# Patient Record
Sex: MALE | Race: WHITE | NOT HISPANIC OR LATINO | Employment: FULL TIME | URBAN - NONMETROPOLITAN AREA
[De-identification: names, ages, dates, MRNs, and addresses within clinical notes are randomized per-mention and may not be internally consistent; named-entity substitution may affect disease eponyms.]

---

## 2017-10-30 ENCOUNTER — TRANSCRIBE ORDERS (OUTPATIENT)
Dept: LAB | Facility: CLINIC | Age: 47
End: 2017-10-30

## 2017-10-30 ENCOUNTER — APPOINTMENT (OUTPATIENT)
Dept: LAB | Facility: CLINIC | Age: 47
End: 2017-10-30
Payer: COMMERCIAL

## 2017-10-30 DIAGNOSIS — E55.9 VITAMIN D DEFICIENCY: ICD-10-CM

## 2017-10-30 DIAGNOSIS — E03.9 HYPOTHYROIDISM, UNSPECIFIED TYPE: Primary | ICD-10-CM

## 2017-10-30 DIAGNOSIS — E03.9 HYPOTHYROIDISM, UNSPECIFIED TYPE: ICD-10-CM

## 2017-10-30 LAB
ALBUMIN SERPL BCP-MCNC: 4 G/DL (ref 3.5–5)
ALP SERPL-CCNC: 54 U/L (ref 46–116)
ALT SERPL W P-5'-P-CCNC: 74 U/L (ref 12–78)
ANION GAP SERPL CALCULATED.3IONS-SCNC: 7 MMOL/L (ref 4–13)
AST SERPL W P-5'-P-CCNC: 63 U/L (ref 5–45)
BASOPHILS # BLD AUTO: 0.07 THOUSANDS/ΜL (ref 0–0.1)
BASOPHILS NFR BLD AUTO: 1 % (ref 0–1)
BILIRUB SERPL-MCNC: 0.44 MG/DL (ref 0.2–1)
BILIRUB UR QL STRIP: NEGATIVE
BUN SERPL-MCNC: 13 MG/DL (ref 5–25)
CALCIUM SERPL-MCNC: 8.6 MG/DL (ref 8.3–10.1)
CHLORIDE SERPL-SCNC: 101 MMOL/L (ref 100–108)
CHOLEST SERPL-MCNC: 220 MG/DL (ref 50–200)
CLARITY UR: CLEAR
CO2 SERPL-SCNC: 30 MMOL/L (ref 21–32)
COLOR UR: YELLOW
CREAT SERPL-MCNC: 0.86 MG/DL (ref 0.6–1.3)
EOSINOPHIL # BLD AUTO: 0.21 THOUSAND/ΜL (ref 0–0.61)
EOSINOPHIL NFR BLD AUTO: 4 % (ref 0–6)
ERYTHROCYTE [DISTWIDTH] IN BLOOD BY AUTOMATED COUNT: 12.9 % (ref 11.6–15.1)
GFR SERPL CREATININE-BSD FRML MDRD: 105 ML/MIN/1.73SQ M
GLUCOSE P FAST SERPL-MCNC: 158 MG/DL (ref 65–99)
GLUCOSE UR STRIP-MCNC: NEGATIVE MG/DL
HCT VFR BLD AUTO: 42.4 % (ref 36.5–49.3)
HDLC SERPL-MCNC: 89 MG/DL (ref 40–60)
HGB BLD-MCNC: 14.4 G/DL (ref 12–17)
HGB UR QL STRIP.AUTO: NEGATIVE
KETONES UR STRIP-MCNC: NEGATIVE MG/DL
LDLC SERPL CALC-MCNC: 116 MG/DL (ref 0–100)
LEUKOCYTE ESTERASE UR QL STRIP: NEGATIVE
LYMPHOCYTES # BLD AUTO: 2 THOUSANDS/ΜL (ref 0.6–4.47)
LYMPHOCYTES NFR BLD AUTO: 33 % (ref 14–44)
MCH RBC QN AUTO: 30.9 PG (ref 26.8–34.3)
MCHC RBC AUTO-ENTMCNC: 34 G/DL (ref 31.4–37.4)
MCV RBC AUTO: 91 FL (ref 82–98)
MONOCYTES # BLD AUTO: 0.58 THOUSAND/ΜL (ref 0.17–1.22)
MONOCYTES NFR BLD AUTO: 10 % (ref 4–12)
NEUTROPHILS # BLD AUTO: 3.15 THOUSANDS/ΜL (ref 1.85–7.62)
NEUTS SEG NFR BLD AUTO: 52 % (ref 43–75)
NITRITE UR QL STRIP: NEGATIVE
NRBC BLD AUTO-RTO: 0 /100 WBCS
PH UR STRIP.AUTO: 6 [PH] (ref 4.5–8)
PLATELET # BLD AUTO: 342 THOUSANDS/UL (ref 149–390)
PMV BLD AUTO: 10 FL (ref 8.9–12.7)
POTASSIUM SERPL-SCNC: 3.9 MMOL/L (ref 3.5–5.3)
PROT SERPL-MCNC: 7 G/DL (ref 6.4–8.2)
PROT UR STRIP-MCNC: NEGATIVE MG/DL
RBC # BLD AUTO: 4.66 MILLION/UL (ref 3.88–5.62)
SODIUM SERPL-SCNC: 138 MMOL/L (ref 136–145)
SP GR UR STRIP.AUTO: 1.02 (ref 1–1.03)
T4 FREE SERPL-MCNC: 0.96 NG/DL (ref 0.76–1.46)
TRIGL SERPL-MCNC: 76 MG/DL
TSH SERPL DL<=0.05 MIU/L-ACNC: 2.91 UIU/ML (ref 0.36–3.74)
UROBILINOGEN UR QL STRIP.AUTO: 0.2 E.U./DL
WBC # BLD AUTO: 6.02 THOUSAND/UL (ref 4.31–10.16)

## 2017-10-30 PROCEDURE — 84443 ASSAY THYROID STIM HORMONE: CPT

## 2017-10-30 PROCEDURE — 85025 COMPLETE CBC W/AUTO DIFF WBC: CPT

## 2017-10-30 PROCEDURE — 36415 COLL VENOUS BLD VENIPUNCTURE: CPT

## 2017-10-30 PROCEDURE — 84439 ASSAY OF FREE THYROXINE: CPT

## 2017-10-30 PROCEDURE — 81003 URINALYSIS AUTO W/O SCOPE: CPT | Performed by: INTERNAL MEDICINE

## 2017-10-30 PROCEDURE — 80053 COMPREHEN METABOLIC PANEL: CPT

## 2017-10-30 PROCEDURE — 80061 LIPID PANEL: CPT

## 2018-05-11 ENCOUNTER — HOSPITAL ENCOUNTER (EMERGENCY)
Facility: HOSPITAL | Age: 48
Discharge: HOME | End: 2018-05-11
Attending: EMERGENCY MEDICINE | Admitting: EMERGENCY MEDICINE
Payer: COMMERCIAL

## 2018-05-11 VITALS
DIASTOLIC BLOOD PRESSURE: 60 MMHG | SYSTOLIC BLOOD PRESSURE: 135 MMHG | WEIGHT: 240 LBS | BODY MASS INDEX: 34.36 KG/M2 | RESPIRATION RATE: 18 BRPM | HEIGHT: 70 IN | TEMPERATURE: 97.6 F | OXYGEN SATURATION: 94 % | HEART RATE: 78 BPM

## 2018-05-11 DIAGNOSIS — T23.102A: Primary | ICD-10-CM

## 2018-05-11 PROCEDURE — 99283 EMERGENCY DEPT VISIT LOW MDM: CPT

## 2018-05-11 PROCEDURE — 90715 TDAP VACCINE 7 YRS/> IM: CPT | Performed by: EMERGENCY MEDICINE

## 2018-05-11 PROCEDURE — 90471 IMMUNIZATION ADMIN: CPT

## 2018-05-11 RX ORDER — NAPROXEN 500 MG/1
500 TABLET ORAL 2 TIMES DAILY WITH MEALS
Qty: 30 TABLET | Refills: 0 | Status: SHIPPED | OUTPATIENT
Start: 2018-05-11 | End: 2019-08-08 | Stop reason: ALTCHOICE

## 2018-05-11 RX ORDER — MONTELUKAST SODIUM 10 MG/1
10 TABLET ORAL
COMMUNITY
Start: 2015-10-27 | End: 2018-08-25

## 2018-05-11 RX ORDER — VALSARTAN 80 MG/1
80 TABLET ORAL DAILY
COMMUNITY
End: 2018-08-25

## 2018-05-11 RX ORDER — NAPROXEN 250 MG/1
500 TABLET ORAL ONCE
Status: COMPLETED | OUTPATIENT
Start: 2018-05-11 | End: 2018-05-11

## 2018-05-11 RX ORDER — CITALOPRAM 20 MG/1
20 TABLET ORAL DAILY
COMMUNITY
Start: 2010-12-13

## 2018-05-11 RX ORDER — LEVOTHYROXINE SODIUM 88 UG/1
88 CAPSULE ORAL
COMMUNITY
Start: 2015-12-02

## 2018-05-11 RX ADMIN — TETANUS TOXOID, REDUCED DIPHTHERIA TOXOID AND ACELLULAR PERTUSSIS VACCINE, ADSORBED 0.5 ML: 5; 2.5; 8; 8; 2.5 SUSPENSION INTRAMUSCULAR at 21:34

## 2018-05-11 RX ADMIN — NAPROXEN 500 MG: 250 TABLET ORAL at 21:33

## 2018-05-12 NOTE — DISCHARGE INSTRUCTIONS
Superficial Burn   WHAT YOU NEED TO KNOW:   A superficial burn, or first-degree burn, is when the outer layer of skin has been burned  DISCHARGE INSTRUCTIONS:   Call 911 for any of the following:   · You have trouble breathing  Return to the emergency department if:   · You have a burn to the face, neck, hands, or genitals  · Your burn covers a large area such as your trunk or entire arm or leg  · You have increased redness, numbness, or swelling in the superficial burn area  · You have red streaks or blisters spreading outward from the superficial burn  · Your pain is not relieved, or is getting worse even after you take medicine  Contact your healthcare provider if:   · You have a fever  · You have questions or concerns about your condition or care  Medicines:   · Ibuprofen or acetaminophen  are given to decrease your pain and swelling  They are available without a doctor's order  These medicines can cause liver or kidney problems if they are not used correctly  Ask how much medicine is safe to take, and how often to take it  · Take your medicine as directed  Contact your healthcare provider if you think your medicine is not helping or if you have side effects  Tell him of her if you are allergic to any medicine  Keep a list of the medicines, vitamins, and herbs you take  Include the amounts, and when and why you take them  Bring the list or the pill bottles to follow-up visits  Carry your medicine list with you in case of an emergency  Follow up with your healthcare provider as directed:  Write down your questions so you remember to ask them during your visits  First aid for a superficial burn:  A superficial burn usually heals in 3 to 5 days without scarring or blisters  Use the following first-aid guide to treat your burn:  · Remove clothing and jewelry immediately  · Flush liquid chemicals from your skin completely with large amounts of cool running water   Do not splash the chemicals into your eyes  · Brush dry chemicals off your skin if large amounts of water are not available  Small amounts of water will activate some chemicals, such as lime, and cause more damage  Do not splash the chemicals into your eyes  · Run cool or cold temperature water over the burned area for 10 minutes  A cold or cool compress can also be put on the burn  Do not use ice or ice water on the affected area  This may cause more damage to the skin  · Use an antibacterial ointment or skin cream, such as aloe vera cream, to soothe the skin  Do not put butter, petroleum jelly, or other home remedies on skin burned by a chemical     · Do not put a bandage on the burn until you are told to do so by your healthcare provider  Prevent superficial burns:   · Do not leave cups, mugs, or bowls containing hot liquids at the edge of a table  Keep pot handles turned away from the stove front  · Do not leave a lit cigarette  Discard it properly  Keep cigarette lighters and matches in a safe place where children cannot reach them  · Keep your water heater setting to low or medium (90°F to 120°F, or 32°C to 48°C)  · Wear sunscreen that has a sun protectant factor (SPF) of 15 or higher  The sunscreen should also have ultraviolet A (UVA) and ultraviolet B (UVB) protection  Follow the directions on the label when you use sunscreen  Put on more sunscreen if you are in the sun for more than an hour  Reapply sunscreen often if you go swimming or are sweating  © 2017 [facility] [facility] Information is for End User's use only and may not be sold, redistributed or otherwise used for commercial purposes  All illustrations and images included in CareNotes® are the copyrighted property of A D A M , Inc  or [employer] López  The above information is an  only  It is not intended as medical advice for individual conditions or treatments   Talk to your doctor, nurse or pharmacist before following any medical regimen to see if it is safe and effective for you  Diphtheria/Acellular Pertussis/Tetanus Booster Vaccine (Tdap) (By injection)   Pertussis Vaccine, Acellular (per-TUS-iss VAX-een, p-GKTY-imf-lar), Reduced Diphtheria Toxoid (ree-DOOST dif-THEER-ee-a TOX-oyd), Tetanus Toxoid (TET-a-nus TOX-oyd)  Protects against infections caused by tetanus (lockjaw), diphtheria, or pertussis (whooping cough)  This is a booster vaccine  Brand Name(s): Adacel, Boostrix   There may be other brand names for this medicine  When This Medicine Should Not Be Used: You should not receive this vaccine if you have had an allergic reaction to the separate or combined tetanus, diphtheria, or pertussis vaccine  You should not receive this vaccine if you have had seizures, mental changes, or any other serious reaction within 7 days after you received a pertussis vaccine  How to Use This Medicine:   Injectable  · A nurse or other health provider will give you this medicine  · Your doctor will prescribe your exact dose and tell you how often it should be given  This medicine is given as a shot into one of your muscles  · You may receive other vaccines at the same time as this one, but in a different body area  You should receive patient instructions for all of the vaccines  Talk to your doctor or nurse if you have questions  · Read and follow the patient instructions that come with this medicine  Talk to your doctor or pharmacist if you have any questions  Drugs and Foods to Avoid:   Ask your doctor or pharmacist before using any other medicine, including over-the-counter medicines, vitamins, and herbal products  · Make sure the doctor knows if you are receiving a treatment or medicine that weakens your immune system  This includes radiation treatment, steroid medicines (such as dexamethasone, hydrocortisone, methylprednisolone, prednisolone, prednisone, Medrol®), or cancer medicines    Warnings While Using This Medicine:   · Make sure your doctor knows if you are pregnant or breastfeeding or have epilepsy, a weak immune system, or a history of a stroke  Tell your doctor if you are sick or have a fever  · Tell your doctor about any reaction you had after you received a vaccine  This includes fainting, seizures, a fever over 105 degrees F, or severe redness or swelling where the shot was given  Tell your doctor if you have a history of Guillain-Barré syndrome after you received a vaccine with tetanus  · Call your doctor right away if you faint or have vision changes, numbness or tingling in your arms, hands, or feet, or a seizure after you receive this vaccine  · Tell your doctor if you have an allergy to latex  The syringes may contain dry natural latex rubber  · This vaccine will not treat an active infection  If you have a diphtheria, tetanus, or pertussis infection, you will need medicine to treat the infection  Possible Side Effects While Using This Medicine:   Call your doctor right away if you notice any of these side effects:  · Allergic reaction: Itching or hives, swelling in your face or hands, swelling or tingling in your mouth or throat, chest tightness, trouble breathing  · Changes in vision  · Fever over 105 degrees F  · Lightheadedness or fainting  · Numbness, tingling, or burning pain in your hands, arms, legs, or feet  · Seizures  · Sudden numbness or weakness in your arms or legs  · Severe pain, redness, or swelling where the shot was given  If you notice these less serious side effects, talk with your doctor:   · Headache  · Mild pain, redness, or swelling where the shot was given  · Nausea, vomiting, diarrhea, or stomach pain  · Tiredness  If you notice other side effects that you think are caused by this medicine, tell your doctor  Call your doctor for medical advice about side effects   You may report side effects to FDA at 0-856-JSW-7682  © 2017 [employer] [address] is for End User's use only and may not be sold, redistributed or otherwise used for commercial purposes  The above information is an  only  It is not intended as medical advice for individual conditions or treatments  Talk to your doctor, nurse or pharmacist before following any medical regimen to see if it is safe and effective for you

## 2018-05-12 NOTE — ED PROVIDER NOTES
History  Chief Complaint   Patient presents with    Burn     c/o burn to L little finger, per pt "I was cooking and burned it on cooking oil  I put water on it but it still is burning"  Skin intact +capillary refill +pulses  Denies fall/trauma/oil burn to any other part of body  This is a very pleasant 57-year-old right-handed male who presents with a hot oil burn injury to left hand occurring approximately 1930 this evening when he was at home  States that he was holding a glass container into which he was pouring used hot cooking oil when the container ruptured, causing a burn primarily to the distal ulnar aspect of his left palm and left fifth digit  States that there was no splashing of oil onto his face or any other parts of his body  He ran hand under cold water for several minutes and came to the emergency department  He has had continued pain in the aforementioned area since that time; denies paresthesias/weakness/paralysis of left upper extremity  Has not had any areas of open wounds/drainage/blistering that he can see  He does not have any history of fracture/surgery to left upper extremity  Does not recall date of last tetanus vaccine  I discussed results of the diagnostic workup with the patient  Reviewed relevant findings and likely diagnosis:  Superficial thermal burn of the left hand without any associated neurovascular impairment or suggestion of deeper structure injury  Reviewed treatment plan:  Recommended patient to continue use of ice/cold water soaks as needed as well as acetaminophen/ibuprofen on p r n  basis for pain control  Will update tetanus vaccine status  There is no indication for topical antibiotics as no true skin breakdown is present  Reviewed follow-up plan: Will require PMD re-evaluation in several days to assess any concerning changes in skin    Reviewed ED return precautions: return to ED for intractable pain/skin breakdown/paralysis/weakness/significant paresthesias of the left upper extremity  All questions answered prior to discharge  Patient expressed understanding and agreed to plan  History provided by:  Patient  Burn       Prior to Admission Medications   Prescriptions Last Dose Informant Patient Reported? Taking?   citalopram (CeleXA) 20 mg tablet   Yes Yes   Si mg   levothyroxine 75 mcg tablet   Yes Yes   montelukast (SINGULAIR) 10 mg tablet   Yes Yes   valsartan (DIOVAN) 80 mg tablet  Self Yes Yes   Sig: Take 80 mg by mouth daily      Facility-Administered Medications: None       Past Medical History:   Diagnosis Date    Disease of thyroid gland     Hypertension        Past Surgical History:   Procedure Laterality Date    CHOLECYSTECTOMY      FOOT SURGERY Left     KNEE ARTHROPLASTY Right     NASAL SEPTUM SURGERY      ROTATOR CUFF REPAIR Bilateral        History reviewed  No pertinent family history  I have reviewed and agree with the history as documented  Social History   Substance Use Topics    Smoking status: Never Smoker    Smokeless tobacco: Never Used    Alcohol use No      Comment: socially        Review of Systems   Constitutional: Negative for chills, diaphoresis, fatigue and fever  Musculoskeletal: Positive for arthralgias  Negative for joint swelling and myalgias  Skin: Positive for color change  Negative for pallor, rash and wound  Neurological: Negative for weakness and numbness  Hematological: Negative for adenopathy  Does not bruise/bleed easily         Physical Exam  ED Triage Vitals [18]   Temperature Pulse Respirations Blood Pressure SpO2   97 6 °F (36 4 °C) 78 18 135/60 94 %      Temp Source Heart Rate Source Patient Position - Orthostatic VS BP Location FiO2 (%)   Temporal Monitor Sitting Left arm --      Pain Score       8           Orthostatic Vital Signs  Vitals:    18   BP: 135/60   Pulse: 78   Patient Position - Orthostatic VS: Sitting       Physical Exam   Constitutional: He is oriented to person, place, and time  Vital signs are normal  He appears well-developed and well-nourished  He is active and cooperative  No distress  HENT:   Head: Normocephalic and atraumatic  Neck: Trachea normal and phonation normal    Cardiovascular: Normal rate, regular rhythm, intact distal pulses and normal pulses  Pulses:       Radial pulses are 2+ on the right side, and 2+ on the left side  Ulnar pulses 2+ bilaterally   Pulmonary/Chest: Effort normal  No respiratory distress  Musculoskeletal:        Right wrist: Normal         Left wrist: Normal         Right forearm: Normal         Left forearm: Normal         Right hand: Normal         Left hand: He exhibits tenderness and swelling  He exhibits normal range of motion, no bony tenderness, normal two-point discrimination, normal capillary refill, no deformity and no laceration  Normal sensation noted  Normal strength noted  Hands:  Neurological: He is alert and oriented to person, place, and time  He has normal strength  No sensory deficit  GCS eye subscore is 4  GCS verbal subscore is 5  GCS motor subscore is 6  Sensation is intact to light touch in the distal aspect of all digits of the bilateral upper extremity  Strength 5/5 in bilateral upper extremity at wrist/MCP/PIP/DIP in all planes of motion  Skin: Skin is warm, dry and intact  Capillary refill takes less than 2 seconds  He is not diaphoretic  There is erythema (Left hand fifth digit and distal palm as noted in MSK section)  Nursing note and vitals reviewed        ED Medications  Medications   tetanus-diphtheria-acellular pertussis (BOOSTRIX) IM injection 0 5 mL (0 5 mL Intramuscular Given 5/11/18 2134)   naproxen (NAPROSYN) tablet 500 mg (500 mg Oral Given 5/11/18 2133)       Diagnostic Studies  Results Reviewed     None                 No orders to display              Procedures  Procedures       Phone Contacts  ED Phone Contact    ED Course         MDM  Number of Diagnoses or Management Options  Superficial burn of left hand: new and requires workup     Amount and/or Complexity of Data Reviewed  Decide to obtain previous medical records or to obtain history from someone other than the patient: yes  Review and summarize past medical records: yes    Risk of Complications, Morbidity, and/or Mortality  Presenting problems: moderate  Diagnostic procedures: minimal  Management options: low    Patient Progress  Patient progress: stable    CritCare Time    Disposition  Final diagnoses:   Superficial burn of left hand     Time reflects when diagnosis was documented in both MDM as applicable and the Disposition within this note     Time User Action Codes Description Comment    5/11/2018  9:24 PM Ivon Masterson [[id number]] Superficial burn of left hand       ED Disposition     ED Disposition Condition Comment    Discharge  Dali Carlton discharge to home/self care  Condition at discharge: Stable        Follow-up Information     Follow up With Specialties Details Why Contact Info    Chicho Love MD Nephrology Schedule an appointment as soon as possible for a visit in 5 days For reexamination of your hand [address] [address]  [phone]          Discharge Medication List as of 5/11/2018  9:27 PM      START taking these medications    Details   naproxen (NAPROSYN) 500 mg tablet Take 1 tablet (500 mg total) by mouth 2 (two) times a day with meals, Starting Fri 5/11/2018, Normal         CONTINUE these medications which have NOT CHANGED    Details   citalopram (CeleXA) 20 mg tablet 20 mg, Starting Mon 12/13/2010, Historical Med      levothyroxine 75 mcg tablet Starting Wed 12/2/2015, Historical Med      montelukast (SINGULAIR) 10 mg tablet Starting Tue 10/27/2015, Historical Med      valsartan (DIOVAN) 80 mg tablet Take 80 mg by mouth daily, Historical Med           No discharge procedures on file      ED Provider  Electronically Signed by Francine Pleitez DO  05/12/18 1001

## 2018-07-09 ENCOUNTER — OFFICE VISIT (OUTPATIENT)
Dept: PHYSICAL THERAPY | Facility: MEDICAL CENTER | Age: 48
End: 2018-07-09
Payer: COMMERCIAL

## 2018-07-09 DIAGNOSIS — M77.12 LATERAL EPICONDYLITIS OF LEFT ELBOW: Primary | ICD-10-CM

## 2018-07-09 PROCEDURE — 97035 APP MDLTY 1+ULTRASOUND EA 15: CPT

## 2018-07-09 PROCEDURE — 97010 HOT OR COLD PACKS THERAPY: CPT

## 2018-07-09 PROCEDURE — 97140 MANUAL THERAPY 1/> REGIONS: CPT

## 2018-07-12 ENCOUNTER — OFFICE VISIT (OUTPATIENT)
Dept: PHYSICAL THERAPY | Facility: MEDICAL CENTER | Age: 48
End: 2018-07-12
Payer: COMMERCIAL

## 2018-07-12 DIAGNOSIS — M77.12 LATERAL EPICONDYLITIS OF LEFT ELBOW: Primary | ICD-10-CM

## 2018-07-12 PROCEDURE — 97035 APP MDLTY 1+ULTRASOUND EA 15: CPT

## 2018-07-12 PROCEDURE — 97010 HOT OR COLD PACKS THERAPY: CPT

## 2018-07-12 PROCEDURE — 97140 MANUAL THERAPY 1/> REGIONS: CPT

## 2018-07-12 NOTE — PROGRESS NOTES
Daily Note     Today's date: 2018  Patient name: Shahzad Martell  : 1970  MRN: [id number]  Referring provider: Eduardo Iverson MD  Dx:   Encounter Diagnosis     ICD-10-CM    1  Lateral epicondylitis of left elbow M77 12                   Subjective: Patient reports L elbow is feeling a little better  Wearing tubigrip for edema control  Objective: See treatment diary below      Assessment: Tolerated treatment well  Patient would benefit from continued PT to decrease pain and edema L elbow  Plan: Continue per plan of care          Precautions:na    Daily Treatment Diary     L lateral epicondyle  Manual              Retrograde massage  x13' x13'                                                                        Modalities  7/10 7/9            Pulsed US @1 2  w/cm2 x10' x10'            CP  x10 x10

## 2018-07-16 ENCOUNTER — TRANSCRIBE ORDERS (OUTPATIENT)
Dept: PHYSICAL THERAPY | Facility: MEDICAL CENTER | Age: 48
End: 2018-07-16

## 2018-07-16 ENCOUNTER — EVALUATION (OUTPATIENT)
Dept: PHYSICAL THERAPY | Facility: MEDICAL CENTER | Age: 48
End: 2018-07-16
Payer: COMMERCIAL

## 2018-07-16 DIAGNOSIS — M77.12 LATERAL EPICONDYLITIS OF LEFT ELBOW: Primary | ICD-10-CM

## 2018-07-16 PROCEDURE — 97140 MANUAL THERAPY 1/> REGIONS: CPT

## 2018-07-16 PROCEDURE — G8985 CARRY GOAL STATUS: HCPCS

## 2018-07-16 PROCEDURE — G8984 CARRY CURRENT STATUS: HCPCS

## 2018-07-16 PROCEDURE — 97035 APP MDLTY 1+ULTRASOUND EA 15: CPT

## 2018-07-16 PROCEDURE — 97010 HOT OR COLD PACKS THERAPY: CPT

## 2018-07-16 NOTE — LETTER
2018    MD Spencer Bhatiamartinez [address] [facility]    Patient: Ardie Nageotte   YOB: 1970   Date of Visit: 2018     Encounter Diagnosis     ICD-10-CM    1  Lateral epicondylitis of left elbow M77 12        Dear Dr Karissa Elaine:    Please review the attached Plan of Care from [facility] recent visit  Please verify that you agree therapy should continue by signing the attached document and sending it back to our office  If you have any questions or concerns, please don't hesitate to call  Sincerely,    Lin Bonds, PT      Referring Provider:      I certify that I have read the below Plan of Care and certify the need for these services furnished under this plan of treatment while under my care  Yudi Hernandez MD  [facility]: [phone]          PT Re-Evaluation     Today's date: 2018  Patient name: Ardie Nageotte  : 1970  MRN: [id number]  Referring provider: Rabia Moreno MD  Dx:   Encounter Diagnosis     ICD-10-CM    1   Lateral epicondylitis of left elbow M77 12                   Assessment  Impairments: abnormal or restricted ROM, activity intolerance and impaired physical strength  Understanding of Dx/Px/POC: excellent   Prognosis: good    Goals  Short term goals:  2-4 weeks  Patient will report pain level of 2/10 with functional activities  Patient will gain ROM of wrist flexion to full passive flexion without pain  Patient will increase strength to allow him to lift, carry and grasp without pain  Patient will increase functional score as measured by FOTO by 10%      Plan  Planned modality interventions: ultrasound  Planned therapy interventions: strengthening, stretching, therapeutic activities, patient education and manual therapy  Frequency: 2x week  Duration in weeks: 6     Patient is a 41year old male referred to physical therapy with diagnosis lateral epicondylitis  He has had previous issues with his right arm in the past   He was seen for 6 out patient therapy visits since his initial evaluation on 5/25/2018  He is working on a home exercise program for stretching  His functional score as measured by DASH has improved form 43 to 38% disability  He experiences relief with use of compression    Subjective  Pain level down today to 2/10  States initially the pain had been as high as 7/10  Objective     Observations     Left Elbow   Postive for edema  Additional Observation Details  Here has been a significant decrease in the amount of edema present with the use of compression  Palpation   Left   Tenderness of the wrist extensors  Trigger point to wrist extensors  Tenderness     Left Elbow   Tenderness in the lateral epicondyle  Left Wrist/Hand   Tenderness in the lateral epicondyle  Neurological Testing     Sensation     Elbow   Left Elbow   Inact: light touch    Active Range of Motion     Additional Active Range of Motion Details  Patient presents with pain at end range of passive wrist flexion    Elbow ROM [facility]    Strength/Myotome Testing     Left Wrist/Hand   Wrist extension: 3+ (painful with resistence)          Precautions: none    Daily Treatment Diary     L lateral epicondyle  Manual  7/16/2018 7/12 7/9            Retrograde massage  10 x13' x13'            Gentle stretching into wrist flexion 3                                                               Modalities  7/16/2018 7/10 7/9            Pulsed US @1 2  w/cm2 x10 x10' x10'            CP  x10 x10 x10

## 2018-07-17 ENCOUNTER — TRANSCRIBE ORDERS (OUTPATIENT)
Dept: LAB | Facility: CLINIC | Age: 48
End: 2018-07-17

## 2018-07-17 ENCOUNTER — APPOINTMENT (OUTPATIENT)
Dept: LAB | Facility: CLINIC | Age: 48
End: 2018-07-17
Payer: COMMERCIAL

## 2018-07-17 DIAGNOSIS — I15.9 SECONDARY HYPERTENSION: ICD-10-CM

## 2018-07-17 DIAGNOSIS — E05.90 HYPERTHYROIDISM: ICD-10-CM

## 2018-07-17 DIAGNOSIS — I15.9 SECONDARY HYPERTENSION: Primary | ICD-10-CM

## 2018-07-17 LAB
ALBUMIN SERPL BCP-MCNC: 4.2 G/DL (ref 3.5–5)
ALP SERPL-CCNC: 51 U/L (ref 46–116)
ALT SERPL W P-5'-P-CCNC: 40 U/L (ref 12–78)
ANION GAP SERPL CALCULATED.3IONS-SCNC: 4 MMOL/L (ref 4–13)
AST SERPL W P-5'-P-CCNC: 30 U/L (ref 5–45)
BASOPHILS # BLD AUTO: 0.09 THOUSANDS/ΜL (ref 0–0.1)
BASOPHILS NFR BLD AUTO: 1 % (ref 0–1)
BILIRUB SERPL-MCNC: 0.51 MG/DL (ref 0.2–1)
BILIRUB UR QL STRIP: NEGATIVE
BUN SERPL-MCNC: 15 MG/DL (ref 5–25)
CALCIUM SERPL-MCNC: 8.7 MG/DL (ref 8.3–10.1)
CHLORIDE SERPL-SCNC: 101 MMOL/L (ref 100–108)
CLARITY UR: CLEAR
CO2 SERPL-SCNC: 30 MMOL/L (ref 21–32)
COLOR UR: YELLOW
CREAT SERPL-MCNC: 0.85 MG/DL (ref 0.6–1.3)
EOSINOPHIL # BLD AUTO: 0.13 THOUSAND/ΜL (ref 0–0.61)
EOSINOPHIL NFR BLD AUTO: 2 % (ref 0–6)
ERYTHROCYTE [DISTWIDTH] IN BLOOD BY AUTOMATED COUNT: 13.2 % (ref 11.6–15.1)
GFR SERPL CREATININE-BSD FRML MDRD: 105 ML/MIN/1.73SQ M
GLUCOSE P FAST SERPL-MCNC: 96 MG/DL (ref 65–99)
GLUCOSE UR STRIP-MCNC: NEGATIVE MG/DL
HCT VFR BLD AUTO: 42.6 % (ref 36.5–49.3)
HGB BLD-MCNC: 14 G/DL (ref 12–17)
HGB UR QL STRIP.AUTO: NEGATIVE
IMM GRANULOCYTES # BLD AUTO: 0.02 THOUSAND/UL (ref 0–0.2)
IMM GRANULOCYTES NFR BLD AUTO: 0 % (ref 0–2)
KETONES UR STRIP-MCNC: NEGATIVE MG/DL
LEUKOCYTE ESTERASE UR QL STRIP: NEGATIVE
LYMPHOCYTES # BLD AUTO: 2.28 THOUSANDS/ΜL (ref 0.6–4.47)
LYMPHOCYTES NFR BLD AUTO: 29 % (ref 14–44)
MCH RBC QN AUTO: 30.4 PG (ref 26.8–34.3)
MCHC RBC AUTO-ENTMCNC: 32.9 G/DL (ref 31.4–37.4)
MCV RBC AUTO: 92 FL (ref 82–98)
MONOCYTES # BLD AUTO: 0.85 THOUSAND/ΜL (ref 0.17–1.22)
MONOCYTES NFR BLD AUTO: 11 % (ref 4–12)
NEUTROPHILS # BLD AUTO: 4.61 THOUSANDS/ΜL (ref 1.85–7.62)
NEUTS SEG NFR BLD AUTO: 57 % (ref 43–75)
NITRITE UR QL STRIP: NEGATIVE
NRBC BLD AUTO-RTO: 0 /100 WBCS
PH UR STRIP.AUTO: 6 [PH] (ref 4.5–8)
PLATELET # BLD AUTO: 373 THOUSANDS/UL (ref 149–390)
PMV BLD AUTO: 10 FL (ref 8.9–12.7)
POTASSIUM SERPL-SCNC: 3.8 MMOL/L (ref 3.5–5.3)
PROT SERPL-MCNC: 7.4 G/DL (ref 6.4–8.2)
PROT UR STRIP-MCNC: NEGATIVE MG/DL
RBC # BLD AUTO: 4.61 MILLION/UL (ref 3.88–5.62)
SODIUM SERPL-SCNC: 135 MMOL/L (ref 136–145)
SP GR UR STRIP.AUTO: 1.01 (ref 1–1.03)
T4 FREE SERPL-MCNC: 0.96 NG/DL (ref 0.76–1.46)
TSH SERPL DL<=0.05 MIU/L-ACNC: 3.47 UIU/ML (ref 0.36–3.74)
UROBILINOGEN UR QL STRIP.AUTO: 0.2 E.U./DL
WBC # BLD AUTO: 7.98 THOUSAND/UL (ref 4.31–10.16)

## 2018-07-17 PROCEDURE — 81003 URINALYSIS AUTO W/O SCOPE: CPT

## 2018-07-17 PROCEDURE — 84439 ASSAY OF FREE THYROXINE: CPT

## 2018-07-17 PROCEDURE — 84443 ASSAY THYROID STIM HORMONE: CPT

## 2018-07-17 PROCEDURE — 36415 COLL VENOUS BLD VENIPUNCTURE: CPT

## 2018-07-17 PROCEDURE — 80053 COMPREHEN METABOLIC PANEL: CPT

## 2018-07-17 PROCEDURE — 85025 COMPLETE CBC W/AUTO DIFF WBC: CPT

## 2018-08-25 ENCOUNTER — HOSPITAL ENCOUNTER (EMERGENCY)
Facility: HOSPITAL | Age: 48
Discharge: HOME | End: 2018-08-25
Payer: COMMERCIAL

## 2018-08-25 VITALS
RESPIRATION RATE: 20 BRPM | OXYGEN SATURATION: 100 % | HEART RATE: 78 BPM | BODY MASS INDEX: 34.48 KG/M2 | DIASTOLIC BLOOD PRESSURE: 76 MMHG | SYSTOLIC BLOOD PRESSURE: 134 MMHG | TEMPERATURE: 97.5 F | WEIGHT: 240.3 LBS

## 2018-08-25 DIAGNOSIS — S39.012A LUMBAR STRAIN, INITIAL ENCOUNTER: Primary | ICD-10-CM

## 2018-08-25 PROCEDURE — 96372 THER/PROPH/DIAG INJ SC/IM: CPT

## 2018-08-25 PROCEDURE — 99283 EMERGENCY DEPT VISIT LOW MDM: CPT

## 2018-08-25 RX ORDER — VALSARTAN AND HYDROCHLOROTHIAZIDE 80; 12.5 MG/1; MG/1
1 TABLET, FILM COATED ORAL DAILY
COMMUNITY

## 2018-08-25 RX ORDER — KETOROLAC TROMETHAMINE 30 MG/ML
60 INJECTION, SOLUTION INTRAMUSCULAR; INTRAVENOUS ONCE
Status: COMPLETED | OUTPATIENT
Start: 2018-08-25 | End: 2018-08-25

## 2018-08-25 RX ORDER — CYCLOBENZAPRINE HCL 10 MG
10 TABLET ORAL 3 TIMES DAILY PRN
Qty: 30 TABLET | Refills: 0 | Status: SHIPPED | OUTPATIENT
Start: 2018-08-25 | End: 2019-03-18 | Stop reason: SDUPTHER

## 2018-08-25 RX ADMIN — KETOROLAC TROMETHAMINE 60 MG: 30 INJECTION, SOLUTION INTRAMUSCULAR at 10:56

## 2018-08-25 NOTE — ED PROVIDER NOTES
History  Chief Complaint   Patient presents with    Back Pain     AWOKE WITH LOW BACK PAIN THIS AM AND NOW HAVING SPASMS     Patient presents to the emergency department today via private vehicle alone offering complaints of low back pain bilaterally  He states he woke up this morning experiencing some low back pain  States initially he reported some stiffness  He has self-treated with some heat has not noted much relief  Pain is worse with any range of motion  He states pain does radiate into the bilateral hips  No paresthesias  Denies ambulatory dysfunction  Denies weakness of the lower extremities  Denies saddle anesthesia symptoms  He denies incontinence of urine or stool  He had normal bowel movement and did pass his urine this morning without difficulty  There is no history of direct traumatic event  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 Taking?   citalopram (CeleXA) 20 mg tablet   Yes Yes   Si mg daily     levothyroxine 75 mcg tablet   Yes Yes   Si mcg daily in the early morning     naproxen (NAPROSYN) 500 mg tablet   No No   Sig: Take 1 tablet (500 mg total) by mouth 2 (two) times a day with meals   valsartan-hydrochlorothiazide (DIOVAN-HCT) 80-12 5 MG per tablet   Yes Yes   Sig: Take 1 tablet by mouth daily      Facility-Administered Medications: None       Past Medical History:   Diagnosis Date    Disease of thyroid gland     Hypertension        Past Surgical History:   Procedure Laterality Date    CHOLECYSTECTOMY      FOOT SURGERY Left     KNEE ARTHROPLASTY Right     NASAL SEPTUM SURGERY      ROTATOR CUFF REPAIR Bilateral        History reviewed  No pertinent family history  I have reviewed and agree with the history as documented      Social History   Substance Use Topics    Smoking status: Never Smoker    Smokeless tobacco: Never Used    Alcohol use Yes      Comment: socially        Review of Systems   Constitutional: Negative  HENT: Negative  Eyes: Negative  Respiratory: Negative  Cardiovascular: Negative  Gastrointestinal: Negative  Endocrine: Negative  Genitourinary: Negative  Musculoskeletal: Positive for back pain  Negative for arthralgias, gait problem, joint swelling, myalgias, neck pain and neck stiffness  Skin: Negative  Allergic/Immunologic: Negative  Neurological: Negative  Hematological: Negative  Psychiatric/Behavioral: Negative  All other systems reviewed and are negative  Physical Exam  Physical Exam   Constitutional: He is oriented to person, place, and time  He appears well-developed and well-nourished  No distress  HENT:   Head: Normocephalic  Right Ear: External ear normal    Left Ear: External ear normal    Nose: Nose normal    Mouth/Throat: Oropharynx is clear and moist    Eyes: EOM are normal  Pupils are equal, round, and reactive to light  Neck: Normal range of motion  Cardiovascular: Normal rate, regular rhythm, normal heart sounds and intact distal pulses  Exam reveals no gallop and no friction rub  No murmur heard  Pulmonary/Chest: Effort normal and breath sounds normal  No respiratory distress  He has no wheezes  He has no rales  He exhibits no tenderness  Abdominal: Soft  There is no tenderness  Musculoskeletal: He exhibits no edema or deformity  No evidence of central spinal tenderness  No step-offs crepitus contusion abrasion laceration or swelling  There is evidence of paravertebral lumbar muscular tenderness  Patient exhibits normal patellar reflexes bilaterally  Neurological: He is alert and oriented to person, place, and time  Skin: Skin is warm  Capillary refill takes less than 2 seconds  He is not diaphoretic     Psychiatric: He has a normal mood and affect  Vitals reviewed  Vital Signs  ED Triage Vitals [08/25/18 1022]   Temperature Pulse Respirations Blood Pressure SpO2   97 5 °F (36 4 °C) 78 20 134/76 100 %      Temp Source Heart Rate Source Patient Position - Orthostatic VS BP Location FiO2 (%)   Temporal Left Sitting Left arm --      Pain Score       4           Vitals:    08/25/18 1022   BP: 134/76   Pulse: 78   Patient Position - Orthostatic VS: Sitting       Visual Acuity      ED Medications  Medications   ketorolac (TORADOL) injection 60 mg (not administered)       Diagnostic Studies  Results Reviewed     None                 No orders to display              Procedures  Procedures       Phone Contacts  ED Phone Contact    ED Course  ED Course as of Aug 25 1048   Sat Aug 25, 2018   1028 Blood Pressure: 134/76   1028 Temperature: 97 5 °F (36 4 °C)   1028 Pulse: 78   1028 Respirations: 20   1028 SpO2: 100 %                               MDM  CritCare Time    Disposition  Final diagnoses:   Lumbar strain, initial encounter     Time reflects when diagnosis was documented in both MDM as applicable and the Disposition within this note     Time User Action Codes Description Comment    8/25/2018 10:45 AM Sunday BREAUX Add [S39 012A] Lumbar strain, initial encounter       ED Disposition     ED Disposition Condition Comment    Discharge  Vianey Marte discharge to home/self care      Condition at discharge: Good        Follow-up Information     Follow up With Specialties Details Why Ewell Dandy, MD Nephrology, Internal Medicine Schedule an appointment as soon as possible for a visit If symptoms worsen [address] [address]  [phone]            Patient's Medications   Discharge Prescriptions    CYCLOBENZAPRINE (FLEXERIL) 10 MG TABLET    Take 1 tablet (10 mg total) by mouth 3 (three) times a day as needed for muscle spasms       Start Date: 8/25/2018 End Date: --       Order Dose: 10 mg       Quantity: 30 tablet    Refills: 0     No discharge procedures on file      ED Provider  Electronically Signed by           Roger Vargas PA-C  08/25/18 1042

## 2018-08-25 NOTE — DISCHARGE INSTRUCTIONS
Low Back Strain, Ambulatory Care   GENERAL INFORMATION:   Low back strain  is an injury to your lower back muscles or tendons  Tendons are strong tissues that connect muscles to bones  The lower back supports most of your body weight and helps you move, twist, and bend  Low back strain is usually caused by activities that increase stress on the lower back, such as exercise or injury  Common symptoms include the following:   · Low back pain or muscle spasms    · Stiffness or limited movement    · Pain that goes down to the buttocks, groin, or legs    · Pain that is worse with activity  Seek immediate care for the following symptoms:   · A pop in your lower back    · Increased swelling or pain in your lower back    · Trouble moving your legs    · Numbness in your legs  Treatment for low back strain:   · NSAIDs  help decrease swelling and pain or fever  This medicine is available with or without a doctor's order  NSAIDs can cause stomach bleeding or kidney problems in certain people  If you take blood thinner medicine, always ask your healthcare provider if NSAIDs are safe for you  Always read the medicine label and follow directions  · Muscle relaxers  help decrease muscle spasms pain  · Prescription pain medicine  may be given  Ask how to take this medicine safely  Manage your symptoms:   · Rest  in bed after your injury  Slowly start to increase your activity as the pain decreases, or as directed  · Apply ice  on your lower back for 15 to 20 minutes every hour or as directed  Use an ice pack, or put crushed ice in a plastic bag  Cover it with a towel  Ice helps prevent tissue damage and decreases swelling and pain  You can alternate ice and heat  · Apply heat  on your lower back for 20 to 30 minutes every 2 hours for as many days as directed  Heat helps decrease pain and muscle spasms  Prevent another low back strain:   · Use proper body mechanics        ¨ Bend at the hips and knees when you  objects  Do not bend from the waist  Use your leg muscles as you lift the load  Do not use your back  Keep the object close to your chest as you lift it  Try not to twist or lift anything above your waist     ¨ Change your position often when you stand for long periods of time  Rest one foot on a small box or footrest, and then switch to the other foot often  ¨ Try not to sit for long periods of time  When you do, sit in a straight-backed chair with your feet flat on the floor  Never reach, pull, or push while you are sitting  · Exercise regularly  Warm up before you exercise  Do exercises that strengthen your back muscles  Ask about the best exercise plan for you  · Maintain a healthy weight  Ask your healthcare provider how much you should weigh  Ask him to help you create a weight loss plan if you are overweight  Follow up with your healthcare provider as directed:  Write down your questions so you remember to ask them during your visits  CARE AGREEMENT:   You have the right to help plan your care  Learn about your health condition and how it may be treated  Discuss treatment options with your caregivers to decide what care you want to receive  You always have the right to refuse treatment  The above information is an  only  It is not intended as medical advice for individual conditions or treatments  Talk to your doctor, nurse or pharmacist before following any medical regimen to see if it is safe and effective for you  © 2014 [address] is for End User's use only and may not be sold, redistributed or otherwise used for commercial purposes  All illustrations and images included in CareNotes® are the copyrighted property of [employer]  or Yuan López

## 2018-10-08 ENCOUNTER — EVALUATION (OUTPATIENT)
Dept: PHYSICAL THERAPY | Facility: MEDICAL CENTER | Age: 48
End: 2018-10-08
Payer: COMMERCIAL

## 2018-10-08 DIAGNOSIS — M54.50 ACUTE BILATERAL LOW BACK PAIN WITHOUT SCIATICA: Primary | ICD-10-CM

## 2018-10-08 PROCEDURE — 97161 PT EVAL LOW COMPLEX 20 MIN: CPT

## 2018-10-08 PROCEDURE — 97110 THERAPEUTIC EXERCISES: CPT

## 2018-10-08 PROCEDURE — G8982 BODY POS GOAL STATUS: HCPCS

## 2018-10-08 PROCEDURE — G8981 BODY POS CURRENT STATUS: HCPCS

## 2018-10-08 NOTE — LETTER
2018    MD Zach Arboleda  [address] [facility] [address] [place] [address]    Patient: Branodn Harmon   YOB: 1970   Date of Visit: 10/8/2018     Encounter Diagnosis     ICD-10-CM    1  Acute bilateral low back pain without sciatica M54 5        Dear Dr Scooter Ramirez:    Please review the attached Plan of Care from Na King recent visit  Please verify that you agree therapy should continue by signing the attached document and sending it back to our office  If you have any questions or concerns, please don't hesitate to call  Sincerely,    Nella Brantley, PT      Referring Provider:      I certify that I have read the below Plan of Care and certify the need for these services furnished under this plan of treatment while under my care  MD Zach Arboleda [facility]   VIA Facsimile: [phone]          PT Evaluation     Today's date: 10/8/2018  Patient name: Brandon Harmon  : 1970  MRN: [id number]  Referring provider: Danny Farooq MD  Dx:   Encounter Diagnosis     ICD-10-CM    1  Acute bilateral low back pain without sciatica M54 5        Start Time: 0811  Stop Time: 6287  Total time in clinic (min): 46 minutes    Assessment  Impairments: activity intolerance, impaired physical strength, lacks appropriate home exercise program and pain with function    Assessment details: Pt is 57 y o  male seen for PT evaluation for diagnosis of axial pain secondary to sprain/strain, myofascial pain, SI pain  Received orders for PT eval and tx,including core strengthening, dynamic stabilization,lower extremity and spinal ROM  Comorbidities affecting pt's physical performance at time of assessment include: overweight  Prior level of function, pt was independent and activities  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   Please find objective findings from PT evaluation outlined below, with impairments and limitations including, back pain, muscle tightness, decreased reversal of lumbar curve, decreased core muscle strength  Pt's clinical presentation is currently stable  Pt to benefit from continued PT tx to address deficits as defined above and maximize level of functional independent mobility in order to facilitate return to prior level of function  Understanding of Dx/Px/POC: excellent  Goals    Short Term Goals to be achieved in 3-4 weeks  Patient will demonstrate correct body mechanics to prevent reinjury  Pain level will decrease to 2-3/10 with functional activites  Functional score as measured by Blas Cortes will improve to 70  Long Term Goals to be achieved in 4-6weeks  Patient will be independent in home exercise program  Patient pain level will decrease to 2/10  Patient ROM will increase to full lumbar flexion and hamstring ROM without pain  Core muscle strength 4/5  Patrient will increase score on FOTO to 73    Plan  Patient would benefit from: lymphedema eval  Planned modality interventions: ultrasound  Planned therapy interventions: abdominal trunk stabilization, patient education, strengthening, stretching, therapeutic exercise, flexibility and home exercise program  Frequency: 2x week  Duration in weeks: 6  Plan of Care beginning date: 10/8/2018  Plan of Care expiration date: 2018  Treatment plan discussed with: patient and PTA        Subjective Evaluation    History of Present Illness  Date of onset: 2018  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  So bad had to go to ER  Was seen by chiropractor  No relief in several visits  Went to see Dr Anamaria Lopez  Was given injection into SI joints  States feels less stiff than before    Lifting causes spasms  Not a recurrent problem   Quality of life: excellent    Pain  Current pain ratin  At best pain ratin  At worst pain ratin  Location: both SI area and into gluteal regions  Quality: dull ache  Aggravating factors: running, sitting, standing and lifting  Progression: improved    Social Support  Steps to enter house: yes  Stairs in house: yes   Lives with: spouse    Employment status: working  Hand dominance: right    Treatments  Previous treatment: injection treatment  Patient Goals  Patient goals for therapy: decreased pain, increased motion, increased strength, independence with ADLs/IADLs and return to sport/leisure activities  Patient goal: able to so things without pain        Objective     Special Questions  Negative for night pain, bladder dysfunction, bowel dysfunction, saddle (S4) numbness, history of cancer and history of trauma    Neurological Testing     Sensation     Lumbar   Left   Intact: light touch    Active Range of Motion     Lumbar   Flexion: 75 degrees   Extension: 30 degrees   Left lateral flexion: Active left lumbar lateral flexion: pulls on opposite side  WFL  Right lateral flexion: WFL  Left rotation: [facility]  Right rotation: [facility]    Additional Active Range of Motion Details  No reversal of lumbar curve    Passive Range of Motion     Additional Passive Range of Motion Details  Hamstring tightness bilateral at 45 degrees    Negative for SLR      Flowsheet Rows      Most Recent Value   PT/OT G-Codes   Current Score  65 [foto]   Projected Score  72   Assessment Type  Evaluation   G code set  Changing & Maintaining Body Position   Changing and Maintaining Body Position Current Status ()  CJ   Changing and Maintaining Body Position Goal Status ()  CJ          Precautions: none    Reassessment 11/7/2018    Daily Treatment Diary         Exercise Diary  10/8            Bridging             Lower trunk rotation             Adduction squeeze x30            Bilateral hamstring stretch long sitting together 1 min            Wall slides x10 5 sec hold            DKTC             Prone on elbows             plank             Standing extension             Angry cat             Curl ups             Pelvic tilt overhead arm extension

## 2018-10-08 NOTE — PROGRESS NOTES
PT Evaluation     Today's date: 10/8/2018  Patient name: Luna Clark  : 1970  MRN: [id number]  Referring provider: Luz Lopez MD  Dx:   Encounter Diagnosis     ICD-10-CM    1  Acute bilateral low back pain without sciatica M54 5        Start Time: 811  Stop Time: 57  Total time in clinic (min): 46 minutes    Assessment  Impairments: activity intolerance, impaired physical strength, lacks appropriate home exercise program and pain with function    Assessment details: Pt is 57 y o  male seen for PT evaluation for diagnosis of axial pain secondary to sprain/strain, myofascial pain, SI pain  Received orders for PT eval and tx,including core strengthening, dynamic stabilization,lower extremity and spinal ROM  Comorbidities affecting pt's physical performance at time of assessment include: overweight  Prior level of function, pt was independent and activities  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  Please find objective findings from PT evaluation outlined below, with impairments and limitations including, back pain, muscle tightness, decreased reversal of lumbar curve, decreased core muscle strength  Pt's clinical presentation is currently stable  Pt to benefit from continued PT tx to address deficits as defined above and maximize level of functional independent mobility in order to facilitate return to prior level of function  Understanding of Dx/Px/POC: excellent  Goals    Short Term Goals to be achieved in 3-4 weeks  Patient will demonstrate correct body mechanics to prevent reinjury  Pain level will decrease to 2-3/10 with functional activites  Functional score as measured by Benson Cranker will improve to 70  Long Term Goals to be achieved in 4-6weeks  Patient will be independent in home exercise program  Patient pain level will decrease to 2/10  Patient ROM will increase to full lumbar flexion and hamstring ROM without pain  Core muscle strength 4/5  Patrient will increase score on FOTO to 68    Plan  Patient would benefit from: lymphedema eval  Planned modality interventions: ultrasound  Planned therapy interventions: abdominal trunk stabilization, patient education, strengthening, stretching, therapeutic exercise, flexibility and home exercise program  Frequency: 2x week  Duration in weeks: 6  Plan of Care beginning date: 10/8/2018  Plan of Care expiration date: 2018  Treatment plan discussed with: patient and PTA        Subjective Evaluation    History of Present Illness  Date of onset: 2018  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  So bad had to go to ER  Was seen by chiropractor  No relief in several visits  Went to see Dr Vernon Goltz  Was given injection into SI joints  States feels less stiff than before  Lifting causes spasms  Not a recurrent problem   Quality of life: excellent    Pain  Current pain ratin  At best pain ratin  At worst pain ratin  Location: both SI area and into gluteal regions  Quality: dull ache  Aggravating factors: running, sitting, standing and lifting  Progression: improved    Social Support  Steps to enter house: yes  Stairs in house: yes   Lives with: spouse    Employment status: working  Hand dominance: right    Treatments  Previous treatment: injection treatment  Patient Goals  Patient goals for therapy: decreased pain, increased motion, increased strength, independence with ADLs/IADLs and return to sport/leisure activities  Patient goal: able to so things without pain        Objective     Special Questions  Negative for night pain, bladder dysfunction, bowel dysfunction, saddle (S4) numbness, history of cancer and history of trauma    Neurological Testing     Sensation     Lumbar   Left   Intact: light touch    Active Range of Motion     Lumbar   Flexion: 75 degrees   Extension: 30 degrees   Left lateral flexion: Active left lumbar lateral flexion: pulls on opposite side   WFL  Right lateral flexion: WFL  Left rotation: WFL  Right rotation: [facility]    Additional Active Range of Motion Details  No reversal of lumbar curve    Passive Range of Motion     Additional Passive Range of Motion Details  Hamstring tightness bilateral at 45 degrees    Negative for SLR      Flowsheet Rows      Most Recent Value   PT/OT G-Codes   Current Score  65 [foto]   Projected Score  72   Assessment Type  Evaluation   G code set  Changing & Maintaining Body Position   Changing and Maintaining Body Position Current Status ()  CJ   Changing and Maintaining Body Position Goal Status ()  CJ          Precautions: none    Reassessment 11/7/2018    Daily Treatment Diary         Exercise Diary  10/8            Bridging             Lower trunk rotation             Adduction squeeze x30            Bilateral hamstring stretch long sitting together 1 min            Wall slides x10 5 sec hold            DKTC             Prone on elbows             plank             Standing extension             Angry cat             Curl ups             Pelvic tilt overhead arm extension

## 2018-10-09 ENCOUNTER — TRANSCRIBE ORDERS (OUTPATIENT)
Dept: PHYSICAL THERAPY | Facility: MEDICAL CENTER | Age: 48
End: 2018-10-09

## 2018-10-09 DIAGNOSIS — M54.50 BILATERAL LOW BACK PAIN WITHOUT SCIATICA, UNSPECIFIED CHRONICITY: Primary | ICD-10-CM

## 2018-10-10 ENCOUNTER — APPOINTMENT (OUTPATIENT)
Dept: PHYSICAL THERAPY | Facility: MEDICAL CENTER | Age: 48
End: 2018-10-10
Payer: COMMERCIAL

## 2018-10-15 ENCOUNTER — OFFICE VISIT (OUTPATIENT)
Dept: PHYSICAL THERAPY | Facility: MEDICAL CENTER | Age: 48
End: 2018-10-15
Payer: COMMERCIAL

## 2018-10-15 DIAGNOSIS — M54.50 ACUTE BILATERAL LOW BACK PAIN WITHOUT SCIATICA: Primary | ICD-10-CM

## 2018-10-15 PROCEDURE — 97110 THERAPEUTIC EXERCISES: CPT

## 2018-10-15 NOTE — PROGRESS NOTES
Daily Note     Today's date: 10/15/2018  Patient name: Saji Long  : 1970  MRN: [id number]  Referring provider: Dolores Limon MD  Dx:   Encounter Diagnosis     ICD-10-CM    1  Acute bilateral low back pain without sciatica M54 5                   Subjective: Pt c/o tightness and spasm L>R LB this a m  Objective: See treatment diary below      Assessment: Tolerated treatment well  Patient exhibited good technique with therapeutic exercises and would benefit from continued PT      Plan: Continue per plan of care         Precautions: none    Reassessment 2018    Daily Treatment Diary         Exercise Diary  10/8 10/15           Bridging  x15           Lower trunk rotation  5x10"           Adduction squeeze x30 x30           Bilateral hamstring stretch long sitting together 1 min 1 min           Wall slides x10 5 sec hold 15 x5 sec           DKTC  5x5"           Prone on elbows  hold           plank  15"           Standing extension  hold           Angry cat  x15           Curl ups  15 x5"           Pelvic tilt overhead arm extension  2# x15

## 2018-10-19 ENCOUNTER — OFFICE VISIT (OUTPATIENT)
Dept: PHYSICAL THERAPY | Facility: MEDICAL CENTER | Age: 48
End: 2018-10-19
Payer: COMMERCIAL

## 2018-10-19 DIAGNOSIS — M54.50 ACUTE BILATERAL LOW BACK PAIN WITHOUT SCIATICA: Primary | ICD-10-CM

## 2018-10-19 PROCEDURE — G8981 BODY POS CURRENT STATUS: HCPCS

## 2018-10-19 PROCEDURE — G8982 BODY POS GOAL STATUS: HCPCS

## 2018-10-19 PROCEDURE — 97110 THERAPEUTIC EXERCISES: CPT

## 2018-10-19 NOTE — PROGRESS NOTES
Daily Note     Today's date: 10/19/2018  Patient name: Collins Camilo  : 1970  MRN: [id number]  Referring provider: Cruzito Rogel MD  Dx:   Encounter Diagnosis     ICD-10-CM    1  Acute bilateral low back pain without sciatica M54 5                   Subjective: Pt reports LBP 1/10 this a m  Primarily c/o "stiffness"  Objective: See treatment diary below      Assessment: Tolerated treatment well  Patient exhibited good technique with therapeutic exercises and would benefit from continued PT      Plan: Continue per plan of care         Precautions: none    Reassessment 2018    Daily Treatment Diary         Exercise Diary  10/8 10/15 10/19          Bridging  x15 x20          Lower trunk rotation  5x10" 5x10"          Adduction squeeze x30 x30 x30          Bilateral hamstring stretch long sitting together 1 min 1 min 1 min          Wall slides x10 5 sec hold 15 x5 sec 20 x5"          DKTC  5x5" 5x5"          Prone on elbows  hold 1'          plank  15" 30"          Standing extension  hold 5x5"          Angry cat  x15 x15          Curl ups  15 x5" 20x5"          Pelvic tilt overhead arm extension  2# x15 3# x25

## 2018-10-23 ENCOUNTER — OFFICE VISIT (OUTPATIENT)
Dept: PHYSICAL THERAPY | Facility: MEDICAL CENTER | Age: 48
End: 2018-10-23
Payer: COMMERCIAL

## 2018-10-23 DIAGNOSIS — M54.50 ACUTE BILATERAL LOW BACK PAIN WITHOUT SCIATICA: Primary | ICD-10-CM

## 2018-10-23 PROCEDURE — 97110 THERAPEUTIC EXERCISES: CPT

## 2018-10-23 NOTE — PROGRESS NOTES
Daily Note     Today's date: 10/23/2018  Patient name: Rachid Wiggins  : 1970  MRN: [id number]  Referring provider: Shane Becerril MD  Dx:   Encounter Diagnosis     ICD-10-CM    1  Acute bilateral low back pain without sciatica M54 5                   Subjective: Pain level 4/10 had football two days this weekend      Objective: See treatment diary below      Assessment: Tolerated treatment well  Patient exhibited good technique with therapeutic exercises and would benefit from continued PT      Plan: Continue per plan of care           Precautions: none    Reassessment 2018    Daily Treatment Diary         Exercise Diary  10/8 10/15 10/19 10/23/    Bridging  x15 x20 x25    Lower trunk rotation  5x10" 5x10" 5 x10 sec    Adduction squeeze x30 x30 x30 x30    Bilateral hamstring stretch long sitting together 1 min 1 min 1 min 1 min    Wall slides x10 5 sec hold 15 x5 sec 20 x5" 25x5"    DKTC  5x5" 5x5" 10 x 5 sec    Prone on elbows  hold 1' 1    plank  15" 30" x35"    Standing extension  hold 5x5" 5x5"    Angry cat  x15 x15 x20    Curl ups  15 x5" 20x5" 25 x 5 sec    Pelvic tilt overhead arm extension  2# x15 3# x25 #4 x30    Hip flex stretch    3 x 10 sec    sidelying clam shell    x10 ea

## 2018-10-26 ENCOUNTER — OFFICE VISIT (OUTPATIENT)
Dept: PHYSICAL THERAPY | Facility: MEDICAL CENTER | Age: 48
End: 2018-10-26
Payer: COMMERCIAL

## 2018-10-26 DIAGNOSIS — M54.50 ACUTE BILATERAL LOW BACK PAIN WITHOUT SCIATICA: Primary | ICD-10-CM

## 2018-10-26 PROCEDURE — 97110 THERAPEUTIC EXERCISES: CPT

## 2018-10-26 NOTE — PROGRESS NOTES
Daily Note     Today's date: 10/26/2018  Patient name: Zaira Smith  : 1970  MRN: [id number]  Referring provider: Janet Esparza MD  Dx:   Encounter Diagnosis     ICD-10-CM    1  Acute bilateral low back pain without sciatica M54 5        Start Time: 1307          Subjective: Pt reports LB is not feeling too bad today  Objective: See treatment diary below      Assessment: Tolerated treatment well  Patient exhibited good technique with therapeutic exercises and would benefit from continued PT      Plan: Continue per plan of care           Precautions: none    Reassessment 2018    Daily Treatment Diary         Exercise Diary  10/8 10/15 10/19 10/23/ 6   Bridging  x15 x20 x25 x30   Lower trunk rotation  5x10" 5x10" 5 x10 sec 5x10 sec   Adduction squeeze x30 x30 x30 x30 x30   Bilateral hamstring stretch long sitting together 1 min 1 min 1 min 1 min 1 min   Wall slides x10 5 sec hold 15 x5 sec 20 x5" 25x5" 25x5"   DKTC  5x5" 5x5" 10 x 5 sec 10 x5 sec   Prone on elbows  hold 1' 1 x1'   plank  15" 30" x35" X 40"   Standing extension  hold 5x5" 5x5" 5x5"   Angry cat/camel  x15 x15 x20 x20   Curl ups  15 x5" 20x5" 25 x 5 sec 25x 5"   Pelvic tilt overhead arm extension  2# x15 3# x25 #4 x30 5# 2x15   Hip flex stretch    3 x 10 sec ea 5x10 sec ea   sidelying clam shell    x10 ea x15 ea

## 2018-10-30 ENCOUNTER — TRANSCRIBE ORDERS (OUTPATIENT)
Dept: PHYSICAL THERAPY | Facility: MEDICAL CENTER | Age: 48
End: 2018-10-30

## 2018-10-30 ENCOUNTER — EVALUATION (OUTPATIENT)
Dept: PHYSICAL THERAPY | Facility: MEDICAL CENTER | Age: 48
End: 2018-10-30
Payer: COMMERCIAL

## 2018-10-30 DIAGNOSIS — M54.50 ACUTE BILATERAL LOW BACK PAIN WITHOUT SCIATICA: Primary | ICD-10-CM

## 2018-10-30 DIAGNOSIS — M54.50 BILATERAL LOW BACK PAIN WITHOUT SCIATICA, UNSPECIFIED CHRONICITY: Primary | ICD-10-CM

## 2018-10-30 PROCEDURE — 97110 THERAPEUTIC EXERCISES: CPT

## 2018-10-30 NOTE — PROGRESS NOTES
Re-evaluation    Today's date: 10/30/2018  Patient name: Brandon Harmon  : 1970  MRN: [id number]  Referring provider: Danny Farooq MD  Dx:   Encounter Diagnosis     ICD-10-CM    1  Acute bilateral low back pain without sciatica M54 5            Start Time: 0732  Stop Time: 0800   Assessment  Impairments: activity intolerance, impaired physical strength, lacks appropriate home exercise program and pain with function    Assessment details: Pt is 57 y o  male seen for PT for diagnosis of axial pain secondary to sprain/strain, myofascial pain, SI pain  Received orders for PT eval and tx,including core strengthening, dynamic stabilization,lower extremity and spinal ROM  Comorbidities affecting pt's physical performance at time of assessment include: overweight  Prior level of function, pt was independent and activities  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  Patient has been seen for 6 physical therapy visits  He notes significant improvement with pain level decreased to 2/10 and only stiffness in the AM   He reports only  one incident of a pain level of 6/10 in the past 2 weeks  He has not had any further spasms  ROM is now [facility] except for hamstring tightness  He is independent in his home exercise program  Please find objective findings frome physical therapy re-assessment outlined below  Pt's clinical presentation is currently stable  Patient is agreeable to discharge to home exercises program in 2-3 more visits  Understanding of Dx/Px/POC: excellent  Goals  Short Term Goals to be achieved in 3-4 weeks  Patient will demonstrate correct body mechanics to prevent reinjury  Met  Pain level will decrease to 2-3/10 with functional activities   Met   Functional score as measured by Kimberly Hoang will improve to 70 Met  Long Term Goals to be achieved in 4-6weeks  Patient will be independent in home exercise program Met  Patient pain level will decrease to 2/10 Met  Patient ROM will increase to full lumbar flexion and hamstring ROM without pain progressing  Core muscle strength 4/5 progressing  Patrient will increase score on FOTO to 73 now at 72    Plan  Planned modality interventions: ultrasound  Planned therapy interventions: abdominal trunk stabilization, patient education, strengthening, stretching, therapeutic exercise, flexibility and home exercise program  Frequency: 1-2 more visits  Duration in weeks: 1-2 weeks  Plan of Care beginning date: 10/8/2018  Plan of Care expiration date: 2018  Treatment plan discussed with: patient and PTA        Subjective Evaluation    History of Present Illness  Date of onset: 2018  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  So bad had to go to ER  Was seen by chiropractor  No relief in several visits  Went to see Dr Genesis Robles  Was given injection into SI joints  States feels less stiff than before  Lifting causes spasms  Not a recurrent problem   Quality of life: excellent    Pain  Current pain ratin  At best pain ratin now 0/10  At worst pain ratin  Location: both SI area and into gluteal regions  Quality: dull ache  Aggravating factors: running, sitting, standing and lifting  Progression: improved    Treatments  Previous treatment: injection treatment  Patient Goals  Patient goals for therapy: decreased pain, increased motion, increased strength, independence with ADLs/IADLs and return to sport/leisure activities  Patient goal: able to so things without pain        Objective       Active Range of Motion     Lumbar   Flexion: 75 degrees   Extension: 30 degrees   Left lateral flexion: WFL  Right lateral flexion: WFL  Left rotation: [facility]  Right rotation: [facility]    Additional Active Range of Motion Details  No reversal of lumbar curve    Passive Range of Motion     Additional Passive Range of Motion Details  Hamstring tightness bilateral at 45 degrees    Negative for SLR      Total time in clinic (min): 28 minutes    Subjective: Doing well, stiffness in AM      Objective: See treatment diary below      Assessment: Tolerated treatment well and Pleased with progress  Patient exhibited good technique with therapeutic exercises and Continue for 2-3 more visits then discharge to home exercise program      Plan: Continue per plan of care  Potential discharge next visit        Precautions: none    Reassessment 11/29/2018    Daily Treatment Diary         Exercise Diary  10/30       Bridging x30       Lower trunk rotation 5x 10"       Adduction squeeze x30       Bilateral hamstring stretch long sitting together 1 min       Wall slides x10 5 sec hold       DKTC 5x5sec       Prone on elbows        plank 40 sec       Standing extension 5 x 5sec       Angry cat/camel x25       Curl ups 30 x 5sec       Pelvic tilt overhead arm extension #6 x30       Hip flex stretch 3 x 10 sec       sidelying clam shell X 20

## 2018-10-30 NOTE — LETTER
2018    Gini Ellsworth MD  [facility]    Patient: Joline Phoenix   YOB: 1970   Date of Visit: 10/30/2018     Encounter Diagnosis     ICD-10-CM    1  Acute bilateral low back pain without sciatica M54 5        Dear Dr Cindy Kruger:    Please review the attached Plan of Care from Esther Andrew recent visit  Please verify that you agree therapy should continue by signing the attached document and sending it back to our office  If you have any questions or concerns, please don't hesitate to call  Sincerely,    Sheldon Castellanos, PT      Referring Provider:      I certify that I have read the below Plan of Care and certify the need for these services furnished under this plan of treatment while under my care  Gini Ellsworth MD  [address]: [phone]          Re-evaluation    Today's date: 10/30/2018  Patient name: Joline Phoenix  : 1970  MRN: [id number]  Referring provider: Antonina Luis MD  Dx:   Encounter Diagnosis     ICD-10-CM    1  Acute bilateral low back pain without sciatica M54 5        Start Time: 0732  Stop Time: 0800   Assessment  Impairments: activity intolerance, impaired physical strength, lacks appropriate home exercise program and pain with function    Assessment details: Pt is 57 y o  male seen for PT for diagnosis of axial pain secondary to sprain/strain, myofascial pain, SI pain  Received orders for PT eval and tx,including core strengthening, dynamic stabilization,lower extremity and spinal ROM  Comorbidities affecting pt's physical performance at time of assessment include: overweight  Prior level of function, pt was independent and activities  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  Patient has been seen for 6 physical therapy visits    He notes significant improvement with pain level decreased to 2/10 and only stiffness in the AM   He reports only  one incident of a pain level of 6/10 in the past 2 weeks  He has not had any further spasms  ROM is now [facility] except for hamstring tightness  He is independent in his home exercise program  Please find objective findings frome physical therapy re-assessment outlined below  Pt's clinical presentation is currently stable  Patient is agreeable to discharge to home exercises program in 2-3 more visits  Understanding of Dx/Px/POC: excellent  Goals  Short Term Goals to be achieved in 3-4 weeks  Patient will demonstrate correct body mechanics to prevent reinjury  Met  Pain level will decrease to 2-3/10 with functional activities  Met   Functional score as measured by Kamilla Height will improve to 70 Met  Long Term Goals to be achieved in 4-6weeks  Patient will be independent in home exercise program Met  Patient pain level will decrease to 2/10 Met  Patient ROM will increase to full lumbar flexion and hamstring ROM without pain progressing  Core muscle strength 4/5 progressing  Patrient will increase score on FOTO to 73 now at 72    Plan  Planned modality interventions: ultrasound  Planned therapy interventions: abdominal trunk stabilization, patient education, strengthening, stretching, therapeutic exercise, flexibility and home exercise program  Frequency: 1-2 more visits  Duration in weeks: 1-2 weeks  Plan of Care beginning date: 10/8/2018  Plan of Care expiration date: 2018  Treatment plan discussed with: patient and PTA        Subjective Evaluation    History of Present Illness  Date of onset: 2018  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  So bad had to go to ER  Was seen by chiropractor  No relief in several visits  Went to see Dr Lucas Sparks  Was given injection into SI joints  States feels less stiff than before    Lifting causes spasms  Not a recurrent problem   Quality of life: excellent    Pain  Current pain ratin  At best pain ratin now 0/10  At worst pain rating: 5  Location: both SI area and into gluteal regions  Quality: dull ache  Aggravating factors: running, sitting, standing and lifting  Progression: improved    Treatments  Previous treatment: injection treatment  Patient Goals  Patient goals for therapy: decreased pain, increased motion, increased strength, independence with ADLs/IADLs and return to sport/leisure activities  Patient goal: able to so things without pain        Objective       Active Range of Motion     Lumbar   Flexion: 75 degrees   Extension: 30 degrees   Left lateral flexion: WFL  Right lateral flexion: WFL  Left rotation: [facility]  Right rotation: [facility]    Additional Active Range of Motion Details  No reversal of lumbar curve    Passive Range of Motion     Additional Passive Range of Motion Details  Hamstring tightness bilateral at 45 degrees  Negative for SLR      Total time in clinic (min): 28 minutes    Subjective: Doing well, stiffness in AM      Objective: See treatment diary below      Assessment: Tolerated treatment well and Pleased with progress  Patient exhibited good technique with therapeutic exercises and Continue for 2-3 more visits then discharge to home exercise program      Plan: Continue per plan of care  Potential discharge next visit        Precautions: none    Reassessment 11/29/2018    Daily Treatment Diary         Exercise Diary  10/30       Bridging x30       Lower trunk rotation 5x 10"       Adduction squeeze x30       Bilateral hamstring stretch long sitting together 1 min       Wall slides x10 5 sec hold       DKTC 5x5sec       Prone on elbows        plank 40 sec       Standing extension 5 x 5sec       Angry cat/camel x25       Curl ups 30 x 5sec       Pelvic tilt overhead arm extension #6 x30       Hip flex stretch 3 x 10 sec       sidelying clam shell X 20

## 2018-11-02 ENCOUNTER — OFFICE VISIT (OUTPATIENT)
Dept: PHYSICAL THERAPY | Facility: MEDICAL CENTER | Age: 48
End: 2018-11-02
Payer: COMMERCIAL

## 2018-11-02 DIAGNOSIS — M54.50 ACUTE BILATERAL LOW BACK PAIN WITHOUT SCIATICA: Primary | ICD-10-CM

## 2018-11-02 PROCEDURE — G8981 BODY POS CURRENT STATUS: HCPCS

## 2018-11-02 PROCEDURE — G8982 BODY POS GOAL STATUS: HCPCS

## 2018-11-02 PROCEDURE — 97110 THERAPEUTIC EXERCISES: CPT

## 2018-11-02 NOTE — PROGRESS NOTES
Daily Note     Today's date: 2018  Patient name: Kirill Lincoln  : 1970  MRN: [id number]  Referring provider: Eli Britt MD  Dx:   Encounter Diagnosis     ICD-10-CM    1  Acute bilateral low back pain without sciatica M54 5                   Subjective: Pt reports 0-1/10 LBP today  Objective: See treatment diary below      Assessment: Tolerated treatment well  Patient exhibited good technique with therapeutic exercises , reviewed HEP with patient  Pt demonstrated proper understanding and technique of HEP  Plan: Pt returns to Dr Adore Fong 18        Precautions: none    Reassessment 2018    Daily Treatment Diary         Exercise Diary  10/30 11/2      Bridging x30 x30      Lower trunk rotation 5x 10" 5x10"      Adduction squeeze x30 x30      Bilateral hamstring stretch long sitting together 1 min 1 min      Wall slides x10 5 sec hold x15 x5sec      DKTC 5x5sec 5x5 sec      Prone on elbows  1'      plank 40 sec 35 sec      Standing extension 5 x 5sec 5x5 sec      Angry cat/camel x25 x30      Curl ups 30 x 5sec 30x  5sec      Pelvic tilt overhead arm extension #6 x30 6# x30      Hip flex stretch 3 x 10 sec 4 x10 sec      sidelying clam shell X 20 x25      Reviewed HEP  done              Pain pre treat  0-110      Pain post treat  0-1/10

## 2018-11-05 ENCOUNTER — TRANSCRIBE ORDERS (OUTPATIENT)
Dept: ADMINISTRATIVE | Facility: HOSPITAL | Age: 48
End: 2018-11-05

## 2018-11-05 DIAGNOSIS — M77.11 RIGHT TENNIS ELBOW: Primary | ICD-10-CM

## 2018-11-06 ENCOUNTER — OFFICE VISIT (OUTPATIENT)
Dept: PHYSICAL THERAPY | Facility: MEDICAL CENTER | Age: 48
End: 2018-11-06
Payer: COMMERCIAL

## 2018-11-06 DIAGNOSIS — M54.50 ACUTE BILATERAL LOW BACK PAIN WITHOUT SCIATICA: Primary | ICD-10-CM

## 2018-11-06 PROCEDURE — 97110 THERAPEUTIC EXERCISES: CPT

## 2018-11-06 NOTE — PROGRESS NOTES
Daily Note     Today's date: 2018  Patient name: Marlene Lee  : 1970  MRN: [id number]  Referring provider: Mani Vázquez MD  Dx:   Encounter Diagnosis     ICD-10-CM    1  Acute bilateral low back pain without sciatica M54 5                   Subjective: Pt reports dr was pleased with his progress to date, would like him to continue PT at this time progressing with ex program  Pt returns to Dr Sunday Day next month  Objective: See treatment diary below      Assessment: Tolerated treatment well  Patient exhibited good technique with therapeutic exercises and would benefit from continued PT to progress with ex program       Plan: Continue per plan of care       Precautions: none    Reassessment 2018    Daily Treatment Diary         Exercise Diary  10/30 11/2 11/6     Bridging x30 x30 x30     Lower trunk rotation 5x 10" 5x10" 5x10"     Adduction squeeze x30 x30 x30     Bilateral hamstring stretch long sitting together 1 min 1 min 1 min     Wall slides x10 5 sec hold x15 x5sec 20x5  sec     DKTC 5x5sec 5x5 sec 5x5"     Prone on elbows  1' 1'     plank 40 sec 35 sec 45 sec     Standing extension 5 x 5sec 5x5 sec 5x5 sec     Angry cat/camel x25 x30 x30     Curl ups 30 x 5sec 30x  5sec 30 x 5"     Pelvic tilt overhead arm extension #6 x30 6# x30 8# x30     Hip flex stretch 3 x 10 sec 4 x10 sec x1 min ea     sidelying clam shell X 20 x25 x30 with t-band     B bent leg lift   5x5 sec     B Hip flex isometric   10 x5sec     Supine brace with B leg lift from hip flex   10 x5 sec     Prone superman   UEs  10 x5 sec     Reviewed HEP  done done             Pain pre treat  0-1/10 1/10     Pain post treat  0-1/10 1/10

## 2018-11-09 ENCOUNTER — HOSPITAL ENCOUNTER (OUTPATIENT)
Dept: MRI IMAGING | Facility: HOSPITAL | Age: 48
Discharge: HOME | End: 2018-11-09
Payer: COMMERCIAL

## 2018-11-09 DIAGNOSIS — M77.11 RIGHT TENNIS ELBOW: ICD-10-CM

## 2018-11-09 PROCEDURE — 73221 MRI JOINT UPR EXTREM W/O DYE: CPT

## 2018-11-12 ENCOUNTER — OFFICE VISIT (OUTPATIENT)
Dept: PHYSICAL THERAPY | Facility: MEDICAL CENTER | Age: 48
End: 2018-11-12
Payer: COMMERCIAL

## 2018-11-12 DIAGNOSIS — M54.50 ACUTE BILATERAL LOW BACK PAIN WITHOUT SCIATICA: Primary | ICD-10-CM

## 2018-11-12 PROCEDURE — 97110 THERAPEUTIC EXERCISES: CPT

## 2018-11-12 NOTE — PROGRESS NOTES
Daily Note     Today's date: 2018  Patient name: Naveen Smith  : 1970  MRN: [id number]  Referring provider: Claude Johnson MD  Dx:   Encounter Diagnosis     ICD-10-CM    1  Acute bilateral low back pain without sciatica M54 5                   Subjective: Pt c/o 4/10 LBP with difficulty sleeping last couple of nights  States he was on his feet a lot the past couple of days  Objective: See treatment diary below      Assessment: Tolerated treatment well  Noted decreased symptoms following treatment today  Patient exhibited good technique with therapeutic exercises and would benefit from continued PT      Plan: Continue per plan of care           Precautions: none    Reassessment 2018    Daily Treatment Diary         Exercise Diary  10/30 11/2 11/6 11/12      Bridging x30 x30 x30 x30    Lower trunk rotation 5x 10" 5x10" 5x10" 5x10"    Adduction squeeze x30 x30 x30 x30    Bilateral hamstring stretch long sitting together 1 min 1 min 1 min 1 min    Wall slides x10 5 sec hold x15 x5sec 20x5  sec 25 x5     DKTC 5x5sec 5x5 sec 5x5" 5x5"    Prone on elbows  1' 1' 1'    plank 40 sec 35 sec 45 sec 45 sec    Standing extension 5 x 5sec 5x5 sec 5x5 sec 5 x5"    Angry cat/camel x25 x30 x30 x30 ea    Curl ups 30 x 5sec 30x  5sec 30 x 5" 30x5"    Pelvic tilt overhead arm extension #6 x30 6# x30 8# x30 8# x30    Hip flex stretch 3 x 10 sec 4 x10 sec x1 min ea 1 min ea    sidelying clam shell X 20 x25 x30 with t-band blk   x30    B bent leg lift   5x5 sec 10 x5"    B Hip flex isometric   10 x5sec 15 x5"    Supine brace with B leg lift from hip flex   10 x5 sec 10 x5 "    Prone superman   UEs  10 x5 sec 10 x5"    Reviewed HEP  done done done            Pain pre treat  0-1/10 1/10 410    Pain post treat  0-1/10 1/10 2/10

## 2018-11-15 ENCOUNTER — OFFICE VISIT (OUTPATIENT)
Dept: PHYSICAL THERAPY | Facility: MEDICAL CENTER | Age: 48
End: 2018-11-15
Payer: COMMERCIAL

## 2018-11-15 DIAGNOSIS — M54.50 ACUTE BILATERAL LOW BACK PAIN WITHOUT SCIATICA: Primary | ICD-10-CM

## 2018-11-15 PROCEDURE — 97110 THERAPEUTIC EXERCISES: CPT

## 2018-11-15 NOTE — PROGRESS NOTES
Daily Note     Today's date: 11/15/2018  Patient name: Brant Livingston  : 1970  MRN: [id number]  Referring provider: Michael Arango MD  Dx:   Encounter Diagnosis     ICD-10-CM    1  Acute bilateral low back pain without sciatica M54 5                   Subjective: Pt c/o 2/10 R LBP into R buttock pain  Objective: See treatment diary below      Assessment: Tolerated treatment well  Patient exhibited good technique with therapeutic exercises and would benefit from continued PT   Plan: Continue per plan of care           Precautions: none    Reassessment 2018    Daily Treatment Diary         Exercise Diary  10/30 11/2 11/6 11/12   11/15   Bridging x30 x30 x30 x30 x30   Lower trunk rotation 5x 10" 5x10" 5x10" 5x10" 5x10"   Adduction squeeze x30 x30 x30 x30 x30   Bilateral hamstring stretch long sitting together 1 min 1 min 1 min 1 min 1 min   Wall slides x10 5 sec hold x15 x5sec 20x5  sec 25 x5  30x5"   DKTC 5x5sec 5x5 sec 5x5" 5x5" 5x5"   Prone on elbows  1' 1' 1' 1'   plank 40 sec 35 sec 45 sec 45 sec 40'   Standing extension 5 x 5sec 5x5 sec 5x5 sec 5 x5" 5x5"   Angry cat/camel x25 x30 x30 x30 ea x30 ea   Curl ups 30 x 5sec 30x  5sec 30 x 5" 30x5" 30x5"   Pelvic tilt overhead arm extension #6 x30 6# x30 8# x30 8# x30 10#x30   Hip flex stretch 3 x 10 sec 4 x10 sec x1 min ea 1 min ea 1 min   sidelying clam shell X 20 x25 x30 with t-band blk   x30 blk x 30   B bent leg lift   5x5 sec 10 x5" 15 x5"   B Hip flex isometric   10 x5sec 15 x5" 15x5"   Supine brace with B leg lift from hip flex   10 x5 sec 10 x5 " 15x5"   Prone superman   UEs  10 x5 sec 10 x5" 15x5   Reviewed HEP  done done done -----           Pain pre treat  0-1/10 1/10 4/10 2/10   Pain post treat  0-1/10 1/10 2/10 2/10

## 2018-11-19 ENCOUNTER — OFFICE VISIT (OUTPATIENT)
Dept: PHYSICAL THERAPY | Facility: MEDICAL CENTER | Age: 48
End: 2018-11-19
Payer: COMMERCIAL

## 2018-11-19 DIAGNOSIS — M54.50 ACUTE BILATERAL LOW BACK PAIN WITHOUT SCIATICA: Primary | ICD-10-CM

## 2018-11-19 PROCEDURE — 97110 THERAPEUTIC EXERCISES: CPT

## 2018-11-19 NOTE — PROGRESS NOTES
Daily Note     Today's date: 2018  Patient name: Kirill Lincoln  : 1970  MRN: [id number]  Referring provider: Eli Britt MD  Dx:   Encounter Diagnosis     ICD-10-CM    1  Acute bilateral low back pain without sciatica M54 5        Start Time: 734  Stop Time: 813  Total time in clinic (min): 39 minutes    Subjective:Pt primarily c/o a m  stiffness LB  Objective: See treatment diary below      Assessment: Tolerated treatment well  Patient exhibited good technique with therapeutic exercises and would benefit from continued PT      Plan: Continue per plan of care         Precautions: none    Reassessment 2018    Daily Treatment Diary         Exercise Diary  11/15 11/19       Bridging x30 x30       Lower trunk rotation 5x10" 5x10"       Adduction squeeze x30 x30       Bilateral hamstring stretch long sitting together 1 min 1 min       Wall slides 30x5" 30x5"       DKTC 5x5" 5x5"       Prone on elbows 1' 1'       plank 40' 45'       Standing extension 5x5" 5x5"       Angry cat/camel x30 ea x30 ea       Curl ups 30x5" 30x5"       Pelvic tilt overhead arm extension 10#x30 10# x30       Hip flex stretch 1 min 1 min       sidelying clam shell blk x 30 blk x30       B bent leg lift 15 x5" 20x5"       B Hip flex isometric 15x5" 15 x5"       Supine brace with B leg lift from hip flex 15x5" 15 x5"       Prone superman 15x5 15 x5"       Reviewed HEP ----- ------                Pain pre treat 2/10 1/10       Pain post treat 2/10 1/10

## 2018-11-26 ENCOUNTER — EVALUATION (OUTPATIENT)
Dept: PHYSICAL THERAPY | Facility: MEDICAL CENTER | Age: 48
End: 2018-11-26
Payer: COMMERCIAL

## 2018-11-26 DIAGNOSIS — M54.50 ACUTE BILATERAL LOW BACK PAIN WITHOUT SCIATICA: Primary | ICD-10-CM

## 2018-11-26 PROCEDURE — G8982 BODY POS GOAL STATUS: HCPCS

## 2018-11-26 PROCEDURE — G8981 BODY POS CURRENT STATUS: HCPCS

## 2018-11-26 PROCEDURE — 97110 THERAPEUTIC EXERCISES: CPT

## 2018-11-26 NOTE — PROGRESS NOTES
Reassessment    Today's date: 2018  Patient name: Rachid Wiggins  : 1970  MRN: [id number]  Referring provider: Shane Becerril MD  Dx:   Encounter Diagnosis     ICD-10-CM    1  Acute bilateral low back pain without sciatica M54 5        Start Time: 1102       Assessment  Impairments: activity intolerance, impaired physical strength and pain with function    Assessment details: Pt is 57 y o  male seen for PT for diagnosis of axial pain secondary to sprain/strain, myofascial pain, SI pain  Received orders for PT eval and tx,including core strengthening, dynamic stabilization,lower extremity and spinal ROM  After previous Doctor visit increased intensity of core stabilization was recommended  Therefore plan of care was extended to complete more aggressive program and instruct in progression of update home exercise program  Comorbidities affecting pt's physical performance at time of assessment include: overweight  Prior level of function, pt was independent 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  Patient has been seen for 12 physical therapy visits  He notes significant improvement with pain level decreased to 1/10 and only stiffness in the AM   ROM is now [facility] except for hamstring tightness  He is independent in his home exercise program  Please find objective findings frome physical therapy re-assessment outlined below  Pt's clinical presentation is currently stable  Patient is agreeable to discharge to home exercises program in 2-3 more visits  Understanding of Dx/Px/POC: excellent  Goals  Short Term Goals to be achieved in 3-4 weeks  Patient will demonstrate correct body mechanics to prevent reinjury  Met  Pain level will decrease to 2-3/10 with functional activities   Met   Functional score as measured by Rubén Ny will improve to 70 Met  Long Term Goals to be achieved in 4-6weeks  Patient will be independent in home exercise program Met  Patient pain level will decrease to 2/10 Met  Patient ROM will increase to full lumbar flexion and hamstring ROM without pain progressing  Core muscle strength 4/5 progressing  Patrient will increase score on FOTO to 73 now at 72    Plan  Planned therapy interventions: abdominal trunk stabilization, patient education, strengthening, stretching, therapeutic exercise, flexibility and home exercise program  Frequency: 1-2 more visits  Duration in weeks: 1-2 weeks  Plan of Care beginning date: 10/8/2018  Plan of Care expiration date: 2018  Treatment plan discussed with: patient and PTA        Subjective Evaluation    History of Present Illness  Date of onset: 2018  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  So bad had to go to ER  Was seen by chiropractor  No relief in several visits  Went to see Dr Cindy Kruger  Was given injection into SI joints  States feels less stiff than before  Lifting causes spasms  Not a recurrent problem   Quality of life: excellent    Pain  Current pain ratin now 1/10  At best pain ratin now 0/10  At worst pain ratin  Location: both SI area and into gluteal regions  Quality: dull ache  Aggravating factors: running, sitting, standing and lifting  Progression: improved    Treatments  Previous treatment: injection treatment  Patient Goals  Patient goals for therapy: decreased pain, increased motion, increased strength, independence with ADLs/IADLs and return to sport/leisure activities  Patient goal: able to so things without pain        Objective       Active Range of Motion     Lumbar   Flexion: 75 degrees   Extension: 30 degrees   Left lateral flexion: WFL  Right lateral flexion: WFL  Left rotation: [facility]  Right rotation: [facility]    Additional Active Range of Motion Details  No reversal of lumbar curve    Passive Range of Motion     Additional Passive Range of Motion Details  Hamstring tightness bilateral at 45 degrees    Negative for SLR    Strength of core muscle are in the -4/5 range and patient is compliant with home exercises to continue with progression of strengthening    Subjective: I'm doing good  I see the doctor next week  I definitely feel better after I exercise      Objective: See treatment diary below      Assessment: Tolerated treatment well  Patient exhibited good technique with therapeutic exercises and still with core muscle weakness but does well with exercise program      Plan: Continue per plan of care    Plan discharge in next 1-2 weeks after MD visit          Precautions: none    Reassessment 12/26/2018    Daily Treatment Diary         Exercise Diary  11/15 11/19 11/26      Bridging x30 x30 x30      Lower trunk rotation 5x10" 5x10" 5 x 10"      Adduction squeeze x30 x30 X 30      Bilateral hamstring stretch long sitting together 1 min 1 min 1 min      Wall slides 30x5" 30x5"       DKTC 5x5" 5x5" 5 x 5"      Prone on elbows 1' 1' 1'      plank 40' 45' 50"      Standing extension 5x5" 5x5"       Angry cat/camel x30 ea x30 ea X 30 ea      Curl ups 30x5" 30x5" 30 x 5      Pelvic tilt overhead arm extension 10#x30 10# x30 #10 x 30      Hip flex stretch 1 min 1 min       sidelying clam shell blk x 30 blk x30 Black x 30      B bent leg lift 15 x5" 20x5" 20 x 5 sec      B Hip flex isometric 15x5" 15 x5" 15 x5      Supine brace with B leg lift from hip flex 15x5" 15 x5" 15 x 5      Prone superman 15x5 15 x5" 15 x 5      Reviewed HEP ----- ------                Pain pre treat 2/10 1/10 1/10      Pain post treat 2/10 1/10 0/10                            Flowsheet Rows      Most Recent Value   PT/OT G-Codes   Current Score  77   Projected Score  72   Assessment Type  Re-evaluation   G code set  Changing & Maintaining Body Position   Changing and Maintaining Body Position Current Status ()  CJ   Changing and Maintaining Body Position Goal Status ()  CJ

## 2018-11-27 ENCOUNTER — TRANSCRIBE ORDERS (OUTPATIENT)
Dept: PHYSICAL THERAPY | Facility: MEDICAL CENTER | Age: 48
End: 2018-11-27

## 2018-11-27 DIAGNOSIS — M54.50 BILATERAL LOW BACK PAIN WITHOUT SCIATICA, UNSPECIFIED CHRONICITY: Primary | ICD-10-CM

## 2018-11-29 ENCOUNTER — APPOINTMENT (OUTPATIENT)
Dept: PHYSICAL THERAPY | Facility: MEDICAL CENTER | Age: 48
End: 2018-11-29
Payer: COMMERCIAL

## 2019-03-18 ENCOUNTER — OFFICE VISIT (OUTPATIENT)
Dept: URGENT CARE | Facility: CLINIC | Age: 49
End: 2019-03-18
Payer: COMMERCIAL

## 2019-03-18 VITALS
DIASTOLIC BLOOD PRESSURE: 70 MMHG | HEIGHT: 70 IN | RESPIRATION RATE: 20 BRPM | HEART RATE: 65 BPM | OXYGEN SATURATION: 100 % | BODY MASS INDEX: 33.64 KG/M2 | WEIGHT: 235 LBS | TEMPERATURE: 98.3 F | SYSTOLIC BLOOD PRESSURE: 122 MMHG

## 2019-03-18 DIAGNOSIS — M62.830 LUMBAR PARASPINAL MUSCLE SPASM: Primary | ICD-10-CM

## 2019-03-18 PROCEDURE — 99213 OFFICE O/P EST LOW 20 MIN: CPT | Performed by: PHYSICIAN ASSISTANT

## 2019-03-18 RX ORDER — CYCLOBENZAPRINE HCL 10 MG
10 TABLET ORAL 3 TIMES DAILY PRN
Qty: 15 TABLET | Refills: 0 | Status: SHIPPED | OUTPATIENT
Start: 2019-03-18 | End: 2019-10-13 | Stop reason: ALTCHOICE

## 2019-03-18 RX ORDER — METHYLPREDNISOLONE 4 MG/1
TABLET ORAL
Qty: 1 EACH | Refills: 0 | Status: SHIPPED | OUTPATIENT
Start: 2019-03-18 | End: 2019-08-08 | Stop reason: ALTCHOICE

## 2019-03-18 NOTE — PROGRESS NOTES
[facility]        NAME: Romeo Jade is a 57 y o  male  : 1970    MRN: [id number]  DATE: 2019  TIME: 12:50 PM    Assessment and Plan   Lumbar paraspinal muscle spasm [M62 830]  1  Lumbar paraspinal muscle spasm  methylPREDNISolone 4 MG tablet therapy pack    cyclobenzaprine (FLEXERIL) 10 mg tablet         Patient Instructions     Cont heat/ice as tolerated and f/u with ortho  Follow up with PCP in 3-5 days  Proceed to  ER if symptoms worsen  Chief Complaint     Chief Complaint   Patient presents with    Back Pain     Bent over to  bix yesterday and lower back went into spasm         History of Present Illness       51-year-old male with history chronic lower back/SI joint pain prove presents for evaluation of lower back spasm onset yesterday  Patient states he bent down to  a box and when he came back his lower back immediately went into spasm  He has been doing ice, ibuprofen, heat with no improvement  Patient has an appointment with his orthopedic doctor on   He notes some pain radiating into bilateral legs but no numbness or tingling  No fever, chills, nausea, vomiting, saddle anesthesias, incontinence      Review of Systems   Review of Systems   All other systems reviewed and are negative          Current Medications       Current Outpatient Medications:     citalopram (CeleXA) 20 mg tablet, 20 mg daily  , Disp: , Rfl:     levothyroxine 75 mcg tablet, 75 mcg daily in the early morning  , Disp: , Rfl:     valsartan-hydrochlorothiazide (DIOVAN-HCT) 80-12 5 MG per tablet, Take 1 tablet by mouth daily, Disp: , Rfl:     cyclobenzaprine (FLEXERIL) 10 mg tablet, Take 1 tablet (10 mg total) by mouth 3 (three) times a day as needed for muscle spasms, Disp: 15 tablet, Rfl: 0    methylPREDNISolone 4 MG tablet therapy pack, Use as directed on package, Disp: 1 each, Rfl: 0    naproxen (NAPROSYN) 500 mg tablet, Take 1 tablet (500 mg total) by mouth 2 (two) times a day with meals (Patient not taking: Reported on 3/18/2019), Disp: 30 tablet, Rfl: 0    Current Allergies     Allergies as of 03/18/2019 - Reviewed 03/18/2019   Allergen Reaction Noted    Penicillins Rash 05/11/2018            The following portions of the patient's history were reviewed and updated as appropriate: allergies, current medications, past family history, past medical history, past social history, past surgical history and problem list      Past Medical History:   Diagnosis Date    Anxiety     Disease of thyroid gland     Hypertension        Past Surgical History:   Procedure Laterality Date    CHOLECYSTECTOMY      FOOT SURGERY Left     KNEE ARTHROPLASTY Right     NASAL SEPTUM SURGERY      ROTATOR CUFF REPAIR Bilateral        No family history on file  Medications have been verified  Objective   /70   Pulse 65   Temp 98 3 °F (36 8 °C) (Tympanic)   Resp 20   Ht 5' 10" (1 778 m)   Wt 107 kg (235 lb)   SpO2 100%   BMI 33 72 kg/m²        Physical Exam     Physical Exam   Constitutional: He appears well-developed and well-nourished  No distress  HENT:   Head: Normocephalic and atraumatic  Cardiovascular: Normal rate, regular rhythm and intact distal pulses  Exam reveals no gallop and no friction rub  No murmur heard  Pulmonary/Chest: Effort normal and breath sounds normal  No respiratory distress  He has no wheezes  He has no rales  Musculoskeletal:   Lumbar paraspinal muscle spasm and ttp  Decreased ROM lumbar spine, worse with flexion   Psychiatric: He has a normal mood and affect

## 2019-04-08 ENCOUNTER — TRANSCRIBE ORDERS (OUTPATIENT)
Dept: ADMINISTRATIVE | Facility: HOSPITAL | Age: 49
End: 2019-04-08

## 2019-04-08 DIAGNOSIS — M54.40 LOW BACK PAIN WITH SCIATICA, SCIATICA LATERALITY UNSPECIFIED, UNSPECIFIED BACK PAIN LATERALITY, UNSPECIFIED CHRONICITY: Primary | ICD-10-CM

## 2019-04-08 DIAGNOSIS — M53.3 DISORDER OF SACRUM: Primary | ICD-10-CM

## 2019-04-08 DIAGNOSIS — M53.3 SACRO-ILIAC PAIN: ICD-10-CM

## 2019-04-22 ENCOUNTER — HOSPITAL ENCOUNTER (OUTPATIENT)
Dept: MRI IMAGING | Facility: HOSPITAL | Age: 49
Discharge: HOME | End: 2019-04-22
Attending: PHYSICAL MEDICINE & REHABILITATION
Payer: COMMERCIAL

## 2019-04-22 ENCOUNTER — HOSPITAL ENCOUNTER (OUTPATIENT)
Dept: MRI IMAGING | Facility: HOSPITAL | Age: 49
Discharge: HOME | End: 2019-04-22
Attending: PHYSICAL MEDICINE & REHABILITATION

## 2019-04-22 DIAGNOSIS — M54.40 LOW BACK PAIN WITH SCIATICA, SCIATICA LATERALITY UNSPECIFIED, UNSPECIFIED BACK PAIN LATERALITY, UNSPECIFIED CHRONICITY: ICD-10-CM

## 2019-04-22 DIAGNOSIS — M53.3 DISORDER OF SACRUM: ICD-10-CM

## 2019-04-22 PROCEDURE — 72148 MRI LUMBAR SPINE W/O DYE: CPT

## 2019-04-30 ENCOUNTER — HOSPITAL ENCOUNTER (OUTPATIENT)
Dept: MRI IMAGING | Facility: HOSPITAL | Age: 49
Discharge: HOME | End: 2019-04-30
Attending: PHYSICAL MEDICINE & REHABILITATION
Payer: COMMERCIAL

## 2019-04-30 DIAGNOSIS — M53.3 SACRO-ILIAC PAIN: ICD-10-CM

## 2019-04-30 PROCEDURE — 72195 MRI PELVIS W/O DYE: CPT

## 2019-06-20 ENCOUNTER — TRANSCRIBE ORDERS (OUTPATIENT)
Dept: LAB | Facility: CLINIC | Age: 49
End: 2019-06-20

## 2019-06-20 ENCOUNTER — APPOINTMENT (OUTPATIENT)
Dept: LAB | Facility: CLINIC | Age: 49
End: 2019-06-20
Payer: COMMERCIAL

## 2019-06-20 DIAGNOSIS — I51.9 MYXEDEMA HEART DISEASE: ICD-10-CM

## 2019-06-20 DIAGNOSIS — E61.1 IRON DEFICIENCY: ICD-10-CM

## 2019-06-20 DIAGNOSIS — I10 HYPERTENSION, UNSPECIFIED TYPE: ICD-10-CM

## 2019-06-20 DIAGNOSIS — E53.8 VITAMIN B 12 DEFICIENCY: ICD-10-CM

## 2019-06-20 DIAGNOSIS — E03.9 MYXEDEMA HEART DISEASE: ICD-10-CM

## 2019-06-20 DIAGNOSIS — E53.8 VITAMIN B 12 DEFICIENCY: Primary | ICD-10-CM

## 2019-06-20 LAB
ALBUMIN SERPL BCP-MCNC: 3.9 G/DL (ref 3.5–5)
ALP SERPL-CCNC: 60 U/L (ref 46–116)
ALT SERPL W P-5'-P-CCNC: 32 U/L (ref 12–78)
ANION GAP SERPL CALCULATED.3IONS-SCNC: 5 MMOL/L (ref 4–13)
AST SERPL W P-5'-P-CCNC: 24 U/L (ref 5–45)
BASOPHILS # BLD AUTO: 0.08 THOUSANDS/ΜL (ref 0–0.1)
BASOPHILS NFR BLD AUTO: 1 % (ref 0–1)
BILIRUB SERPL-MCNC: 0.34 MG/DL (ref 0.2–1)
BILIRUB UR QL STRIP: NEGATIVE
BUN SERPL-MCNC: 12 MG/DL (ref 5–25)
CALCIUM SERPL-MCNC: 8.5 MG/DL (ref 8.3–10.1)
CHLORIDE SERPL-SCNC: 102 MMOL/L (ref 100–108)
CHOLEST SERPL-MCNC: 195 MG/DL (ref 50–200)
CLARITY UR: CLEAR
CO2 SERPL-SCNC: 28 MMOL/L (ref 21–32)
COLOR UR: YELLOW
CREAT SERPL-MCNC: 0.76 MG/DL (ref 0.6–1.3)
EOSINOPHIL # BLD AUTO: 0.18 THOUSAND/ΜL (ref 0–0.61)
EOSINOPHIL NFR BLD AUTO: 3 % (ref 0–6)
ERYTHROCYTE [DISTWIDTH] IN BLOOD BY AUTOMATED COUNT: 14.7 % (ref 11.6–15.1)
GFR SERPL CREATININE-BSD FRML MDRD: 109 ML/MIN/1.73SQ M
GLUCOSE P FAST SERPL-MCNC: 93 MG/DL (ref 65–99)
GLUCOSE UR STRIP-MCNC: NEGATIVE MG/DL
HCT VFR BLD AUTO: 38.9 % (ref 36.5–49.3)
HDLC SERPL-MCNC: 69 MG/DL (ref 40–60)
HGB BLD-MCNC: 12.4 G/DL (ref 12–17)
HGB UR QL STRIP.AUTO: NEGATIVE
IMM GRANULOCYTES # BLD AUTO: 0.01 THOUSAND/UL (ref 0–0.2)
IMM GRANULOCYTES NFR BLD AUTO: 0 % (ref 0–2)
IRON SERPL-MCNC: 44 UG/DL (ref 65–175)
KETONES UR STRIP-MCNC: NEGATIVE MG/DL
LDLC SERPL CALC-MCNC: 102 MG/DL (ref 0–100)
LEUKOCYTE ESTERASE UR QL STRIP: NEGATIVE
LYMPHOCYTES # BLD AUTO: 1.91 THOUSANDS/ΜL (ref 0.6–4.47)
LYMPHOCYTES NFR BLD AUTO: 30 % (ref 14–44)
MCH RBC QN AUTO: 27.6 PG (ref 26.8–34.3)
MCHC RBC AUTO-ENTMCNC: 31.9 G/DL (ref 31.4–37.4)
MCV RBC AUTO: 86 FL (ref 82–98)
MONOCYTES # BLD AUTO: 0.6 THOUSAND/ΜL (ref 0.17–1.22)
MONOCYTES NFR BLD AUTO: 9 % (ref 4–12)
NEUTROPHILS # BLD AUTO: 3.65 THOUSANDS/ΜL (ref 1.85–7.62)
NEUTS SEG NFR BLD AUTO: 57 % (ref 43–75)
NITRITE UR QL STRIP: NEGATIVE
NONHDLC SERPL-MCNC: 126 MG/DL
NRBC BLD AUTO-RTO: 0 /100 WBCS
PH UR STRIP.AUTO: 6 [PH]
PLATELET # BLD AUTO: 416 THOUSANDS/UL (ref 149–390)
PMV BLD AUTO: 9.8 FL (ref 8.9–12.7)
POTASSIUM SERPL-SCNC: 4 MMOL/L (ref 3.5–5.3)
PROT SERPL-MCNC: 7 G/DL (ref 6.4–8.2)
PROT UR STRIP-MCNC: NEGATIVE MG/DL
RBC # BLD AUTO: 4.5 MILLION/UL (ref 3.88–5.62)
SODIUM SERPL-SCNC: 135 MMOL/L (ref 136–145)
SP GR UR STRIP.AUTO: 1.01 (ref 1–1.03)
T4 FREE SERPL-MCNC: 0.97 NG/DL (ref 0.76–1.46)
TRIGL SERPL-MCNC: 118 MG/DL
TSH SERPL DL<=0.05 MIU/L-ACNC: 3.58 UIU/ML (ref 0.36–3.74)
UROBILINOGEN UR QL STRIP.AUTO: 0.2 E.U./DL
VIT B12 SERPL-MCNC: 132 PG/ML (ref 100–900)
WBC # BLD AUTO: 6.43 THOUSAND/UL (ref 4.31–10.16)

## 2019-06-20 PROCEDURE — 36415 COLL VENOUS BLD VENIPUNCTURE: CPT

## 2019-06-20 PROCEDURE — 80053 COMPREHEN METABOLIC PANEL: CPT

## 2019-06-20 PROCEDURE — 81003 URINALYSIS AUTO W/O SCOPE: CPT | Performed by: INTERNAL MEDICINE

## 2019-06-20 PROCEDURE — 82607 VITAMIN B-12: CPT

## 2019-06-20 PROCEDURE — 84439 ASSAY OF FREE THYROXINE: CPT

## 2019-06-20 PROCEDURE — 80061 LIPID PANEL: CPT

## 2019-06-20 PROCEDURE — 83540 ASSAY OF IRON: CPT

## 2019-06-20 PROCEDURE — 84443 ASSAY THYROID STIM HORMONE: CPT

## 2019-06-20 PROCEDURE — 85025 COMPLETE CBC W/AUTO DIFF WBC: CPT

## 2019-08-06 ENCOUNTER — HOSPITAL ENCOUNTER (OUTPATIENT)
Dept: SURGERY | Facility: HOSPITAL | Age: 49
Discharge: HOME | End: 2019-08-06
Payer: COMMERCIAL

## 2019-08-06 VITALS
BODY MASS INDEX: 35.07 KG/M2 | RESPIRATION RATE: 18 BRPM | TEMPERATURE: 96.9 F | SYSTOLIC BLOOD PRESSURE: 147 MMHG | WEIGHT: 245 LBS | OXYGEN SATURATION: 96 % | DIASTOLIC BLOOD PRESSURE: 83 MMHG | HEIGHT: 70 IN | HEART RATE: 68 BPM

## 2019-08-06 PROCEDURE — 96365 THER/PROPH/DIAG IV INF INIT: CPT

## 2019-08-06 RX ADMIN — IRON SUCROSE 300 MG: 20 INJECTION, SOLUTION INTRAVENOUS at 12:12

## 2019-08-06 NOTE — DISCHARGE INSTRUCTIONS
Iron Sucrose (By injection)   Iron Sucrose (EYE-urn EVA-krose)  Treats anemia (lack of iron)  Brand Name(s): [employer]Pro RX Venofer, Venofer   There may be other brand names for this medicine  When This Medicine Should Not Be Used: This medicine is not right for everyone  You should not receive it if you had an allergic reaction to iron sucrose  How to Use This Medicine:   Injectable  · Your doctor will prescribe your dose and schedule  This medicine is given through a needle placed in a vein  · A nurse or other health provider will give you this medicine  Drugs and Foods to Avoid:      Ask your doctor or pharmacist before using any other medicine, including over-the-counter medicines, vitamins, and herbal products  Warnings While Using This Medicine:   · Tell your doctor if you are pregnant or breastfeeding, or if you have low blood pressure  · This medicine could lower your blood pressure too much and cause you to feel dizzy or lightheaded  Stand or sit up slowly if you are dizzy  · This medicine could raise your iron levels too high  Your doctor will do lab tests at regular visits to check on the effects of this medicine  Keep all appointments  Possible Side Effects While Using This Medicine:   Call your doctor right away if you notice any of these side effects:  · Allergic reaction: Itching or hives, swelling in your face or hands, swelling or tingling in your mouth or throat, chest tightness, trouble breathing  · Chest pain  · Lightheadedness, dizziness, or fainting  If you notice these less serious side effects, talk with your doctor:   · Diarrhea, nausea, vomiting  · Headache  · Muscle cramps  · Pain in your back, arms, or legs  · Pain, itching, burning, swelling, or a lump under your skin where the needle is placed  If you notice other side effects that you think are caused by this medicine, tell your doctor  Call your doctor for medical advice about side effects   You may report side effects to FDA at 5-140-FDA-6217  © 2017 [employer] Information is for End User's use only and may not be sold, redistributed or otherwise used for commercial purposes  The above information is an  only  It is not intended as medical advice for individual conditions or treatments  Talk to your doctor, nurse or pharmacist before following any medical regimen to see if it is safe and effective for you

## 2019-08-08 ENCOUNTER — HOSPITAL ENCOUNTER (OUTPATIENT)
Dept: SURGERY | Facility: HOSPITAL | Age: 49
Discharge: HOME | End: 2019-08-08
Payer: COMMERCIAL

## 2019-08-08 VITALS
BODY MASS INDEX: 35.07 KG/M2 | OXYGEN SATURATION: 98 % | RESPIRATION RATE: 18 BRPM | DIASTOLIC BLOOD PRESSURE: 87 MMHG | WEIGHT: 245 LBS | HEIGHT: 70 IN | SYSTOLIC BLOOD PRESSURE: 139 MMHG | HEART RATE: 79 BPM | TEMPERATURE: 98 F

## 2019-08-08 PROCEDURE — 96366 THER/PROPH/DIAG IV INF ADDON: CPT

## 2019-08-08 PROCEDURE — 96365 THER/PROPH/DIAG IV INF INIT: CPT

## 2019-08-08 RX ORDER — MONTELUKAST SODIUM 10 MG/1
10 TABLET ORAL
COMMUNITY

## 2019-08-08 RX ADMIN — IRON SUCROSE 300 MG: 20 INJECTION, SOLUTION INTRAVENOUS at 14:21

## 2019-08-21 ENCOUNTER — TRANSCRIBE ORDERS (OUTPATIENT)
Dept: LAB | Facility: CLINIC | Age: 49
End: 2019-08-21

## 2019-08-21 ENCOUNTER — APPOINTMENT (OUTPATIENT)
Dept: LAB | Facility: CLINIC | Age: 49
End: 2019-08-21
Payer: COMMERCIAL

## 2019-08-21 DIAGNOSIS — E61.1 IRON DEFICIENCY: Primary | ICD-10-CM

## 2019-08-21 DIAGNOSIS — E61.1 IRON DEFICIENCY: ICD-10-CM

## 2019-08-21 LAB
ERYTHROCYTE [DISTWIDTH] IN BLOOD BY AUTOMATED COUNT: 16.1 % (ref 11.6–15.1)
HCT VFR BLD AUTO: 40 % (ref 36.5–49.3)
HGB BLD-MCNC: 13 G/DL (ref 12–17)
IRON SERPL-MCNC: 123 UG/DL (ref 65–175)
MCH RBC QN AUTO: 28.3 PG (ref 26.8–34.3)
MCHC RBC AUTO-ENTMCNC: 32.5 G/DL (ref 31.4–37.4)
MCV RBC AUTO: 87 FL (ref 82–98)
PLATELET # BLD AUTO: 351 THOUSANDS/UL (ref 149–390)
PMV BLD AUTO: 10.1 FL (ref 8.9–12.7)
RBC # BLD AUTO: 4.6 MILLION/UL (ref 3.88–5.62)
WBC # BLD AUTO: 7.32 THOUSAND/UL (ref 4.31–10.16)

## 2019-08-21 PROCEDURE — 85027 COMPLETE CBC AUTOMATED: CPT

## 2019-08-21 PROCEDURE — 83540 ASSAY OF IRON: CPT

## 2019-08-21 PROCEDURE — 36415 COLL VENOUS BLD VENIPUNCTURE: CPT

## 2019-10-13 ENCOUNTER — HOSPITAL ENCOUNTER (EMERGENCY)
Facility: HOSPITAL | Age: 49
Discharge: HOME | End: 2019-10-13
Attending: EMERGENCY MEDICINE | Admitting: EMERGENCY MEDICINE
Payer: COMMERCIAL

## 2019-10-13 VITALS
DIASTOLIC BLOOD PRESSURE: 78 MMHG | HEART RATE: 89 BPM | TEMPERATURE: 98.4 F | WEIGHT: 243.4 LBS | OXYGEN SATURATION: 95 % | SYSTOLIC BLOOD PRESSURE: 134 MMHG | RESPIRATION RATE: 17 BRPM | BODY MASS INDEX: 34.84 KG/M2 | HEIGHT: 70 IN

## 2019-10-13 DIAGNOSIS — L30.9 DERMATITIS: Primary | ICD-10-CM

## 2019-10-13 PROCEDURE — 99284 EMERGENCY DEPT VISIT MOD MDM: CPT | Performed by: EMERGENCY MEDICINE

## 2019-10-13 PROCEDURE — 99282 EMERGENCY DEPT VISIT SF MDM: CPT

## 2019-10-13 RX ORDER — PREDNISONE 10 MG/1
TABLET ORAL
Qty: 28 TABLET | Refills: 0 | Status: SHIPPED | OUTPATIENT
Start: 2019-10-13 | End: 2019-10-25

## 2019-10-13 RX ORDER — FAMOTIDINE 20 MG/1
40 TABLET, FILM COATED ORAL ONCE
Status: COMPLETED | OUTPATIENT
Start: 2019-10-13 | End: 2019-10-13

## 2019-10-13 RX ORDER — PREDNISONE 20 MG/1
60 TABLET ORAL ONCE
Status: COMPLETED | OUTPATIENT
Start: 2019-10-13 | End: 2019-10-13

## 2019-10-13 RX ADMIN — PREDNISONE 60 MG: 20 TABLET ORAL at 13:26

## 2019-10-13 RX ADMIN — FAMOTIDINE 40 MG: 20 TABLET ORAL at 13:26

## 2019-10-13 NOTE — DISCHARGE INSTRUCTIONS
For next 3 days:    Benedryl (Diphenhydramine) 25-50mg every 6 hours OR Zyrtec (cetrizine) 10mg once or twice daily (Either medicine over the counter)    Prednisone taper with food  Pepcid (famotidine) 40mg daily or Zantac (raniditine) 300mg daily (Either medicine over the counter) - continue while on prednisone      Urticaria   WHAT YOU NEED TO KNOW:   Urticaria is also called hives  Hives can change size and shape, and appear anywhere on your skin  They can be mild or severe and last from a few minutes to a few days  Hives may be a sign of a severe allergic reaction called anaphylaxis that needs immediate treatment  Urticaria that lasts longer than 6 weeks may be a chronic condition that needs long-term treatment  DISCHARGE INSTRUCTIONS:   Call 911 for signs or symptoms of anaphylaxis,  such as trouble breathing, swelling in your mouth or throat, or wheezing  You may also have itching, a rash, or feel like you are going to faint  Return to the emergency department if:   Your heart is beating faster than it normally does  You have cramping or severe pain in your abdomen  Contact your healthcare provider if:   You have a fever  Your skin still itches 24 hours after you take your medicine  You still have hives after 7 days  Your joints are painful and swollen  You have questions or concerns about your condition or care  Medicines:   Epinephrine  is used to treat severe allergic reactions such as anaphylaxis  Antihistamines  decrease mild symptoms such as itching or a rash  Steroids  decrease redness, pain, and swelling  Take your medicine as directed  Contact your healthcare provider if you think your medicine is not helping or if you have side effects  Tell him of her if you are allergic to any medicine  Keep a list of the medicines, vitamins, and herbs you take  Include the amounts, and when and why you take them  Bring the list or the pill bottles to follow-up visits  Carry your medicine list with you in case of an emergency  Steps to take for signs or symptoms of anaphylaxis:   Immediately  give 1 shot of epinephrine only into the outer thigh muscle  Leave the shot in place  as directed  Your healthcare provider may recommend you leave it in place for up to 10 seconds before you remove it  This helps make sure all of the epinephrine is delivered  Call 911 and go to the emergency department,  even if the shot improved symptoms  Do not drive yourself  Bring the used epinephrine shot with you  Safety precautions to take if you are at risk for anaphylaxis:   Keep 2 shots of epinephrine with you at all times  You may need a second shot, because epinephrine only works for about 20 minutes and symptoms may return  Your healthcare provider can show you and family members how to give the shot  Check the expiration date every month and replace it before it expires  Create an action plan  Your healthcare provider can help you create a written plan that explains the allergy and an emergency plan to treat a reaction  The plan explains when to give a second epinephrine shot if symptoms return or do not improve after the first  Give copies of the action plan and emergency instructions to family members, work and school staff, and  providers  Show them how to give a shot of epinephrine  Be careful when you exercise  If you have had exercise-induced anaphylaxis, do not exercise right after you eat  Stop exercising right away if you start to develop any signs or symptoms of anaphylaxis  You may first feel tired, warm, or have itchy skin  Hives, swelling, and severe breathing problems may develop if you continue to exercise  Carry medical alert identification  Wear medical alert jewelry or carry a card that explains the allergy  Ask your healthcare provider where to get these items  Keep a record of triggers and symptoms    Record everything you eat, drink, or apply to your skin for 3 weeks  Include stressful events and what you were doing right before your hives started  Bring the record with you to follow-up visits with your healthcare provider  Manage urticaria:   Cool your skin  This may help decrease itching  Apply a cool pack to your hives  Dip a hand towel in cool water, wring it out, and place it on your hives  You may also soak your skin in a cool oatmeal bath  Do not rub your hives  This can irritate your skin and cause more hives  Wear loose clothing  Tight clothes may irritate your skin and cause more hives  Manage stress  Stress may trigger hives, or make them worse  Learn new ways to relax, such as deep breathing  Follow up with your healthcare provider as directed:  Write down your questions so you remember to ask them during your visits  © 2017 [employer] Information is for End User's use only and may not be sold, redistributed or otherwise used for commercial purposes  All illustrations and images included in CareNotes® are the copyrighted property of [employer]  or [employer]  The above information is an  only  It is not intended as medical advice for individual conditions or treatments  Talk to your doctor, nurse or pharmacist before following any medical regimen to see if it is safe and effective for you

## 2019-10-13 NOTE — ED PROVIDER NOTES
History  Chief Complaint   Patient presents with    Rash     Pt reports rash on his chest, arms and face  Denies any new soaps, foods or medications     82-year-old male presents with rash it started on chest he woke up this is it this morning it is slightly itchy  The rash then spread to the remainder of his trunk there is a couple spots on his legs and his scalp  He denies any difficulty breathing there has been no lip swelling no difficulty swallowing no voice change no tongue thickness no intraoral lesions  Patient has not been any new medications there is no environmental exposures such as being in the woods or clearing brush  Patient denies any nausea vomiting diarrhea there is no chest pain no wheezing no shortness of breath  He has no fever chills he otherwise feels well no malaise  Does not involve his groin          Prior to Admission Medications   Prescriptions Last Dose Informant Patient Reported? Taking?    Ascorbic Acid (VITAMIN C) 1000 MG tablet 10/13/2019 at Unknown time Self Yes Yes   Sig: Take 1,000 mg by mouth daily   EPINEPHrine (EPIPEN) 0 3 mg/0 3 mL SOAJ   No Yes   Sig: Inject 0 3 mL (0 3 mg total) into a muscle once for 1 dose As needed for allergy reaction   Multiple Vitamin (MULTIVITAMIN) tablet 10/13/2019 at Unknown time Self Yes Yes   Sig: Take 1 tablet by mouth daily   Tuberculin-Allergy Syringes (ALLERGY SYRINGE 1CC/27GX1/2") 27G X 1/2" 1 ML MISC   No Yes   Sig: by Does not apply route once a week 2 allergy injections once per month   citalopram (CeleXA) 20 mg tablet 10/13/2019 at Unknown time Self Yes Yes   Si mg daily     levothyroxine 75 mcg tablet 10/13/2019 at Unknown time Self Yes Yes   Si mcg daily in the early morning     montelukast (SINGULAIR) 10 mg tablet 10/12/2019 at Unknown time Self Yes Yes   Sig: Take 10 mg by mouth daily at bedtime   predniSONE 20 mg tablet   No Yes   Sig: 3 tabs by mouth daily x 3 days, 2 tabs by mouth daily x 3 days, then 1 tab by mouth daily x 3 days   Patient taking differently: as needed 3 tabs by mouth daily x 3 days, 2 tabs by mouth daily x 3 days, then 1 tab by mouth daily x 3 days   valsartan-hydrochlorothiazide (DIOVAN-HCT) 80-12 5 MG per tablet 10/13/2019 at Unknown time Self Yes Yes   Sig: Take 1 tablet by mouth daily      Facility-Administered Medications: None       Past Medical History:   Diagnosis Date    Allergic rhinitis     Anxiety     Disease of thyroid gland     Hypertension        Past Surgical History:   Procedure Laterality Date    ADENOIDECTOMY      CHOLECYSTECTOMY      FOOT SURGERY Left     KNEE ARTHROPLASTY Right     NASAL SEPTUM SURGERY      ROTATOR CUFF REPAIR Bilateral     TONSILLECTOMY         History reviewed  No pertinent family history  I have reviewed and agree with the history as documented  Social History     Tobacco Use    Smoking status: Never Smoker    Smokeless tobacco: Never Used   Substance Use Topics    Alcohol use: Yes     Comment: socially    Drug use: No        Review of Systems   Constitutional: Negative for activity change, appetite change, chills and fever  HENT: Negative for mouth sores, sore throat, trouble swallowing and voice change  Eyes: Negative for discharge, redness and itching  Respiratory: Negative for cough and shortness of breath  Cardiovascular: Negative for chest pain and leg swelling  Gastrointestinal: Negative for abdominal pain, diarrhea, nausea and vomiting  Genitourinary: Negative for difficulty urinating  Musculoskeletal: Negative for back pain  Skin: Positive for rash  Negative for wound  Neurological: Negative for dizziness, speech difficulty and weakness  Psychiatric/Behavioral: Negative for confusion  All other systems reviewed and are negative  Physical Exam  Physical Exam   Constitutional: He is oriented to person, place, and time  He appears well-developed  No distress  HENT:   Head: Normocephalic and atraumatic     Right Ear: External ear normal    Left Ear: External ear normal    Nose: Nose normal    Mouth/Throat: Oropharynx is clear and moist    TMs pale no intraoral lesions posterior pharynx normal uvula is midline there is no erythema edema or exudate   Eyes: Pupils are equal, round, and reactive to light  Conjunctivae and EOM are normal  Right eye exhibits no discharge  Left eye exhibits no discharge  No scleral icterus  Neck: Normal range of motion  Neck supple  Cardiovascular: Normal rate, regular rhythm, normal heart sounds and intact distal pulses  Pulmonary/Chest: Effort normal and breath sounds normal  No respiratory distress  He has no wheezes  Abdominal: Soft  Bowel sounds are normal  He exhibits no distension and no mass  There is no tenderness  There is no rebound and no guarding  Back: no mildline or CVA tenderness   Musculoskeletal: Normal range of motion  He exhibits no edema, tenderness or deformity  Lymphadenopathy:     He has no cervical adenopathy  Neurological: He is alert and oriented to person, place, and time  No cranial nerve deficit or sensory deficit  He exhibits normal muscle tone  Coordination normal    Gait steady   Skin: Skin is warm and dry  Capillary refill takes less than 2 seconds  He is not diaphoretic  Macular papular rash to the trunk forehead no no intraoral lesions spares the palms and soles  No vesicular lesions no pustules blanches no petechiae   Psychiatric: He has a normal mood and affect  Nursing note and vitals reviewed        Vital Signs  ED Triage Vitals [10/13/19 1244]   Temperature Pulse Respirations Blood Pressure SpO2   98 4 °F (36 9 °C) 89 17 134/78 95 %      Temp Source Heart Rate Source Patient Position - Orthostatic VS BP Location FiO2 (%)   Temporal Monitor Sitting Left arm --      Pain Score       No Pain           Vitals:    10/13/19 1244   BP: 134/78   Pulse: 89   Patient Position - Orthostatic VS: Sitting         Visual Acuity      ED Medications  Medications   predniSONE tablet 60 mg (60 mg Oral Given 10/13/19 1326)   famotidine (PEPCID) tablet 40 mg (40 mg Oral Given 10/13/19 1326)       Diagnostic Studies  Results Reviewed     None                 No orders to display              Procedures  Procedures       ED Course                               MDM  Number of Diagnoses or Management Options  Dermatitis:   Diagnosis management comments: Mdm:  Dermatitis on the most prominently the trunk it is on the extremities and scalp is rather itchy  It is macular papular differential diagnosis includes urticaria not consistent with either or an infestation of scabies or lice no vesicular component making herpwoform  Unlikely; may be an allergic reaction secondary to eating some fish last night but he has had no GI symptoms associated with that patient has no evidence of angioedema he is on an ACE-inhibitor but is unlikely the cause  Not consistent with an infectious rash such as strep Lyme or measles  Will initiate symptomatic management prednisone, H2 blocker, and histamine such as Benadryl or Zyrtec  Patient return with any infectious symptoms such as pus drainage cellulitis intraoral involvement difficulty breathing lightheadedness or any new or worsening symptoms  Disposition  Final diagnoses:   Dermatitis - urticaria vs other     Time reflects when diagnosis was documented in both MDM as applicable and the Disposition within this note     Time User Action Codes Description Comment    10/13/2019  1:26 PM Nava Ours Add [L30 9] Dermatitis     10/13/2019  1:26 PM Nava Ours Modify [L30 9] Dermatitis urticaria vs other      ED Disposition     ED Disposition Condition Date/Time Comment    Discharge Stable Sun Oct 13, 2019  1:27 PM Anabela Cowart discharge to home/self care              Follow-up Information     Follow up With Specialties Details Why Galina Robertson MD Nephrology, Internal Medicine Go in 3 days if not improving [address] [place] [id number]  [phone]            Discharge Medication List as of 10/13/2019  2:01 PM      START taking these medications    Details   ! ! predniSONE 10 mg tablet Multiple Dosages:Starting Sun 10/13/2019, Last dose on Tue 10/15/2019, THEN Starting Wed 10/16/2019, Last dose on Fri 10/18/2019, THEN Starting Sat 10/19/2019, Last dose on Sun 10/20/2019, THEN Starting Mon 10/21/2019, Last dose on Tue 10/22/2019, TH EN Starting Wed 10/23/2019, Last dose on Thu 10/24/2019Take 4 tablets (40 mg total) by mouth daily for 3 days, THEN 3 tablets (30 mg total) daily for 3 days, THEN 2 tablets (20 mg total) daily for 2 days, THEN 1 tablet (10 mg total) daily for 2 days, T HEN 0 5 tablets (5 mg total) daily for 2 days  Take with food  , Print       !! - Potential duplicate medications found  Please discuss with provider        CONTINUE these medications which have NOT CHANGED    Details   Ascorbic Acid (VITAMIN C) 1000 MG tablet Take 1,000 mg by mouth daily, Historical Med      citalopram (CeleXA) 20 mg tablet 20 mg daily  , Starting Mon 12/13/2010, Historical Med      EPINEPHrine (EPIPEN) 0 3 mg/0 3 mL SOAJ Inject 0 3 mL (0 3 mg total) into a muscle once for 1 dose As needed for allergy reaction, Starting Mon 8/12/2019, Normal      levothyroxine 75 mcg tablet 75 mcg daily in the early morning  , Starting Wed 12/2/2015, Historical Med      montelukast (SINGULAIR) 10 mg tablet Take 10 mg by mouth daily at bedtime, Historical Med      Multiple Vitamin (MULTIVITAMIN) tablet Take 1 tablet by mouth daily, Historical Med      !! predniSONE 20 mg tablet 3 tabs by mouth daily x 3 days, 2 tabs by mouth daily x 3 days, then 1 tab by mouth daily x 3 days, Normal      Tuberculin-Allergy Syringes (ALLERGY SYRINGE 1CC/27GX1/2") 27G X 1/2" 1 ML MISC by Does not apply route once a week 2 allergy injections once per month, Starting Mon 8/12/2019, Normal valsartan-hydrochlorothiazide (DIOVAN-HCT) 80-12 5 MG per tablet Take 1 tablet by mouth daily, Historical Med       !! - Potential duplicate medications found  Please discuss with provider  No discharge procedures on file      ED Provider  Electronically Signed by           Neda Chandra MD  10/13/19 4072

## 2019-10-22 ENCOUNTER — VBI (OUTPATIENT)
Dept: ADMINISTRATIVE | Facility: OTHER | Age: 49
End: 2019-10-22

## 2019-11-14 DIAGNOSIS — R07.89 OTHER CHEST PAIN: Primary | ICD-10-CM

## 2019-11-15 ENCOUNTER — CONSULT (OUTPATIENT)
Dept: CARDIOLOGY CLINIC | Facility: CLINIC | Age: 49
End: 2019-11-15
Payer: COMMERCIAL

## 2019-11-15 VITALS
BODY MASS INDEX: 34.65 KG/M2 | HEIGHT: 70 IN | SYSTOLIC BLOOD PRESSURE: 122 MMHG | HEART RATE: 90 BPM | DIASTOLIC BLOOD PRESSURE: 78 MMHG | WEIGHT: 242 LBS

## 2019-11-15 DIAGNOSIS — I10 BENIGN ESSENTIAL HTN: ICD-10-CM

## 2019-11-15 DIAGNOSIS — R07.89 OTHER CHEST PAIN: Primary | ICD-10-CM

## 2019-11-15 PROBLEM — Z82.49 FAMILY HISTORY OF ISCHEMIC HEART DISEASE: Status: ACTIVE | Noted: 2019-11-15

## 2019-11-15 PROCEDURE — 99244 OFF/OP CNSLTJ NEW/EST MOD 40: CPT | Performed by: INTERNAL MEDICINE

## 2019-11-15 RX ORDER — LATANOPROST 50 UG/ML
SOLUTION/ DROPS OPHTHALMIC
Refills: 1 | COMMUNITY
Start: 2019-10-22

## 2019-11-15 NOTE — ASSESSMENT & PLAN NOTE
A mix of typical and atypical features but more atypical   Given family history of early CAD as well as history of hypertension, I am going to arrange a stress echo

## 2019-11-15 NOTE — PROGRESS NOTES
Patient ID: Rolanda Adam is a 54 y o  male  Plan:      Other chest pain  A mix of typical and atypical features but more atypical   Given family history of early CAD as well as history of hypertension, I am going to arrange a stress echo  Family history of ischemic heart disease  Recent   Unless the stress test is abnormal I would not treat this  Benign essential HTN  On appropriate medication  Blood pressure well controlled  Follow up Plan:  Follow-up only if the stress test is abnormal       HPI:  The patient is seen today in consultation from Dr Elsa Plascencia because of chest pain  Earlier this week the patient had hours of upper left chest discomfort  Intermittently there was an abnormal feeling in the left arm  He was not sure whether there was truly arm discomfort or whether he was just worried about the etiology of the chest pain  In any event symptoms eventually palliated and have not recurred  There is a family history of early CAD in 3 of his parents in their 62s  Even during this symptoms there was no decreased ability to ambulate  There has been no recent change in exertional capacity  There is no history of cigarette smoking  There is no history of syncope or near syncope  Most recent or relevant cardiac/vascular testing:    EKG 11/14/2019:  Sinus rhythm  Minor nonspecific ST segment changes  Past Surgical History:   Procedure Laterality Date    ADENOIDECTOMY      CHOLECYSTECTOMY      FOOT SURGERY Left     KNEE ARTHROPLASTY Right     NASAL SEPTUM SURGERY      ROTATOR CUFF REPAIR Bilateral     TONSILLECTOMY       CMP:   Lab Results   Component Value Date    K 4 0 06/20/2019     06/20/2019    CO2 28 06/20/2019    BUN 12 06/20/2019    CREATININE 0 76 06/20/2019    EGFR 109 06/20/2019       Lipid Profile:   Lab Results   Component Value Date    TRIG 118 06/20/2019    HDL 69 (H) 06/20/2019    LDL in June of 2019 was 102         Review of Systems   10 point ROS  was otherwise non pertinent or negative except as per HPI or as below  Gait: Normal   He admits to a fair amount of chronic anxiety  Objective:     /78   Pulse 90   Ht 5' 10" (1 778 m)   Wt 110 kg (242 lb)   BMI 34 72 kg/m²     PHYSICAL EXAM:    General:  Normal appearance in no distress  Eyes:  Anicteric  Oral mucosa:  Moist   Neck:  No JVD  Carotid upstrokes are brisk without bruits  No masses  Chest:  Clear to auscultation and percussion  Cardiac:  Normal PMI  Normal S1 and S2  No murmur gallop or rub  Abdomen:  Soft and nontender  No palpable organomegaly or aortic enlargement  Extremities:  No peripheral edema  Musculoskeletal:  Symmetric  Vascular:  Femoral pulses are brisk without bruits  Popliteal pulses are intact bilaterally  Pedal pulses are intact  Neuro:  Grossly symmetric  Psych:  Alert and oriented x3          Current Outpatient Medications:     Ascorbic Acid (VITAMIN C) 1000 MG tablet, Take 1,000 mg by mouth daily, Disp: , Rfl:     citalopram (CeleXA) 20 mg tablet, 20 mg daily  , Disp: , Rfl:     EPINEPHrine (EPIPEN) 0 3 mg/0 3 mL SOAJ, Inject 0 3 mL (0 3 mg total) into a muscle once for 1 dose As needed for allergy reaction, Disp: 2 each, Rfl: 1    latanoprost (XALATAN) 0 005 % ophthalmic solution, place 1 drop into both eyes daily, Disp: , Rfl: 1    Levothyroxine Sodium 88 MCG CAPS, 88 mcg daily in the early morning , Disp: , Rfl:     montelukast (SINGULAIR) 10 mg tablet, Take 10 mg by mouth daily at bedtime, Disp: , Rfl:     Multiple Vitamin (MULTIVITAMIN) tablet, Take 1 tablet by mouth daily, Disp: , Rfl:     predniSONE 20 mg tablet, 3 tabs by mouth daily x 3 days, 2 tabs by mouth daily x 3 days, then 1 tab by mouth daily x 3 days (Patient taking differently: as needed 3 tabs by mouth daily x 3 days, 2 tabs by mouth daily x 3 days, then 1 tab by mouth daily x 3 days), Disp: 18 tablet, Rfl: 1    Tuberculin-Allergy Syringes (ALLERGY SYRINGE 1CC/27GX1/2") 27G X 1/2" 1 ML MISC, by Does not apply route once a week 2 allergy injections once per month, Disp: 20 each, Rfl: 6    valsartan-hydrochlorothiazide (DIOVAN-HCT) 80-12 5 MG per tablet, Take 1 tablet by mouth daily, Disp: , Rfl:   Allergies   Allergen Reactions    Penicillins Rash     Past Medical History:   Diagnosis Date    Allergic rhinitis     Anxiety     Disease of thyroid gland     Hypertension            Social History     Tobacco Use   Smoking Status Never Smoker   Smokeless Tobacco Never Used

## 2019-11-21 ENCOUNTER — HOSPITAL ENCOUNTER (OUTPATIENT)
Dept: NON INVASIVE DIAGNOSTICS | Facility: CLINIC | Age: 49
Discharge: HOME | End: 2019-11-21
Payer: COMMERCIAL

## 2019-11-21 DIAGNOSIS — I10 BENIGN ESSENTIAL HTN: ICD-10-CM

## 2019-11-21 DIAGNOSIS — R07.89 OTHER CHEST PAIN: ICD-10-CM

## 2019-11-21 PROCEDURE — 93350 STRESS TTE ONLY: CPT

## 2019-11-22 LAB
ARRHY DURING EX: NORMAL
CHEST PAIN STATEMENT: NORMAL
MAX DIASTOLIC BP: 80 MMHG
MAX HEART RATE: 153 BPM
MAX PREDICTED HEART RATE: 173 BPM
MAX. SYSTOLIC BP: 164 MMHG
PROTOCOL NAME: NORMAL
REASON FOR TERMINATION: NORMAL
TARGET HR FORMULA: NORMAL
TEST INDICATION: NORMAL
TIME IN EXERCISE PHASE: NORMAL

## 2019-11-25 PROCEDURE — 93351 STRESS TTE COMPLETE: CPT | Performed by: INTERNAL MEDICINE

## 2019-12-17 ENCOUNTER — TRANSCRIBE ORDERS (OUTPATIENT)
Dept: PHYSICAL THERAPY | Facility: HOME HEALTHCARE | Age: 49
End: 2019-12-17

## 2019-12-17 ENCOUNTER — EVALUATION (OUTPATIENT)
Dept: OCCUPATIONAL THERAPY | Facility: HOME HEALTHCARE | Age: 49
End: 2019-12-17
Payer: COMMERCIAL

## 2019-12-17 DIAGNOSIS — G56.31 RADIAL NERVE COMPRESSION, RIGHT: Primary | ICD-10-CM

## 2019-12-17 PROCEDURE — 97166 OT EVAL MOD COMPLEX 45 MIN: CPT

## 2019-12-17 PROCEDURE — 97535 SELF CARE MNGMENT TRAINING: CPT

## 2019-12-17 NOTE — PROGRESS NOTES
OT Evaluation     Today's date: 2019  Patient name: Luis Ewing  : 1970  MRN: [id number]  Referring provider: Ligia De Luna MD  Dx:   Encounter Diagnosis     ICD-10-CM    1  Radial nerve compression, right G56 31                   Assessment  Assessment details: Patient presenting to OP OT services with a dx of right radial nerve release  Patient had radial nerve release completed on 2019 by Dr Duglas Huerta  Patient has a follow-up on 2019  Patient reports initially going to Ortho in May with forearm pain  Patient initially thought it was tennis elbow  Patient reports "on and off" pain since  in right arm  Patient had Ulnar Nerve Release on right elbow  Patient reports working for [employer] and types frequently during the day  Patient reports symptoms included decreased strength, sensation deficits and pain with twisting motion  Impairments: abnormal coordination, abnormal or restricted ROM and impaired physical strength  Other impairment: decreased functional use  Barriers to therapy: PMH: HTN, Ulnar Nerve Release  Understanding of Dx/Px/POC: good   Prognosis: good    Goals  STGs    Pt will increase  strength by 5-10#  Pt will increase wrist, forearm and elbow strength by 1/2 grade  Pt will demonstrate decrease in edema by 25%    Independent with HEP      LTGs     Pt will increase  strength by an additional 5-10#  Pt will increase wrist, forearm and elbow strength by 1-2 grade  Pt will demonstrate decrease in edema by 50%    Pt will report an increase in ADL/IADL participation          Plan  Plan details: Patient has presenting to OP OT services with a dx of radial nerve release  Patient demonstrating increased pain, decreased strength, decreased ROM and decreased activity  Pt would benefit from continued Occupational Therapy services one times per week for 2 weeks to return to prior level of function and achieve all established goals   Thank you for the referral!    Patient would benefit from: OT eval and skilled occupational therapy  Referral necessary: Yes  Planned modality interventions: ultrasound, unattended electrical stimulation, thermotherapy: hydrocollator packs, cryotherapy and thermotherapy: paraffin bath  Planned therapy interventions: massage, manual therapy, joint mobilization, patient education, strengthening, stretching, fine motor coordination training, home exercise program, neuromuscular re-education, self care, therapeutic exercise and therapeutic activities  Frequency: 2x week  Duration in visits: 8  Duration in weeks: 4  Treatment plan discussed with: patient        Subjective Evaluation    History of Present Illness  Date of surgery: 2019  Mechanism of injury: surgery  Mechanism of injury: R Radial Nerve Release  Quality of life: good    Pain  Current pain ratin  At best pain ratin  At worst pain ratin  Location: R Forearm  Quality: sharp    Hand dominance: right      Diagnostic Tests  EMG: abnormal  Treatments  Current treatment: occupational therapy  Patient Goals  Patient goals for therapy: decreased pain, increased motion, decreased edema, increased strength, independence with ADLs/IADLs, return to sport/leisure activities and return to work          Objective     Observations     Additional Observation Details  Patient presenting with 7 cm incision along radial aspect of proximal forearm  Patient presenting with steri-stripes and stitches  Neurological Testing     Sensation     Wrist/Hand   Left   Intact: light touch    Right   Diminished: light touch    Additional Neurological Details  Patient reports increased sensation deficits along dorsal aspect of hand prior to surgery  Patient presenting with sensation deficits along incision site       Active Range of Motion     Left Elbow   Normal active range of motion    Right Elbow   Normal active range of motion  Flexion: with pain    Left Wrist   Normal active range of motion    Right Wrist   Normal active range of motion  Wrist flexion: 70 degrees   Wrist extension: 60 degrees   Radial deviation: 28 degrees   Ulnar deviation: 44 degrees     Left Thumb   Opposition: WNL    Right Thumb   Opposition: WNL    Additional Active Range of Motion Details  Full composite fist    Strength/Myotome Testing     Left Elbow   Normal strength    Right Elbow   Flexion: 4  Extension: 4  Forearm supination: 4  Forearm pronation: 4    Left Wrist/Hand      (2nd hand position)     Trial 1: 70    Thumb Strength  Key/Lateral Pinch     Trail 1: 7    Right Wrist/Hand   Wrist extension: 4-  Wrist flexion: 4-  Radial deviation: 4-  Ulnar deviation: 4-     (2nd hand position)     Trial 1: 35    Thumb Strength   Key/Lateral Pinch     Trial 1: 7    Additional Strength Details  Patient presenting with decreased right UE strength  Patient presenting with decreased right wrist and  strength as compared to left  Swelling   Left Elbow Girth Measurements   Joint line: 28 cm    Right Elbow Girth Measurements   Joint line: 29 cm                       Subjective: "I get my stitches out this week "      Objective: See treatment diary below      Assessment: Tolerated treatment well  Patient exhibited good technique with therapeutic exercises and would benefit from continued OT  Patient tolerated session well  Patient and therapist discussed exercises as per initial HEP  Patient verbalized understanding of education and demonstrated proper technique  Therapist will make increases as tolerated  Continue with POC  Patient given a compression sleeve to be worn over steri-stripes to decrease swelling and provide wound coverage  Patient reports comfort of fit with E guaze compression sleeve  Plan: Continue per plan of care  Progress treatment as tolerated            Precautions Radial Nerve Release Protocol       Manual  12/17/2019       Scar Massage                                            Exercise Diary  12/17/2019       Radial Nerve Glide        Wrist Flexor Stretch        Wrist Extensor Stretch        Wrist Iso        Supination Iso        Wrist Flex/Ext        Sup/pronation        Elbow Flex/Ext        Theraputty Grasps        Theraputty Pinches        Theraputty Finger Abduction        Digi-Flex                                                                            Modalities 12/17/2019       Ultrasound        IFC Radial Tunnel w/ MH        CP Performed

## 2019-12-17 NOTE — LETTER
2019    [facility]  [id number]  [id number]    Patient: Rita Adkins   YOB: 1970   Date of Visit: 2019     Encounter Diagnosis     ICD-10-CM    1  Radial nerve compression, right G56 31        Dear Dr Nichol Pollock: Thank you for your recent referral of Rita Adkins  Please review the attached evaluation summary from Evan's recent visit  Please verify that you agree with the plan of care by signing the attached order  If you have any questions or concerns, please do not hesitate to call  I sincerely appreciate the opportunity to share in the care of one of your patients and hope to have another opportunity to work with you in the near future  Sincerely,    Zan Montague, OT      Referring Provider:     I certify that I have read the below Plan of Care and certify the need for these services furnished under this plan of treatment while under my care  Boni Teague MD  [address] [place]: [phone]        OT Evaluation     Today's date: 2019  Patient name: Rita Adkins  : 1970  MRN: [id number]  Referring provider: Tali Arana MD  Dx:   Encounter Diagnosis     ICD-10-CM    1  Radial nerve compression, right G56 31                   Assessment  Assessment details: Patient presenting to OP OT services with a dx of right radial nerve release  Patient had radial nerve release completed on 2019 by Dr Giovana Post  Patient has a follow-up on 2019  Patient reports initially going to Ortho in May with forearm pain  Patient initially thought it was tennis elbow  Patient reports "on and off" pain since  in right arm  Patient had Ulnar Nerve Release on right elbow  Patient reports working for [employer] and types frequently during the day  Patient reports symptoms included decreased strength, sensation deficits and pain with twisting motion     Impairments: abnormal coordination, abnormal or restricted ROM and impaired physical strength  Other impairment: decreased functional use  Barriers to therapy: PMH: HTN, Ulnar Nerve Release  Understanding of Dx/Px/POC: good   Prognosis: good    Goals  STGs    Pt will increase  strength by 5-10#  Pt will increase wrist, forearm and elbow strength by 1/2 grade  Pt will demonstrate decrease in edema by 25%    Independent with HEP      LTGs     Pt will increase  strength by an additional 5-10#  Pt will increase wrist, forearm and elbow strength by 1-2 grade  Pt will demonstrate decrease in edema by 50%    Pt will report an increase in ADL/IADL participation          Plan  Plan details: Patient has presenting to OP OT services with a dx of radial nerve release  Patient demonstrating increased pain, decreased strength, decreased ROM and decreased activity  Pt would benefit from continued Occupational Therapy services one times per week for 2 weeks to return to prior level of function and achieve all established goals   Thank you for the referral!    Patient would benefit from: OT eval and skilled occupational therapy  Referral necessary: Yes  Planned modality interventions: ultrasound, unattended electrical stimulation, thermotherapy: hydrocollator packs, cryotherapy and thermotherapy: paraffin bath  Planned therapy interventions: massage, manual therapy, joint mobilization, patient education, strengthening, stretching, fine motor coordination training, home exercise program, neuromuscular re-education, self care, therapeutic exercise and therapeutic activities  Frequency: 2x week  Duration in visits: 8  Duration in weeks: 4  Treatment plan discussed with: patient        Subjective Evaluation    History of Present Illness  Date of surgery: 2019  Mechanism of injury: surgery  Mechanism of injury: R Radial Nerve Release  Quality of life: good    Pain  Current pain ratin  At best pain ratin  At worst pain rating: 6  Location: R Forearm  Quality: sharp    Hand dominance: right      Diagnostic Tests  EMG: abnormal  Treatments  Current treatment: occupational therapy  Patient Goals  Patient goals for therapy: decreased pain, increased motion, decreased edema, increased strength, independence with ADLs/IADLs, return to sport/leisure activities and return to work          Objective     Observations     Additional Observation Details  Patient presenting with 7 cm incision along radial aspect of proximal forearm  Patient presenting with steri-stripes and stitches  Neurological Testing     Sensation     Wrist/Hand   Left   Intact: light touch    Right   Diminished: light touch    Additional Neurological Details  Patient reports increased sensation deficits along dorsal aspect of hand prior to surgery  Patient presenting with sensation deficits along incision site       Active Range of Motion     Left Elbow   Normal active range of motion    Right Elbow   Normal active range of motion  Flexion: with pain    Left Wrist   Normal active range of motion    Right Wrist   Normal active range of motion  Wrist flexion: 70 degrees   Wrist extension: 60 degrees   Radial deviation: 28 degrees   Ulnar deviation: 44 degrees     Left Thumb   Opposition: WNL    Right Thumb   Opposition: WNL    Additional Active Range of Motion Details  Full composite fist    Strength/Myotome Testing     Left Elbow   Normal strength    Right Elbow   Flexion: 4  Extension: 4  Forearm supination: 4  Forearm pronation: 4    Left Wrist/Hand      (2nd hand position)     Trial 1: 70    Thumb Strength  Key/Lateral Pinch     Trail 1: 7    Right Wrist/Hand   Wrist extension: 4-  Wrist flexion: 4-  Radial deviation: 4-  Ulnar deviation: 4-     (2nd hand position)     Trial 1: 35    Thumb Strength   Key/Lateral Pinch     Trial 1: 7    Additional Strength Details  Patient presenting with decreased right UE strength  Patient presenting with decreased right wrist and  strength as compared to left  Swelling   Left Elbow Girth Measurements   Joint line: 28 cm    Right Elbow Girth Measurements   Joint line: 29 cm                       Subjective: "I get my stitches out this week "      Objective: See treatment diary below      Assessment: Tolerated treatment well  Patient exhibited good technique with therapeutic exercises and would benefit from continued OT  Patient tolerated session well  Patient and therapist discussed exercises as per initial HEP  Patient verbalized understanding of education and demonstrated proper technique  Therapist will make increases as tolerated  Continue with POC  Patient given a compression sleeve to be worn over steri-stripes to decrease swelling and provide wound coverage  Patient reports comfort of fit with E guaze compression sleeve  Plan: Continue per plan of care  Progress treatment as tolerated            Precautions Radial Nerve Release Protocol       Manual  12/17/2019       Scar Massage                                            Exercise Diary  12/17/2019       Radial Nerve Glide        Wrist Flexor Stretch        Wrist Extensor Stretch        Wrist Iso        Supination Iso        Wrist Flex/Ext        Sup/pronation        Elbow Flex/Ext        Theraputty Grasps        Theraputty Pinches        Theraputty Finger Abduction        Digi-Flex                                                                            Modalities 12/17/2019       Ultrasound        IFC Radial Tunnel w/ MH        CP Performed

## 2019-12-17 NOTE — LETTER
2019    Edmundo Fairchild MD  [address] [facility] [address]    Patient: Irene Chambers   YOB: 1970   Date of Visit: 2019     Encounter Diagnosis     ICD-10-CM    1  Radial nerve compression, right G56 31        Dear Dr Lali Wilson: Thank you for your recent referral of Irene Chambers  Please review the attached evaluation summary from Evan's recent visit  Please verify that you agree with the plan of care by signing the attached order  If you have any questions or concerns, please do not hesitate to call  I sincerely appreciate the opportunity to share in the care of one of your patients and hope to have another opportunity to work with you in the near future  Sincerely,    Prince Flynn, OT      Referring Provider:     I certify that I have read the below Plan of Care and certify the need for these services furnished under this plan of treatment while under my care  Edmundo Fairchild MD  [address] [facility] [address] [facility]: [phone]        OT Evaluation     Today's date: 2019  Patient name: Irene Chambers  : 1970  MRN: [id number]  Referring provider: Laurent Knutson MD  Dx:   Encounter Diagnosis     ICD-10-CM    1  Radial nerve compression, right G56 31                   Assessment  Assessment details: Patient presenting to OP OT services with a dx of right radial nerve release  Patient had radial nerve release completed on 2019 by Dr Lali Wilson  Patient has a follow-up on 2019  Patient reports initially going to Ortho in May with forearm pain  Patient initially thought it was tennis elbow  Patient reports "on and off" pain since  in right arm  Patient had Ulnar Nerve Release on right elbow  Patient reports working for [employer] and types frequently during the day   Patient reports symptoms included decreased strength, sensation deficits and pain with twisting motion  Impairments: abnormal coordination, abnormal or restricted ROM and impaired physical strength  Other impairment: decreased functional use  Barriers to therapy: PMH: HTN, Ulnar Nerve Release  Understanding of Dx/Px/POC: good   Prognosis: good    Goals  STGs    Pt will increase  strength by 5-10#  Pt will increase wrist, forearm and elbow strength by 1/2 grade  Pt will demonstrate decrease in edema by 25%    Independent with HEP      LTGs     Pt will increase  strength by an additional 5-10#  Pt will increase wrist, forearm and elbow strength by 1-2 grade  Pt will demonstrate decrease in edema by 50%    Pt will report an increase in ADL/IADL participation          Plan  Plan details: Patient has presenting to OP OT services with a dx of radial nerve release  Patient demonstrating increased pain, decreased strength, decreased ROM and decreased activity  Pt would benefit from continued Occupational Therapy services one times per week for 2 weeks to return to prior level of function and achieve all established goals   Thank you for the referral!    Patient would benefit from: OT eval and skilled occupational therapy  Referral necessary: Yes  Planned modality interventions: ultrasound, unattended electrical stimulation, thermotherapy: hydrocollator packs, cryotherapy and thermotherapy: paraffin bath  Planned therapy interventions: massage, manual therapy, joint mobilization, patient education, strengthening, stretching, fine motor coordination training, home exercise program, neuromuscular re-education, self care, therapeutic exercise and therapeutic activities  Frequency: 2x week  Duration in visits: 8  Duration in weeks: 4  Treatment plan discussed with: patient        Subjective Evaluation    History of Present Illness  Date of surgery: 2019  Mechanism of injury: surgery  Mechanism of injury: R Radial Nerve Release  Quality of life: good    Pain  Current pain ratin  At best pain ratin  At worst pain ratin  Location: R Forearm  Quality: sharp    Hand dominance: right      Diagnostic Tests  EMG: abnormal  Treatments  Current treatment: occupational therapy  Patient Goals  Patient goals for therapy: decreased pain, increased motion, decreased edema, increased strength, independence with ADLs/IADLs, return to sport/leisure activities and return to work          Objective     Observations     Additional Observation Details  Patient presenting with 7 cm incision along radial aspect of proximal forearm  Patient presenting with steri-stripes and stitches  Neurological Testing     Sensation     Wrist/Hand   Left   Intact: light touch    Right   Diminished: light touch    Additional Neurological Details  Patient reports increased sensation deficits along dorsal aspect of hand prior to surgery  Patient presenting with sensation deficits along incision site       Active Range of Motion     Left Elbow   Normal active range of motion    Right Elbow   Normal active range of motion  Flexion: with pain    Left Wrist   Normal active range of motion    Right Wrist   Normal active range of motion  Wrist flexion: 70 degrees   Wrist extension: 60 degrees   Radial deviation: 28 degrees   Ulnar deviation: 44 degrees     Left Thumb   Opposition: WNL    Right Thumb   Opposition: WNL    Additional Active Range of Motion Details  Full composite fist    Strength/Myotome Testing     Left Elbow   Normal strength    Right Elbow   Flexion: 4  Extension: 4  Forearm supination: 4  Forearm pronation: 4    Left Wrist/Hand      (2nd hand position)     Trial 1: 70    Thumb Strength  Key/Lateral Pinch     Trail 1: 7    Right Wrist/Hand   Wrist extension: 4-  Wrist flexion: 4-  Radial deviation: 4-  Ulnar deviation: 4-     (2nd hand position)     Trial 1: 35    Thumb Strength   Key/Lateral Pinch     Trial 1: 7    Additional Strength Details  Patient presenting with decreased right UE strength  Patient presenting with decreased right wrist and  strength as compared to left  Swelling   Left Elbow Girth Measurements   Joint line: 28 cm    Right Elbow Girth Measurements   Joint line: 29 cm                       Subjective: "I get my stitches out this week "      Objective: See treatment diary below      Assessment: Tolerated treatment well  Patient exhibited good technique with therapeutic exercises and would benefit from continued OT  Patient tolerated session well  Patient and therapist discussed exercises as per initial HEP  Patient verbalized understanding of education and demonstrated proper technique  Therapist will make increases as tolerated  Continue with POC  Patient given a compression sleeve to be worn over steri-stripes to decrease swelling and provide wound coverage  Patient reports comfort of fit with E guaze compression sleeve  Plan: Continue per plan of care  Progress treatment as tolerated            Precautions Radial Nerve Release Protocol       Manual  12/17/2019       Scar Massage                                            Exercise Diary  12/17/2019       Radial Nerve Glide        Wrist Flexor Stretch        Wrist Extensor Stretch        Wrist Iso        Supination Iso        Wrist Flex/Ext        Sup/pronation        Elbow Flex/Ext        Theraputty Grasps        Theraputty Pinches        Theraputty Finger Abduction        Digi-Flex                                                                            Modalities 12/17/2019       Ultrasound        IFC Radial Tunnel w/ MH        CP Performed

## 2019-12-26 ENCOUNTER — OFFICE VISIT (OUTPATIENT)
Dept: OCCUPATIONAL THERAPY | Facility: HOME HEALTHCARE | Age: 49
End: 2019-12-26
Payer: COMMERCIAL

## 2019-12-26 DIAGNOSIS — G56.31 RADIAL NERVE COMPRESSION, RIGHT: Primary | ICD-10-CM

## 2019-12-26 PROCEDURE — 97035 APP MDLTY 1+ULTRASOUND EA 15: CPT

## 2019-12-26 PROCEDURE — 97110 THERAPEUTIC EXERCISES: CPT

## 2019-12-26 PROCEDURE — 97014 ELECTRIC STIMULATION THERAPY: CPT

## 2019-12-26 PROCEDURE — 97140 MANUAL THERAPY 1/> REGIONS: CPT

## 2019-12-26 PROCEDURE — G0283 ELEC STIM OTHER THAN WOUND: HCPCS

## 2019-12-26 NOTE — PROGRESS NOTES
Daily Note     Today's date: 2019  Patient name: Irene Chambers  : 1970  MRN: [id number]  Referring provider: Laurent Knutson MD  Dx:   Encounter Diagnosis     ICD-10-CM    1  Radial nerve compression, right G56 31                   Subjective: "It looks good "      Objective: See treatment diary below      Assessment: Tolerated treatment well  Patient exhibited good technique with therapeutic exercises and would benefit from continued OT  Therapist removed Steri-stripes this session with routine healing noted  Patient had stitches removed last Friday  Patient given silicone gel pad to be worn over incision site to decrease scar adhesions  Plan: Continue per plan of care  Progress treatment as tolerated            Precautions Radial Nerve Release Protocol       Manual  2019      Scar Massage  10 Min                                          Exercise Diary  2019      Radial Nerve Glide  X 10      Wrist Flexor Stretch  30 sec x 3      Wrist Extensor Stretch  30 sec x 3      Wrist Iso  5 sec x 5      Supination Iso  5 sec x 5      Wrist Flex/Ext        Sup/pronation        Elbow Flex/Ext Iso   5 sec x 5      Theraputty Grasps  Yellow x 20      Theraputty Pinches  Yellow x 20      Theraputty Finger Abduction  Yellow x 20      Digi-Flex  Blue 2 x 10                                                                          Modalities 2019      Ultrasound  Performed      IFC Radial Tunnel w/ MH  Performed      CP Performed

## 2019-12-31 ENCOUNTER — OFFICE VISIT (OUTPATIENT)
Dept: OCCUPATIONAL THERAPY | Facility: HOME HEALTHCARE | Age: 49
End: 2019-12-31
Payer: COMMERCIAL

## 2019-12-31 DIAGNOSIS — G56.31 RADIAL NERVE COMPRESSION, RIGHT: Primary | ICD-10-CM

## 2019-12-31 PROCEDURE — G0283 ELEC STIM OTHER THAN WOUND: HCPCS

## 2019-12-31 PROCEDURE — 97035 APP MDLTY 1+ULTRASOUND EA 15: CPT

## 2019-12-31 PROCEDURE — 97110 THERAPEUTIC EXERCISES: CPT

## 2019-12-31 PROCEDURE — 97014 ELECTRIC STIMULATION THERAPY: CPT

## 2019-12-31 NOTE — PROGRESS NOTES
Daily Note     Today's date: 2019  Patient name: Arie Maldonado  : 1970  MRN: [id number]  Referring provider: Melvin Cortez MD  Dx:   Encounter Diagnosis     ICD-10-CM    1  Radial nerve compression, right G56 31                   Subjective: Its actually been feeling pretty good  Objective: See treatment diary below      Assessment: Tolerated treatment well  Patient exhibited good technique with therapeutic exercises and would benefit from continued OT Therapist completed Ultrasound this date with parameters set at 3 3 MHz, 100% rate, and 1 0 intensity  E-Stim with heat applied to decrease symptoms  Pt states his arm is actually feeling pretty good and has been doing exercises at home with no questions at this time  Plan: Continue per plan of care  Progress treatment as tolerated  Progress treament per protocol           Precautions Radial Nerve Release Protocol       Manual  2019     Scar Massage  10 Min                                          Exercise Diary  2019     Radial Nerve Glide  X 10 X 30     Wrist Flexor Stretch  30 sec x 3 5 sec x 30     Wrist Extensor Stretch  30 sec x 3 5 sec x 30     Wrist Iso  5 sec x 5 5 sec x 15     Supination Iso  5 sec x 5 5 sec x 15     Wrist Flex/Ext   2 x 20     Sup/pronation   2 x 20     Elbow Flex/Ext Iso   5 sec x 5 5 sec x 10     Theraputty Grasps  Yellow x 20 Yellow x 20     Theraputty Pinches  Yellow x 20 Yellow x 20     Theraputty Finger Abduction  Yellow x 20 Yellow x 20     Digi-Flex  Blue 2 x 10 Blue 2 x 20                                                                         Modalities 2019     Ultrasound  Performed Performed     IFC Radial Tunnel w/ MH  Performed Performed     CP Performed

## 2020-01-06 ENCOUNTER — OFFICE VISIT (OUTPATIENT)
Dept: OCCUPATIONAL THERAPY | Facility: HOME HEALTHCARE | Age: 50
End: 2020-01-06
Payer: COMMERCIAL

## 2020-01-06 DIAGNOSIS — G56.31 RADIAL NERVE COMPRESSION, RIGHT: Primary | ICD-10-CM

## 2020-01-06 PROCEDURE — G0283 ELEC STIM OTHER THAN WOUND: HCPCS

## 2020-01-06 PROCEDURE — 97035 APP MDLTY 1+ULTRASOUND EA 15: CPT

## 2020-01-06 PROCEDURE — 97014 ELECTRIC STIMULATION THERAPY: CPT

## 2020-01-06 PROCEDURE — 97110 THERAPEUTIC EXERCISES: CPT

## 2020-01-06 NOTE — PROGRESS NOTES
Daily Note     Today's date: 2020  Patient name: Peter Kee  : 1970  MRN: [id number]  Referring provider: Warren Carnes MD  Dx:   Encounter Diagnosis     ICD-10-CM    1  Radial nerve compression, right G56 31                   Subjective: I actually havent had any pain in the last couple days  Objective: See treatment diary below      Assessment: Tolerated treatment well  Patient exhibited good technique with therapeutic exercises and would benefit from continued OT Therapist adding FW to ROM exercises this date in addition to several other new activities with gripping  Pt reporting no increased pain or discomfort, however, fatigue noted with gripping  Plan: Continue per plan of care  Progress treatment as tolerated  Progress treament per protocol           Precautions Radial Nerve Release Protocol       Manual  2019    Scar Massage  10 Min                                          Exercise Diary  2019    Radial Nerve Glide  X 10 X 30 X 30    Wrist Flexor Stretch  30 sec x 3 5 sec x 30 30 sec x 3    Wrist Extensor Stretch  30 sec x 3 5 sec x 30 30 sec x 3    Wrist Iso  5 sec x 5 5 sec x 15     Supination Iso  5 sec x 5 5 sec x 15     Wrist Flex/Ext   2 x 20 2# 2 x 20 each    Sup/pronation   2 x 20 2# 2 x 20    Elbow Flex/Ext Iso   5 sec x 5 5 sec x 10 2# 2 x 20    Theraputty Grasps  Yellow x 20 Yellow x 20 Red x 20    Theraputty Pinches  Yellow x 20 Yellow x 20 Red x 30    Theraputty Finger Abduction  Yellow x 20 Yellow x 20 Red x 10    Digi-Flex  Blue 2 x 10 Blue 2 x 20 Black 2 x 20    X-Trainer    Green 2 x 10    FlexBar pro/sup/flex/ext    Green x 20 each way    Gripper Pegs    All in/out hardest gripper level    UBE Machine    5 min    Wrist Maze    X 2    Tension Pins    Bl,G,B on/off                        Modalities 2019    Ultrasound  Performed Performed Performed    IFC Radial Tunnel w/ MH  Performed Performed Performed    CP Performed

## 2020-01-09 ENCOUNTER — OFFICE VISIT (OUTPATIENT)
Dept: OCCUPATIONAL THERAPY | Facility: HOME HEALTHCARE | Age: 50
End: 2020-01-09
Payer: COMMERCIAL

## 2020-01-09 DIAGNOSIS — G56.31 RADIAL NERVE COMPRESSION, RIGHT: Primary | ICD-10-CM

## 2020-01-09 PROCEDURE — 97035 APP MDLTY 1+ULTRASOUND EA 15: CPT

## 2020-01-09 PROCEDURE — 97110 THERAPEUTIC EXERCISES: CPT

## 2020-01-09 PROCEDURE — 97112 NEUROMUSCULAR REEDUCATION: CPT

## 2020-05-29 ENCOUNTER — HOSPITAL ENCOUNTER (EMERGENCY)
Facility: HOSPITAL | Age: 50
Discharge: HOME | End: 2020-05-29
Attending: EMERGENCY MEDICINE | Admitting: EMERGENCY MEDICINE
Payer: OTHER MISCELLANEOUS

## 2020-05-29 ENCOUNTER — APPOINTMENT (EMERGENCY)
Dept: RADIOLOGY | Facility: HOSPITAL | Age: 50
End: 2020-05-29
Payer: OTHER MISCELLANEOUS

## 2020-05-29 VITALS
RESPIRATION RATE: 16 BRPM | WEIGHT: 249.56 LBS | OXYGEN SATURATION: 96 % | SYSTOLIC BLOOD PRESSURE: 151 MMHG | DIASTOLIC BLOOD PRESSURE: 85 MMHG | BODY MASS INDEX: 35.73 KG/M2 | TEMPERATURE: 97.4 F | HEIGHT: 70 IN | HEART RATE: 89 BPM

## 2020-05-29 DIAGNOSIS — S83.91XA RIGHT KNEE SPRAIN: Primary | ICD-10-CM
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
## 2020-05-29 PROCEDURE — 99284 EMERGENCY DEPT VISIT MOD MDM: CPT | Performed by: EMERGENCY MEDICINE

## 2020-05-29 PROCEDURE — 99283 EMERGENCY DEPT VISIT LOW MDM: CPT

## 2020-05-29 PROCEDURE — 73564 X-RAY EXAM KNEE 4 OR MORE: CPT

## 2020-05-29 RX ORDER — IBUPROFEN 800 MG/1
800 TABLET ORAL ONCE
Status: COMPLETED | OUTPATIENT
Start: 2020-05-29 | End: 2020-05-29

## 2020-05-29 RX ORDER — NAPROXEN 375 MG/1
375 TABLET ORAL 2 TIMES DAILY PRN
Qty: 20 TABLET | Refills: 0 | Status: SHIPPED | OUTPATIENT
Start: 2020-05-29

## 2020-05-29 RX ADMIN — IBUPROFEN 800 MG: 800 TABLET ORAL at 22:44

## 2020-06-01 ENCOUNTER — APPOINTMENT (OUTPATIENT)
Dept: URGENT CARE | Facility: CLINIC | Age: 50
End: 2020-06-01
Payer: OTHER MISCELLANEOUS

## 2020-06-01 PROCEDURE — G0382 LEV 3 HOSP TYPE B ED VISIT: HCPCS

## 2020-06-01 PROCEDURE — 99283 EMERGENCY DEPT VISIT LOW MDM: CPT

## 2020-06-08 ENCOUNTER — APPOINTMENT (OUTPATIENT)
Dept: URGENT CARE | Facility: CLINIC | Age: 50
End: 2020-06-08
Payer: OTHER MISCELLANEOUS

## 2020-06-08 PROCEDURE — 99213 OFFICE O/P EST LOW 20 MIN: CPT

## 2020-06-16 ENCOUNTER — APPOINTMENT (OUTPATIENT)
Dept: URGENT CARE | Facility: CLINIC | Age: 50
End: 2020-06-16
Payer: OTHER MISCELLANEOUS

## 2020-06-16 PROCEDURE — 99213 OFFICE O/P EST LOW 20 MIN: CPT

## 2020-06-23 ENCOUNTER — TRANSCRIBE ORDERS (OUTPATIENT)
Dept: LAB | Facility: CLINIC | Age: 50
End: 2020-06-23

## 2020-06-23 ENCOUNTER — APPOINTMENT (OUTPATIENT)
Dept: LAB | Facility: CLINIC | Age: 50
End: 2020-06-23
Payer: COMMERCIAL

## 2020-06-23 DIAGNOSIS — E78.5 HYPERLIPIDEMIA, UNSPECIFIED HYPERLIPIDEMIA TYPE: ICD-10-CM

## 2020-06-23 DIAGNOSIS — I10 HYPERTENSION, UNSPECIFIED TYPE: Primary | ICD-10-CM

## 2020-06-23 DIAGNOSIS — E03.9 HYPOTHYROIDISM, UNSPECIFIED TYPE: ICD-10-CM

## 2020-06-23 DIAGNOSIS — I10 HYPERTENSION, UNSPECIFIED TYPE: ICD-10-CM

## 2020-06-23 LAB
25(OH)D3 SERPL-MCNC: 17.4 NG/ML (ref 30–100)
ALBUMIN SERPL BCP-MCNC: 4 G/DL (ref 3.5–5)
ALP SERPL-CCNC: 75 U/L (ref 46–116)
ALT SERPL W P-5'-P-CCNC: 65 U/L (ref 12–78)
ANION GAP SERPL CALCULATED.3IONS-SCNC: 7 MMOL/L (ref 4–13)
AST SERPL W P-5'-P-CCNC: 34 U/L (ref 5–45)
BACTERIA UR QL AUTO: ABNORMAL /HPF
BASOPHILS # BLD AUTO: 0.08 THOUSANDS/ΜL (ref 0–0.1)
BASOPHILS NFR BLD AUTO: 1 % (ref 0–1)
BILIRUB SERPL-MCNC: 0.67 MG/DL (ref 0.2–1)
BILIRUB UR QL STRIP: NEGATIVE
BUN SERPL-MCNC: 14 MG/DL (ref 5–25)
CALCIUM SERPL-MCNC: 9.2 MG/DL (ref 8.3–10.1)
CHLORIDE SERPL-SCNC: 103 MMOL/L (ref 100–108)
CHOLEST SERPL-MCNC: 224 MG/DL (ref 50–200)
CLARITY UR: CLEAR
CO2 SERPL-SCNC: 28 MMOL/L (ref 21–32)
COLOR UR: YELLOW
CREAT SERPL-MCNC: 0.88 MG/DL (ref 0.6–1.3)
EOSINOPHIL # BLD AUTO: 0.12 THOUSAND/ΜL (ref 0–0.61)
EOSINOPHIL NFR BLD AUTO: 2 % (ref 0–6)
ERYTHROCYTE [DISTWIDTH] IN BLOOD BY AUTOMATED COUNT: 13.7 % (ref 11.6–15.1)
GFR SERPL CREATININE-BSD FRML MDRD: 102 ML/MIN/1.73SQ M
GLUCOSE P FAST SERPL-MCNC: 93 MG/DL (ref 65–99)
GLUCOSE UR STRIP-MCNC: NEGATIVE MG/DL
HCT VFR BLD AUTO: 42.5 % (ref 36.5–49.3)
HDLC SERPL-MCNC: 89 MG/DL
HGB BLD-MCNC: 13.8 G/DL (ref 12–17)
HGB UR QL STRIP.AUTO: NEGATIVE
HYALINE CASTS #/AREA URNS LPF: ABNORMAL /LPF
IMM GRANULOCYTES # BLD AUTO: 0.01 THOUSAND/UL (ref 0–0.2)
IMM GRANULOCYTES NFR BLD AUTO: 0 % (ref 0–2)
IRON SERPL-MCNC: 138 UG/DL (ref 65–175)
KETONES UR STRIP-MCNC: ABNORMAL MG/DL
LDLC SERPL CALC-MCNC: 103 MG/DL (ref 0–100)
LEUKOCYTE ESTERASE UR QL STRIP: ABNORMAL
LYMPHOCYTES # BLD AUTO: 2.41 THOUSANDS/ΜL (ref 0.6–4.47)
LYMPHOCYTES NFR BLD AUTO: 33 % (ref 14–44)
MCH RBC QN AUTO: 30.3 PG (ref 26.8–34.3)
MCHC RBC AUTO-ENTMCNC: 32.5 G/DL (ref 31.4–37.4)
MCV RBC AUTO: 93 FL (ref 82–98)
MONOCYTES # BLD AUTO: 0.91 THOUSAND/ΜL (ref 0.17–1.22)
MONOCYTES NFR BLD AUTO: 13 % (ref 4–12)
NEUTROPHILS # BLD AUTO: 3.73 THOUSANDS/ΜL (ref 1.85–7.62)
NEUTS SEG NFR BLD AUTO: 51 % (ref 43–75)
NITRITE UR QL STRIP: NEGATIVE
NON-SQ EPI CELLS URNS QL MICRO: ABNORMAL /HPF
NONHDLC SERPL-MCNC: 135 MG/DL
NRBC BLD AUTO-RTO: 0 /100 WBCS
PH UR STRIP.AUTO: 6 [PH]
PLATELET # BLD AUTO: 366 THOUSANDS/UL (ref 149–390)
PMV BLD AUTO: 10 FL (ref 8.9–12.7)
POTASSIUM SERPL-SCNC: 4.4 MMOL/L (ref 3.5–5.3)
PROT SERPL-MCNC: 7.1 G/DL (ref 6.4–8.2)
PROT UR STRIP-MCNC: NEGATIVE MG/DL
RBC # BLD AUTO: 4.55 MILLION/UL (ref 3.88–5.62)
RBC #/AREA URNS AUTO: ABNORMAL /HPF
SODIUM SERPL-SCNC: 138 MMOL/L (ref 136–145)
SP GR UR STRIP.AUTO: 1.02 (ref 1–1.03)
T4 FREE SERPL-MCNC: 1 NG/DL (ref 0.76–1.46)
TRIGL SERPL-MCNC: 162 MG/DL
TSH SERPL DL<=0.05 MIU/L-ACNC: 7.08 UIU/ML (ref 0.36–3.74)
UROBILINOGEN UR QL STRIP.AUTO: 0.2 E.U./DL
VIT B12 SERPL-MCNC: 939 PG/ML (ref 100–900)
WBC # BLD AUTO: 7.26 THOUSAND/UL (ref 4.31–10.16)
WBC #/AREA URNS AUTO: ABNORMAL /HPF

## 2020-06-23 PROCEDURE — 84439 ASSAY OF FREE THYROXINE: CPT

## 2020-06-23 PROCEDURE — 85025 COMPLETE CBC W/AUTO DIFF WBC: CPT

## 2020-06-23 PROCEDURE — 82306 VITAMIN D 25 HYDROXY: CPT

## 2020-06-23 PROCEDURE — 81001 URINALYSIS AUTO W/SCOPE: CPT

## 2020-06-23 PROCEDURE — 80053 COMPREHEN METABOLIC PANEL: CPT

## 2020-06-23 PROCEDURE — 36415 COLL VENOUS BLD VENIPUNCTURE: CPT

## 2020-06-23 PROCEDURE — 82607 VITAMIN B-12: CPT

## 2020-06-23 PROCEDURE — 83540 ASSAY OF IRON: CPT

## 2020-06-23 PROCEDURE — 84443 ASSAY THYROID STIM HORMONE: CPT

## 2020-06-23 PROCEDURE — 80061 LIPID PANEL: CPT

## 2020-10-14 ENCOUNTER — TRANSCRIBE ORDERS (OUTPATIENT)
Dept: PHYSICAL THERAPY | Facility: HOME HEALTHCARE | Age: 50
End: 2020-10-14

## 2020-10-14 ENCOUNTER — EVALUATION (OUTPATIENT)
Dept: PHYSICAL THERAPY | Facility: HOME HEALTHCARE | Age: 50
End: 2020-10-14
Payer: COMMERCIAL

## 2020-10-14 DIAGNOSIS — M75.51 BURSITIS OF RIGHT SHOULDER: Primary | ICD-10-CM

## 2020-10-14 PROCEDURE — 97535 SELF CARE MNGMENT TRAINING: CPT | Performed by: PHYSICAL THERAPIST

## 2020-10-14 PROCEDURE — 97110 THERAPEUTIC EXERCISES: CPT | Performed by: PHYSICAL THERAPIST

## 2020-10-14 PROCEDURE — 97161 PT EVAL LOW COMPLEX 20 MIN: CPT | Performed by: PHYSICAL THERAPIST

## 2020-10-19 ENCOUNTER — OFFICE VISIT (OUTPATIENT)
Dept: PHYSICAL THERAPY | Facility: HOME HEALTHCARE | Age: 50
End: 2020-10-19
Payer: COMMERCIAL

## 2020-10-19 DIAGNOSIS — M75.51 BURSITIS OF RIGHT SHOULDER: Primary | ICD-10-CM

## 2020-10-19 PROCEDURE — 97140 MANUAL THERAPY 1/> REGIONS: CPT

## 2020-10-19 PROCEDURE — 97110 THERAPEUTIC EXERCISES: CPT

## 2020-10-19 PROCEDURE — 97035 APP MDLTY 1+ULTRASOUND EA 15: CPT

## 2020-10-22 ENCOUNTER — OFFICE VISIT (OUTPATIENT)
Dept: PHYSICAL THERAPY | Facility: HOME HEALTHCARE | Age: 50
End: 2020-10-22
Payer: COMMERCIAL

## 2020-10-22 DIAGNOSIS — M75.51 BURSITIS OF RIGHT SHOULDER: Primary | ICD-10-CM

## 2020-10-22 DIAGNOSIS — M25.531 RIGHT WRIST PAIN: ICD-10-CM

## 2020-10-22 PROCEDURE — 97110 THERAPEUTIC EXERCISES: CPT

## 2020-10-22 PROCEDURE — 97035 APP MDLTY 1+ULTRASOUND EA 15: CPT

## 2020-10-22 PROCEDURE — 97140 MANUAL THERAPY 1/> REGIONS: CPT

## 2020-10-27 ENCOUNTER — OFFICE VISIT (OUTPATIENT)
Dept: PHYSICAL THERAPY | Facility: HOME HEALTHCARE | Age: 50
End: 2020-10-27
Payer: COMMERCIAL

## 2020-10-27 DIAGNOSIS — M75.51 BURSITIS OF RIGHT SHOULDER: Primary | ICD-10-CM

## 2020-10-27 PROCEDURE — 97035 APP MDLTY 1+ULTRASOUND EA 15: CPT

## 2020-10-27 PROCEDURE — 97110 THERAPEUTIC EXERCISES: CPT

## 2020-10-27 PROCEDURE — 97140 MANUAL THERAPY 1/> REGIONS: CPT

## 2020-10-29 ENCOUNTER — OFFICE VISIT (OUTPATIENT)
Dept: PHYSICAL THERAPY | Facility: HOME HEALTHCARE | Age: 50
End: 2020-10-29
Payer: COMMERCIAL

## 2020-10-29 DIAGNOSIS — M75.51 BURSITIS OF RIGHT SHOULDER: Primary | ICD-10-CM

## 2020-10-29 DIAGNOSIS — M25.531 RIGHT WRIST PAIN: ICD-10-CM

## 2020-10-29 PROCEDURE — 97140 MANUAL THERAPY 1/> REGIONS: CPT

## 2020-10-29 PROCEDURE — 97035 APP MDLTY 1+ULTRASOUND EA 15: CPT

## 2020-10-29 PROCEDURE — 97110 THERAPEUTIC EXERCISES: CPT

## 2020-11-03 ENCOUNTER — APPOINTMENT (OUTPATIENT)
Dept: PHYSICAL THERAPY | Facility: HOME HEALTHCARE | Age: 50
End: 2020-11-03
Payer: COMMERCIAL

## 2020-11-04 ENCOUNTER — OFFICE VISIT (OUTPATIENT)
Dept: PHYSICAL THERAPY | Facility: HOME HEALTHCARE | Age: 50
End: 2020-11-04
Payer: COMMERCIAL

## 2020-11-04 DIAGNOSIS — M75.51 BURSITIS OF RIGHT SHOULDER: Primary | ICD-10-CM

## 2020-11-04 DIAGNOSIS — M25.531 RIGHT WRIST PAIN: ICD-10-CM

## 2020-11-04 PROCEDURE — 97140 MANUAL THERAPY 1/> REGIONS: CPT

## 2020-11-04 PROCEDURE — 97110 THERAPEUTIC EXERCISES: CPT

## 2020-11-04 PROCEDURE — 97035 APP MDLTY 1+ULTRASOUND EA 15: CPT

## 2020-11-05 ENCOUNTER — TRANSCRIBE ORDERS (OUTPATIENT)
Dept: ADMINISTRATIVE | Facility: HOSPITAL | Age: 50
End: 2020-11-05

## 2020-11-05 ENCOUNTER — APPOINTMENT (OUTPATIENT)
Dept: PHYSICAL THERAPY | Facility: HOME HEALTHCARE | Age: 50
End: 2020-11-05
Payer: COMMERCIAL

## 2020-11-05 DIAGNOSIS — S46.091A OTHER INJURY OF MUSCLE(S) AND TENDON(S) OF THE ROTATOR CUFF OF RIGHT SHOULDER, INITIAL ENCOUNTER: Primary | ICD-10-CM

## 2020-11-06 ENCOUNTER — APPOINTMENT (OUTPATIENT)
Dept: PHYSICAL THERAPY | Facility: HOME HEALTHCARE | Age: 50
End: 2020-11-06
Payer: COMMERCIAL

## 2020-11-10 ENCOUNTER — APPOINTMENT (OUTPATIENT)
Dept: PHYSICAL THERAPY | Facility: HOME HEALTHCARE | Age: 50
End: 2020-11-10
Payer: COMMERCIAL

## 2020-11-12 ENCOUNTER — HOSPITAL ENCOUNTER (OUTPATIENT)
Dept: MRI IMAGING | Facility: HOSPITAL | Age: 50
Discharge: HOME | End: 2020-11-12
Payer: COMMERCIAL

## 2020-11-12 ENCOUNTER — APPOINTMENT (OUTPATIENT)
Dept: PHYSICAL THERAPY | Facility: HOME HEALTHCARE | Age: 50
End: 2020-11-12
Payer: COMMERCIAL

## 2020-11-12 DIAGNOSIS — S46.091A OTHER INJURY OF MUSCLE(S) AND TENDON(S) OF THE ROTATOR CUFF OF RIGHT SHOULDER, INITIAL ENCOUNTER: ICD-10-CM

## 2020-11-12 PROCEDURE — 73221 MRI JOINT UPR EXTREM W/O DYE: CPT

## 2020-11-12 PROCEDURE — G1004 CDSM NDSC: HCPCS

## 2020-12-07 NOTE — PROGRESS NOTES
Impression: Type 2 diabetes mellitus without complications: B43.6. Plan: No retinopathy found on today's examination. Discussed importance of blood sugar, blood pressure and cholesterol control. Letter with today's examination results sent to managing provider.  RTC 1 year for [facility][note 3] SERVICES [facility] PT Evaluation     Today's date: 2020  Patient name: Christine Ivy  : 1970  MRN: [id number]  Referring provider: Alf Peoples MD  Dx:   Encounter Diagnosis     ICD-10-CM    1  Bursitis of right shoulder  M75 51                   Assessment  Assessment details: The patient is a 51 y/o male who presents to PT with diagnosis of R shoulder bursitis  He has complaints of pain with most pain being   He also demonstrates deficits with decreased ROM and strength, decreased flexibility, decreased postural awareness, difficulty sleeping and pain with completing his ADLs  He remains I with all his ADLs though he has pain with completing them  The patient would benefit from continued PT to address deficits and improve function  Tx to include ROM, stretching, strengthening, modalities, HEP, pt education, postural ed, lifting/body mechanics, neuro re-ed, balance/proprioception Te, MT and equipment  Impairments: abnormal or restricted ROM, activity intolerance, impaired physical strength, lacks appropriate home exercise program, pain with function, poor posture  and poor body mechanics  Other impairment: decreased flexibility  Understanding of Dx/Px/POC: good   Prognosis: good    Goals  STGs:  1  Initiate and complete HEP with verbal cues  2   Decrease R shoulder pain by 25% in 4 weeks  3   Improve R shoulder strength by 1/2 grade in 4 weeks  4   Improve R shoulder ROM by 5-10 degrees in 4 weeks  LTGs:  1  Patient to be I with HEP in 12 weeks  2   Improve R shoulder ROM to WNL t/o in 12 weeks to improve function  3   Improve RUE strength to > or = to /5 t/o in 12 weeks to improve function  4   Decrease R shoulder pain to < or = to /10 with activity in 12 weeks to improve function  5   Recreational performance in related activities is improved to PLOF in 12 weeks  6   Postural control is improved to maximal level of function in 12 weeks    7   Work performance is improved to PLOF in 12 weeks   8   ADL performance is improved to PLOF in 12 weeks  Plan  Plan details: Modalities and therapy interventions prn  Patient would benefit from: skilled physical therapy  Planned modality interventions: cryotherapy, thermotherapy: hydrocollator packs, unattended electrical stimulation and ultrasound  Planned therapy interventions: manual therapy, body mechanics training, behavior modification, neuromuscular re-education, patient education, postural training, self care, strengthening, stretching, therapeutic activities, therapeutic exercise, flexibility and home exercise program  Frequency: 2x week  Duration in weeks: 12  Plan of Care beginning date: 12/17/2020  Plan of Care expiration date: 3/17/2021  Treatment plan discussed with: patient        Subjective Evaluation    History of Present Illness  Mechanism of injury: The patient states that around July he developed R shoulder pain with no known cause  He notes that it was sore and stiff in the morning but the pain had been getting progressively worse with pain waking him up at night  He had gone to see Dr Omar Kern in October  He had x-rays completed, per patient they were negative  He was given a cortisone shot which did help  He was also prescribed medicine which has been helping  He was also referred to OPPT and he was previously seen in PT from 10/14/20 to 11/4/20 for a total of 6 visits  He had gone back to the doctor      Pain  Location: R Shoulder    Patient Goals  Patient goals for therapy: decreased pain, increased motion and increased strength          Objective           Precautions: None  Re-Eval DUE: 1/17/21     Manuals 12/17/21       R Shoulder  NV                     Neuro Re-Ed             MTP/LTP        Ball Roll on Wall                 Ther Ex 12/17/21       UBE Alt        Shrugs/Rolls        Retractions        Isometrics        Pulleys        Finger Ladder        Wall Pushups        Stand FF & Abd        Wall Slides Raúl TE Flex/Abd        Supine Punch        S/L ER & Abd        Prone T & Vs        Prone Flex/Ext/Rows        TBand ER/IR        Jonathan ER w/TBand        Doorway stretch                 Ther Activity                          Gait Training                          Modalities        HP/CP prn        US R ant shldr

## 2020-12-17 ENCOUNTER — APPOINTMENT (OUTPATIENT)
Dept: PHYSICAL THERAPY | Facility: HOME HEALTHCARE | Age: 50
End: 2020-12-17
Payer: COMMERCIAL

## 2020-12-18 ENCOUNTER — TRANSCRIBE ORDERS (OUTPATIENT)
Dept: PHYSICAL THERAPY | Facility: HOME HEALTHCARE | Age: 50
End: 2020-12-18

## 2020-12-18 ENCOUNTER — EVALUATION (OUTPATIENT)
Dept: PHYSICAL THERAPY | Facility: HOME HEALTHCARE | Age: 50
End: 2020-12-18
Payer: COMMERCIAL

## 2020-12-18 DIAGNOSIS — M75.51 BURSITIS OF RIGHT SHOULDER: Primary | ICD-10-CM

## 2020-12-18 PROCEDURE — 97535 SELF CARE MNGMENT TRAINING: CPT | Performed by: PHYSICAL THERAPIST

## 2020-12-18 PROCEDURE — 97140 MANUAL THERAPY 1/> REGIONS: CPT | Performed by: PHYSICAL THERAPIST

## 2020-12-18 PROCEDURE — 97161 PT EVAL LOW COMPLEX 20 MIN: CPT | Performed by: PHYSICAL THERAPIST

## 2020-12-21 ENCOUNTER — OFFICE VISIT (OUTPATIENT)
Dept: PHYSICAL THERAPY | Facility: HOME HEALTHCARE | Age: 50
End: 2020-12-21
Payer: COMMERCIAL

## 2020-12-21 DIAGNOSIS — M75.51 BURSITIS OF RIGHT SHOULDER: Primary | ICD-10-CM

## 2020-12-21 PROCEDURE — 97110 THERAPEUTIC EXERCISES: CPT | Performed by: PHYSICAL THERAPIST

## 2020-12-21 PROCEDURE — 97140 MANUAL THERAPY 1/> REGIONS: CPT | Performed by: PHYSICAL THERAPIST

## 2020-12-23 ENCOUNTER — OFFICE VISIT (OUTPATIENT)
Dept: PHYSICAL THERAPY | Facility: HOME HEALTHCARE | Age: 50
End: 2020-12-23
Payer: COMMERCIAL

## 2020-12-23 DIAGNOSIS — M75.51 BURSITIS OF RIGHT SHOULDER: Primary | ICD-10-CM

## 2020-12-23 PROCEDURE — 97110 THERAPEUTIC EXERCISES: CPT | Performed by: PHYSICAL THERAPIST

## 2020-12-23 PROCEDURE — 97140 MANUAL THERAPY 1/> REGIONS: CPT | Performed by: PHYSICAL THERAPIST

## 2020-12-29 ENCOUNTER — OFFICE VISIT (OUTPATIENT)
Dept: PHYSICAL THERAPY | Facility: HOME HEALTHCARE | Age: 50
End: 2020-12-29
Payer: COMMERCIAL

## 2020-12-29 DIAGNOSIS — M75.51 BURSITIS OF RIGHT SHOULDER: Primary | ICD-10-CM

## 2020-12-29 PROCEDURE — 97110 THERAPEUTIC EXERCISES: CPT | Performed by: PHYSICAL THERAPIST

## 2020-12-29 PROCEDURE — 97140 MANUAL THERAPY 1/> REGIONS: CPT | Performed by: PHYSICAL THERAPIST

## 2020-12-31 ENCOUNTER — OFFICE VISIT (OUTPATIENT)
Dept: PHYSICAL THERAPY | Facility: HOME HEALTHCARE | Age: 50
End: 2020-12-31
Payer: COMMERCIAL

## 2020-12-31 DIAGNOSIS — M75.51 BURSITIS OF RIGHT SHOULDER: Primary | ICD-10-CM

## 2020-12-31 PROCEDURE — 97110 THERAPEUTIC EXERCISES: CPT

## 2021-01-04 ENCOUNTER — OFFICE VISIT (OUTPATIENT)
Dept: PHYSICAL THERAPY | Facility: HOME HEALTHCARE | Age: 51
End: 2021-01-04
Payer: COMMERCIAL

## 2021-01-04 DIAGNOSIS — M75.51 BURSITIS OF RIGHT SHOULDER: Primary | ICD-10-CM

## 2021-01-04 PROCEDURE — 97110 THERAPEUTIC EXERCISES: CPT | Performed by: PHYSICAL THERAPIST

## 2021-01-04 PROCEDURE — 97140 MANUAL THERAPY 1/> REGIONS: CPT | Performed by: PHYSICAL THERAPIST

## 2021-01-04 NOTE — PROGRESS NOTES
Daily Note     Today's date: 2021  Patient name: Jese Frederick  : 1970  MRN: [id number]  Referring provider: Maricruz Marti MD  Dx:   Encounter Diagnosis     ICD-10-CM    1  Bursitis of right shoulder  M75 51                   Subjective: The patient reports that Saturday was the best day for him this weekend  "I was sore yesterday and couldn't get comfortable and I'm still sore today "  He will be going back to see the doctor tomorrow for his follow up appointment  Objective: See treatment diary below      Assessment: The patient with good tolerance to all TE today except pain with abduction motions, especially with the cane  Some verbal cues are needed for correct form with completing TE  He remains limited with ROM and strength  CP x 10 minutes to end session and pain returned to baseline after this  Continued PT would be beneficial to improve function  Plan: Continue per plan of care  Progress per protocol  He is to see the doctor tomorrow and to await any recommendations  Precautions: Per Hermann Chen can remove stitches at 7-10 days post-op  Can change protocol on 21 at 4 weeks post-op      Re-Eval DUE: 21     Manuals 20   R Shoulder  10 minutes   To tolerance 10 minutes to tolerance  10 minutes to tolerance 10 minutes to tolerance 10 minutes to tolerance                 Neuro Re-Ed             MTP/LTP                         Ther Ex 20   Pendulums 1 x 10 each 1 x 10 each 1 x 10 each 1 x 10 each 1x10 ea   Elbow ROM 1 x 10  1 x 10  1 x 10 1 x 10 1x10   Wrist ROM 1 x 10 each 1 x 10 each 1 x 10 each 1 x 10 each 1x10 ea   Digiflex - Comp/Ind Blue 1 x 15 each Green 1 x 15 each  Green 1 x 15 each Blue 1 x 15 each Blue 1x15 ea   Shrugs/Rolls 1 x 10 each 1 x 10 each 1 x 10 each 1 x 10 each 1x10 ea   Retractions 1 x 10 1 x 10  1 x 10 1 x 10 1x10   UBE Alt 10 minutes Alt 10 minutes Alt 10 minutes Alt 10 minutes Alt 10 minutes   Finger Ladder - Flex/Abd 1 x 3 each 1 x 3 each 1 x 3 each 1 x 3 each 1 x 3 each   Pulleys Flex/Abd 2 mins each 2 mins each 2 mins each 2 mins each 2 mins ea   Table Slides - Flex/Abd 1 x 10 each 1 x 10 each  1 x 10 each 1 x 10 each 1x10 ea   Isometrics Submax 3" 1 x 10 each NV NV 3" 1 x 10 each 3"x10 ea   Cane TE - Flex/Abd/ER 1 x 10 each  Flex/Abd NV 1 x 10 each Flex/Abd 1 x 10 each  Flex/Abd 1 x 10 each  Flex/Abd   S/L ER 2 x 10     2x10   Prone Flex 2 x 10    2x10    Prone Horiz Abd 1 x 10     1x10   TBand ER/IR        Jonathan ER w/TBand Red 2 x 10     RTB 2x10   Ther Activity                          Gait Training                          Modalities 1/4/21 12/21/20 12/23/20 12/29/20 12/31/20   CP prn Seated 10 minues 10 minutes Supine   10 minutes Seated  10 minutes Seated 10 min

## 2021-01-07 ENCOUNTER — OFFICE VISIT (OUTPATIENT)
Dept: PHYSICAL THERAPY | Facility: HOME HEALTHCARE | Age: 51
End: 2021-01-07
Payer: COMMERCIAL

## 2021-01-07 DIAGNOSIS — M75.51 BURSITIS OF RIGHT SHOULDER: Primary | ICD-10-CM

## 2021-01-07 PROCEDURE — 97110 THERAPEUTIC EXERCISES: CPT

## 2021-01-07 NOTE — PROGRESS NOTES
Daily Note     Today's date: 2021  Patient name: Tahmina Perez  : 1970  MRN: [id number]  Referring provider: Silvia Tyson MD  Dx:   Encounter Diagnosis     ICD-10-CM    1  Bursitis of right shoulder  M75 51        Start Time: 745  Stop Time: 840  Total time in clinic (min): 55 minutes    Subjective: Patient notes trouble sleeping secondary to pain at his anterior shoulder/along his clavicle and lateral deltoid  Utilizing prescription medication and ice for pain  Objective: See treatment diary below    Assessment: Patient is progressing well as per the surgical protocol  Abd continues to be the most challenging movement  PROM within current protocol  Only pain with cane abd this visit  Minimal verbal/tactile cueing for proper form and intensity  Plan: Continue per plan of care  Progress per protocol  Precautions: Per Tom Landon can remove stitches at 7-10 days post-op  Can change protocol on 21 at 4 weeks post-op      Re-Eval DUE: 21     Manuals 20   R Shoulder  10 minutes   To tolerance 10 minutes to tolerance 10 minutes to tolerance 10 minutes to tolerance 10 minutes to tolerance                Neuro Re-Ed            MTP/LTP                         Ther Ex 20   Pendulums 1 x 10 each 1x10 ea 1 x 10 each 1 x 10 each 1x10 ea   Elbow ROM 1 x 10  1x10  1 x 10 1 x 10 1x10   Wrist ROM 1 x 10 each 1x10 each 1 x 10 each 1 x 10 each 1x10 ea   Digiflex - Comp/Ind Blue 1 x 15 each Blue 1x15 each Green 1 x 15 each Blue 1 x 15 each Blue 1x15 ea   Shrugs/Rolls 1 x 10 each 1x10 each 1 x 10 each 1 x 10 each 1x10 ea   Retractions 1 x 10 1x10 1 x 10 1 x 10 1x10   UBE Alt 10 minutes Alt 10 minutes Alt 10 minutes Alt 10 minutes Alt 10 minutes   Finger Ladder - Flex/Abd 1 x 3 each 1x13 each 1 x 3 each 1 x 3 each 1 x 3 each   Pulleys Flex/Abd 2 mins each 2 mins each 2 mins each 2 mins each 2 mins ea   Table Slides - Flex/Abd 1 x 10 each 1x10 each 1 x 10 each 1 x 10 each 1x10 ea   Isometrics Submax 3" 1 x 10 each 3" 1x10 each NV 3" 1 x 10 each 3"x10 ea   Cane TE - Flex/Abd/ER 1 x 10 each  Flex/Abd 1x10 each flex/abd 1 x 10 each Flex/Abd 1 x 10 each  Flex/Abd 1 x 10 each  Flex/Abd   S/L ER 2 x 10  2x10   2x10   Prone Flex 2 x 10 2x10   2x10    Prone Horiz Abd 1 x 10  2x10   1x10   TBand ER/IR        Jonathan ER w/TBand Red 2 x 10  Red 2x10   RTB 2x10   Ther Activity                          Gait Training                          Modalities 1/4/21 1/7/21 12/23/20 12/29/20 12/31/20   CP prn Seated 10 minues Seated 10 minutes Supine   10 minutes Seated  10 minutes Seated 10 min

## 2021-01-11 ENCOUNTER — OFFICE VISIT (OUTPATIENT)
Dept: PHYSICAL THERAPY | Facility: HOME HEALTHCARE | Age: 51
End: 2021-01-11
Payer: COMMERCIAL

## 2021-01-11 DIAGNOSIS — M75.51 BURSITIS OF RIGHT SHOULDER: Primary | ICD-10-CM

## 2021-01-11 PROCEDURE — 97110 THERAPEUTIC EXERCISES: CPT

## 2021-01-11 PROCEDURE — 97140 MANUAL THERAPY 1/> REGIONS: CPT

## 2021-01-11 NOTE — PROGRESS NOTES
Daily Note     Today's date: 2021  Patient name: Marjorie Rothman  : 1970  MRN: [id number]  Referring provider: Zoraida Millan MD  Dx:   Encounter Diagnosis     ICD-10-CM    1  Bursitis of right shoulder  M75 51                   Subjective: Morning achiness of 2/10  My sh bothers me in the morning and at night  Objective: See treatment diary below    Assessment: Pt liat TE and MT well and with c/o minimal discomfort at t/o ex  He does remain with strength and ROM deficits and exhibits tightness at end range in all planes of A/PROM  Pt reports same pain of 2/10 at end with CP  Patient would benefit from continued PT    Plan: Continue per plan of care  Precautions: Per Dora Marinw can remove stitches at 7-10 days post-op  Can change protocol on 21 at 4 weeks post-op      Re-Eval DUE: 21     Manuals 20   R Shoulder  10 minutes   To tolerance 10 minutes to tolerance 10 minutes to tolerance 10 minutes to tolerance 10 minutes to tolerance                Neuro Re-Ed            MTP/LTP                         Ther Ex 20   Pendulums 1 x 10 each 1x10 ea 1 x 10 each 1 x 10 each 1x10 ea   Elbow ROM 1 x 10  1x10  1 x 10 1 x 10 1x10   Wrist ROM 1 x 10 each 1x10 each 1 x 10 each 1 x 10 each 1x10 ea   Digiflex - Comp/Ind Blue 1 x 15 each Blue 1x15 each Blue 1 x 15 each Blue 1 x 15 each Blue 1x15 ea   Shrugs/Rolls 1 x 10 each 1x10 each 1 x 10 each 1 x 10 each 1x10 ea   Retractions 1 x 10 1x10 1 x 10 1 x 10 1x10   UBE Alt 10 minutes Alt 10 minutes Alt 10 minutes Alt 10 minutes Alt 10 minutes   Finger Ladder - Flex/Abd 1 x 3 each 1x13 each 1 x 3 each 1 x 3 each 1 x 3 each   Pulleys Flex/Abd 2 mins each 2 mins each 2 mins each 2 mins each 2 mins ea   Table Slides - Flex/Abd 1 x 10 each 1x10 each 1 x 10 each 1 x 10 each 1x10 ea   Isometrics Submax 3" 1 x 10 each 3" 1x10 each 3" 1  x10 ea 3" 1 x 10 each 3"x10 ea   Cane TE - Flex/Abd/ER 1 x 10 each  Flex/Abd 1x10 each flex/abd 1 x 10 each Flex/Abd 1 x 10 each  Flex/Abd 1 x 10 each  Flex/Abd   S/L ER 2 x 10  2x10 2 x 10  2x10   Prone Flex 2 x 10 2x10 2 x 10  2x10    Prone Horiz Abd 1 x 10  2x10 2 x 10  1x10   TBand ER/IR        Jonathan ER w/TBand Red 2 x 10  Red 2x10 Red 2 x 10  RTB 2x10   Ther Activity                          Gait Training                          Modalities 1/4/21 1/7/21 1-11-21 12/29/20 12/31/20   CP prn Seated 10 minues Seated 10 minutes Supine   10 minutes Seated  10 minutes Seated 10 min

## 2021-01-14 ENCOUNTER — OFFICE VISIT (OUTPATIENT)
Dept: PHYSICAL THERAPY | Facility: HOME HEALTHCARE | Age: 51
End: 2021-01-14
Payer: COMMERCIAL

## 2021-01-14 DIAGNOSIS — M75.51 BURSITIS OF RIGHT SHOULDER: Primary | ICD-10-CM

## 2021-01-14 PROCEDURE — 97110 THERAPEUTIC EXERCISES: CPT

## 2021-01-14 PROCEDURE — 97140 MANUAL THERAPY 1/> REGIONS: CPT

## 2021-01-18 ENCOUNTER — EVALUATION (OUTPATIENT)
Dept: PHYSICAL THERAPY | Facility: HOME HEALTHCARE | Age: 51
End: 2021-01-18
Payer: COMMERCIAL

## 2021-01-18 DIAGNOSIS — M75.51 BURSITIS OF RIGHT SHOULDER: Primary | ICD-10-CM

## 2021-01-18 PROCEDURE — 97140 MANUAL THERAPY 1/> REGIONS: CPT

## 2021-01-18 PROCEDURE — 97110 THERAPEUTIC EXERCISES: CPT

## 2021-01-18 NOTE — PROGRESS NOTES
Daily Note     Today's date: 2021  Patient name: Kraig Gray  : 1970  MRN: [id number]  Referring provider: Allie Lemus MD  Dx:   Encounter Diagnosis     ICD-10-CM    1  Bursitis of right shoulder  M75 51                   Subjective: Pt reports his R shoulder is sore this morning and reports 3-4/10 pain  Objective: See treatment diary below      Assessment: Tolerated treatment well  Pt reports some soreness R shoulder with exercise but no increased pain  End range tightness noted R shoulder  Patient would benefit from continued PT to improve ROM, strength and  overall function  Plan: Continue per plan of care  Precautions: Per Raciel King can remove stitches at 7-10 days post-op  Can change protocol on 21 at 4 weeks post-op      Re-Eval DUE: 21     Manuals 21   R Shoulder  10 minutes   To tolerance 10 minutes to tolerance 10 minutes to tolerance 10' to liat 10 minutes to tolerance                Neuro Re-Ed            MTP/LTP                         Ther Ex 21   Pendulums 1 x 10 each 1x10 ea 1 x 10 each 1 x 10 each 1x10 ea   Elbow ROM 1 x 10  1x10  1 x 10 1 x 10 1x10   Wrist ROM 1 x 10 each 1x10 each 1 x 10 each DC DC   Digiflex - Comp/Ind Blue 1 x 15 each Blue 1x15 each Blue 1 x 15 each DC DC   Shrugs/Rolls 1 x 10 each 1x10 each 1 x 10 each 2# 1 x 10 each 1x10 ea 2 #   Retractions 1 x 10 1x10 1 x 10 DC DC   MTP/LTP    Green 1 x 10 ea Green 1 x 10 ea    UBE Alt 10 minutes Alt 10 minutes Alt 10 minutes Alt 10 minutes Alt 10 minutes   Finger Ladder - Flex/Abd 1 x 3 each 1x13 each 1 x 3 each 1 x 3 each 1 x 3 each   Pulleys Flex/Abd 2 mins each 2 mins each 2 mins each 2 mins each 2 mins ea   Table Slides - Flex/Abd 1 x 10 each 1x10 each 1 x 10 each DC DC   Wall slides    1 x 10 ea 1 x 10 ea    Isometrics Submax 3" 1 x 10 each 3" 1x10 each 3" 1  x10 ea 3" 1 x 10 each 3"x10 ea   Cane TE - Flex/Abd/ER 1 x 10 each  Flex/Abd 1x10 each flex/abd 1 x 10 each Flex/Abd 1 x 10 each  Flex/Abd 1 x 10 each  Flex/Abd   S/L ER 2 x 10  2x10 2 x 10 2  x10 2x10   S/L ABd    1 x 10 1 x 10    Prone Flex 2 x 10 2x10 2 x 10 2 x 10 2x10    Prone Horiz Abd 1 x 10  2x10 2 x 10 2 x 10 2x10   TBand ER/IR        Jonathan ER w/TBand Red 2 x 10  Red 2x10 Red 2 x 10 Green 2 x 10 GTB 2x10   Ther Activity                          Gait Training                          Modalities 1/4/21 1/7/21 1-11-21 1-14-21 1/18/21   CP prn Seated 10 minues Seated 10 minutes Supine   10 minutes Pt declined   to home Seated 10 min

## 2021-01-18 NOTE — PROGRESS NOTES
PT Re-Evaluation     Today's date: 2021  Patient name: Mike Reeder  : 1970  MRN: [id number]  Referring provider: Indy Sánchez MD  Dx:   Encounter Diagnosis     ICD-10-CM    1  Bursitis of right shoulder  M75 51                   Assessment  Assessment details: The patient has been seen in PT for a total of 11 visits since [facility] with good PT attendance noted  He has made improvements since [facility] and is making progress towards his goals  He has complaints of constant pain along his anterior shoulder and into his upper arm, increased pain noted since Monday  He denies any numbness and tingling into RUE  He demonstrates deficits with decreased A/PROM and strength, decreased flexibility, decreased postural awareness, difficulty sleeping and pain and difficulty with completing his ADLs  He has not been using his sling over the past week  Increased ease with completing his ADLs though he still compensates with use of LUE at times  He has difficulty with completing tasks such as reaching [facility], out to the side and behind his back  He still has difficulty with sleeping and pain can wake him up overnight or he has a hard time finding a comfortable position to sleep  He remains off work  Secondary to pain and above deficits he is limited with his overall mobility and function  The patient would benefit from continued PT to address deficits and improve function  Tx to include ROM, stretching, strengthening, modalities, HEP, pt education, postural ed, lifting/body mechanics, neuro re-ed, balance/proprioception Te, MT and equipment  Plan to continue with PT until his doctor appointment and to await any recommendations  Have instructed patient to phone doctor about getting an earlier appointment secondary to bruising and increased pain          Impairments: abnormal or restricted ROM, activity intolerance, impaired physical strength, lacks appropriate home exercise program, pain with function, poor posture  and poor body mechanics  Other impairment: decreased flexibility, TTP along with soft tissue inflammation  Functional limitations: difficulty sleeping  Understanding of Dx/Px/POC: good   Prognosis: good    Goals  STGs:  1  Initiate and complete HEP with verbal cues  - met  2  Decrease R shoulder pain by > 25% in 4 weeks  - not met  3  Improve R shoulder strength by 1/2 grade in 4 weeks  - NA today  4  Improve R shoulder ROM by 15-20 degrees in 4 weeks  - met  LTGs:  1  Patient to be I with HEP in 12 weeks  2   Improve R shoulder ROM to WNL t/o in 12 weeks to improve function  3   Improve RUE strength to > or = to 4+ to 5/5 t/o in 12 weeks to improve function  4   Decrease R shoulder pain to < or = to 2-3/10 with activity in 12 weeks to improve function  5   Recreational performance in related activities is improved to PLOF in 12 weeks  6   Postural control is improved to maximal level of function in 12 weeks  7   Work performance is improved to PLOF in 12 weeks  8   ADL performance is improved to PLOF in 12 weeks  9   Patient to report improved sleep in 12 weeks  Plan  Plan details: Modalities and therapy interventions prn  Plan to continue with PT until his doctor appointment and to await any recommendations  Have instructed patient to phone doctor about getting an earlier appointment secondary to bruising and increased pain    Patient would benefit from: skilled physical therapy  Planned modality interventions: cryotherapy, thermotherapy: hydrocollator packs and unattended electrical stimulation  Planned therapy interventions: manual therapy, body mechanics training, behavior modification, neuromuscular re-education, patient education, postural training, self care, strengthening, stretching, therapeutic activities, therapeutic exercise, flexibility and home exercise program  Frequency: 3x week (2-3 times/week)  Duration in weeks: 12  Plan of Care beginning date: 2021  Plan of Care expiration date: 2021  Treatment plan discussed with: patient        Subjective Evaluation    History of Present Illness  Date of surgery: 2020  Mechanism of injury: surgery  Mechanism of injury: The patient states that around July he developed R shoulder pain with no known cause  He notes that it was sore and stiff in the morning but the pain had been getting progressively worse with pain waking him up at night  He had gone to see Dr Justin Escobar in October  He had x-rays completed, per patient they were negative  He was given a cortisone shot which did help  He was also prescribed medicine which has been helping  He was also referred to OPPT and he was previously seen in PT from 10/14/20 to 20 for a total of 6 visits  He had gone back to the doctor  He had an MRI completed and per patient it showed arthritis in the joint, bone spur, inflamed bursa sac and nerve impingement  The doctor had recommended surgery and he had same day surgery on 20  He was referred to OPPT and he now presents for his evaluation  He will be going back to see the doctor on 21 for his next appointment  UPDATE 21:  The patient states that he had more pain since he was in PT on Monday  Since Monday pain has been a constant 6-7/10  He notes that last night he noticed a bruise on his arm just above his elbow  He will be going back to see the doctor next Friday for his next appointment  Quality of life: good    Pain  At best pain ratin  At worst pain ratin  Location: R Shoulder - anterior shoulder, top of shoulder, into upper arm    Denies N&T  Quality: discomfort, dull ache and radiating  Relieving factors: medications and ice    Social Support  Lives with: spouse    Employment status: not working (Currently off work -  )  Hand dominance: right    Treatments  Previous treatment: physical therapy, injection treatment and medication  Patient Goals  Patient goals for therapy: decreased pain, increased motion, increased strength, return to work and independence with ADLs/IADLs  Patient goal: "To help get full ROM and strength, to get more mobility, get full use of my arm, to reduce the pain "          Objective     Static Posture     Shoulders  Rounded  Observations     Right Shoulder  Positive for incision  Additional Observation Details  Three portal sites noted t/o R shoulder - well healed  Bruise noted today along distal upper arm, above elbow  Tenderness     Right Shoulder  Tenderness in the [facility] joint and acromion  Additional Tenderness Details  TTP around incision sites  Neurological Testing     Sensation     Shoulder   Left Shoulder   Intact: light touch    Right Shoulder   Intact: light touch    Active Range of Motion   Left Shoulder   Flexion: 180 degrees   Abduction: 180 degrees   External rotation 90°: 90 degrees   Internal rotation 90°: 60 degrees     Right Shoulder   Flexion: 156 degrees   Abduction: 90 degrees with pain  External rotation 45°: 45 degrees with pain  Internal rotation 45°: 50 degrees     Left Elbow   Flexion: WFL  Extension: WFL    Right Elbow   Flexion: WFL  Extension: WFL    Passive Range of Motion     Right Shoulder   Flexion: 170 degrees with pain  Abduction: 150 degrees with pain  External rotation 45°: 55 degrees with pain  Internal rotation 45°: 55 degrees with pain    Strength/Myotome Testing     Left Shoulder   Normal muscle strength    Left Elbow   Normal strength    Additional Strength Details  R Shoulder Strength NT today  R Elbow Strength NT today  Ambulation   Weight-Bearing Status   Assistive device used: none    Additional Weight-Bearing Status Details  No longer using sling - stopped about one week ago  Ambulation: Level Surfaces   Ambulation without assistive device: independent             Precautions: Per Hector Youngblood can remove stitches at 7-10 days post-op    Can change protocol on 1/27/21 at 6 weeks post-op      Re-Eval DUE: 2/21/21     Manuals 1/21/21 1/7/21 1-11-21 1-14-21 1/18/21   R Shoulder  10 minutes   To tolerance 10 minutes to tolerance 10 minutes to tolerance 10' to liat 10 minutes to tolerance                 Neuro Re-Ed             MTP/LTP Green 1 x 10 each     Green 1 x 10 each Green 1 x 10 each                               Ther Ex 1/21/21 1/7/21 1-11-21 1-14-21 1/18/21   Pendulums 1 x 10 each 1x10 ea 1 x 10 each 1 x 10 each 1x10 ea   Elbow ROM D/C 1x10  1 x 10 1 x 10 1x10   Wrist ROM D/C 1x10 each 1 x 10 each DC DC   Digiflex - Comp/Ind D/C Blue 1x15 each Blue 1 x 15 each DC DC   Shrugs/Rolls 1 x 10 each 2# 1x10 each 1 x 10 each 2# 1 x 10 each 1x10 ea 2 #   Retractions D/C 1x10 1 x 10 DC DC   UBE Alt 10 minutes Alt 10 minutes Alt 10 minutes Alt 10 minutes Alt 10 minutes   Finger Ladder - Flex/Abd 1 x 3 each 1x13 each 1 x 3 each 1 x 3 each 1 x 3 each   Pulleys Flex/Abd 2 mins each 2 mins each 2 mins each 2 mins each 2 mins ea   Table Slides - Flex/Abd D/C 1x10 each 1 x 10 each DC DC   Wall slides 1 x 10 each     1 x 10 ea 1 x 10 ea    Isometrics Submax 3" 1 x 10 each 3" 1x10 each 3" 1  x10 ea 3" 1 x 10 each 3"x10 ea   Cane TE - Flex/Abd/ER 1 x 10 each  Flex/Abd 1x10 each flex/abd 1 x 10 each Flex/Abd 1 x 10 each  Flex/Abd 1 x 10 each  Flex/Abd   S/L ER 2 x 10  2x10 2 x 10 2  x10 2x10   S/L ABd 1 x 10     1 x 10 1 x 10    Prone Flex 2 x 10 2x10 2 x 10 2 x 10 2x10   Prone Horiz Abd 2 x 10  2x10 2 x 10 2 x 10 2x10   TBand ER/IR             Jonathan ER w/TBand Green 2 x 10  Red 2x10 Red 2 x 10 Green 2 x 10 GTB 2x10   Ther Activity                                         Gait Training                                         Modalities 1/21/21 1/7/21 1-11-21 1-14-21 1/18/21   CP prn Seated 10 minues Seated 10 minutes Supine   10 minutes Pt declined   to home Seated 10 min

## 2021-01-21 ENCOUNTER — EVALUATION (OUTPATIENT)
Dept: PHYSICAL THERAPY | Facility: HOME HEALTHCARE | Age: 51
End: 2021-01-21
Payer: COMMERCIAL

## 2021-01-21 ENCOUNTER — TRANSCRIBE ORDERS (OUTPATIENT)
Dept: PHYSICAL THERAPY | Facility: HOME HEALTHCARE | Age: 51
End: 2021-01-21

## 2021-01-21 DIAGNOSIS — M75.51 BURSITIS OF RIGHT SHOULDER: Primary | ICD-10-CM

## 2021-01-21 PROCEDURE — 97110 THERAPEUTIC EXERCISES: CPT | Performed by: PHYSICAL THERAPIST

## 2021-01-21 PROCEDURE — 97140 MANUAL THERAPY 1/> REGIONS: CPT | Performed by: PHYSICAL THERAPIST

## 2021-01-25 ENCOUNTER — OFFICE VISIT (OUTPATIENT)
Dept: PHYSICAL THERAPY | Facility: HOME HEALTHCARE | Age: 51
End: 2021-01-25
Payer: COMMERCIAL

## 2021-01-25 DIAGNOSIS — M75.51 BURSITIS OF RIGHT SHOULDER: Primary | ICD-10-CM

## 2021-01-25 PROCEDURE — 97110 THERAPEUTIC EXERCISES: CPT | Performed by: PHYSICAL THERAPIST

## 2021-01-25 PROCEDURE — 97140 MANUAL THERAPY 1/> REGIONS: CPT | Performed by: PHYSICAL THERAPIST

## 2021-01-25 NOTE — PROGRESS NOTES
Daily Note     Today's date: 2021  Patient name: Marjorie Rothman  : 1970  MRN: [id number]  Referring provider: Zoraida Millan MD  Dx:   Encounter Diagnosis     ICD-10-CM    1  Bursitis of right shoulder  M75 51                   Subjective: The patient states that he was sore over the weekend  He rates pain 7/10 at start of session  He had called the doctor and will be going to see him later this morning  Objective: See treatment diary below      Assessment: Fair tolerance to all TE today  He continues to have pain with all motions, especially abduction movement  His bruise on his upper arm is clearing up  He declined CP at end of session as he needed to get to his doctor appointment  Continued PT may be beneficial to improve function  Plan: Continue per plan of care  He is to see the doctor today and to await any recommendations  Precautions: Per Dora Borja can remove stitches at 7-10 days post-op  Can change protocol on 21 at 6 weeks post-op      Re-Eval DUE: 21     Manuals 21   R Shoulder  10 minutes   To tolerance 10 minutes to tolerance 10 minutes to tolerance 10' to liat 10 minutes to tolerance                 Neuro Re-Ed             MTP/LTP Green 1 x 10 each Green 1 x 10 each   Green 1 x 10 each Green 1 x 10 each                               Ther Ex 21   Pendulums 1 x 10 each 1 x 10 each 1 x 10 each 1 x 10 each 1x10 ea   Elbow ROM D/C D/C 1 x 10 1 x 10 1x10   Wrist ROM D/C D/C 1 x 10 each DC DC   Digiflex - Comp/Ind D/C D/C Blue 1 x 15 each DC DC   Shrugs/Rolls 1 x 10 each 2# 1 x 10 each 2# 1 x 10 each 2# 1 x 10 each 1x10 ea 2 #   Retractions D/C D/C 1 x 10 DC DC   UBE Alt 10 minutes Alt 10 minutes Alt 10 minutes Alt 10 minutes Alt 10 minutes   Finger Ladder - Flex/Abd 1 x 3 each 1 x 3 each 1 x 3 each 1 x 3 each 1 x 3 each   Pulleys Flex/Abd 2 mins each 2 mins each 2 mins each 2 mins each 2 mins ea   Table Slides - Flex/Abd D/C D/C 1 x 10 each DC DC   Wall slides 1 x 10 each 1 x 10 each   1 x 10 ea 1 x 10 ea    Isometrics Submax 3" 1 x 10 each 3" 1 x 10 each 3" 1  x10 ea 3" 1 x 10 each 3"x10 ea   Cane TE - Flex/Abd/ER 1 x 10 each  Flex/Abd 1 x 10 each Flex/abd 1 x 10 each Flex/Abd 1 x 10 each  Flex/Abd 1 x 10 each  Flex/Abd   S/L ER 2 x 10  2 x 10 2 x 10 2  x10 2x10   S/L ABd 1 x 10 1 x 10   1 x 10 1 x 10    Prone Flex 2 x 10 2 x 10 2 x 10 2 x 10 2x10   Prone Horiz Abd 2 x 10  2 x 10 2 x 10 2 x 10 2x10   TBand ER/IR             Jonathan ER w/TBand Green 2 x 10  Green  2 x 10 Red 2 x 10 Green 2 x 10 GTB 2x10   Ther Activity                                         Gait Training                                         Modalities 1/21/21 1/25/21 1-11-21 1-14-21 1/18/21   CP prn Seated 10 minues Declined Supine   10 minutes Pt declined   to home Seated 10 min

## 2021-01-28 ENCOUNTER — OFFICE VISIT (OUTPATIENT)
Dept: PHYSICAL THERAPY | Facility: HOME HEALTHCARE | Age: 51
End: 2021-01-28
Payer: COMMERCIAL

## 2021-01-28 DIAGNOSIS — M75.51 BURSITIS OF RIGHT SHOULDER: Primary | ICD-10-CM

## 2021-01-28 PROCEDURE — 97110 THERAPEUTIC EXERCISES: CPT

## 2021-01-28 PROCEDURE — 97140 MANUAL THERAPY 1/> REGIONS: CPT

## 2021-01-28 NOTE — PROGRESS NOTES
Daily Note     Today's date: 2021  Patient name: Tahmina Perez  : 1970  MRN: [id number]  Referring provider: Silvia Tyson MD  Dx: No diagnosis found  Subjective: I saw my Dr on Monday because of the bruising at my biceps  He said I may have torn a muscle but not sure  I am taking a steroid  Objective: See treatment diary below    Assessment: Tolerated treatment well  No advancement with current program 2* bruise remaining at Biceps area  Pt reports most discomfort occurring during extended fwd arm position during ADL's and sleep  Patient would benefit from continued PT    Plan: Continue per plan of care  Precautions: Per Tom Landon can remove stitches at 7-10 days post-op  Can change protocol on 21 at 6 weeks post-op      Re-Eval DUE: 21     Manuals 21   R Shoulder  10 minutes   To tolerance 10 minutes to tolerance 10' to liat 10' to liat 10 minutes to tolerance                 Neuro Re-Ed             MTP/LTP Green 1 x 10 each Green 1 x 10 each  Green 1 x 10 ea Green 1 x 10 each Green 1 x 10 each                               Ther Ex 21   Pendulums 1 x 10 each 1 x 10 each 1 x 10 each 1 x 10 each 1x10 ea   Shrugs/Rolls 1 x 10 each 2# 1 x 10 each 2# 2#  1 x 10 each 2# 1 x 10 each 1x10 ea 2 #   UBE Alt 10 minutes Alt 10 minutes Alt 10' Alt 10 minutes Alt 10 minutes   Finger Ladder - Flex/Abd 1 x 3 each 1 x 3 each 1 x 3 each 1 x 3 each 1 x 3 each   Pulleys Flex/Abd 2 mins each 2 mins each 2' each 2 mins each 2 mins ea   Wall slides 1 x 10 each 1 x 10 each  1  x10 ea 1 x 10 ea 1 x 10 ea    Isometrics Submax 3" 1 x 10 each 3" 1 x 10 each 3" 1  x10 ea 3" 1 x 10 each 3"x10 ea   Cane TE - Flex/Abd/ER 1 x 10 each  Flex/Abd 1 x 10 each Flex/abd 1 x 10 each   Flex/Abd 1 x 10 each  Flex/Abd 1 x 10 each  Flex/Abd   S/L ER 2 x 10  2 x 10 2 x 10 2  x10 2x10   S/L ABd 1 x 10 1 x 10  1 x 10 1 x 10 1 x 10    Prone Flex 2 x 10 2 x 10 2 x 10 2 x 10 2x10   Prone Horiz Abd 2 x 10  2 x 10 2 x 10 2 x 10 2x10   TBand ER/IR             Jonathan ER w/TBand Green 2 x 10  Green  2 x 10 Green  2 x 10 Green 2 x 10 GTB 2x10   Ther Activity                                         Gait Training                                         Modalities 1/21/21 1/25/21 1-28-21 1-14-21 1/18/21   CP prn Seated 10 minues Declined Supine   10' Pt declined   to home Seated 10 min

## 2021-02-01 ENCOUNTER — APPOINTMENT (OUTPATIENT)
Dept: PHYSICAL THERAPY | Facility: HOME HEALTHCARE | Age: 51
End: 2021-02-01
Payer: COMMERCIAL

## 2021-02-02 ENCOUNTER — APPOINTMENT (OUTPATIENT)
Dept: PHYSICAL THERAPY | Facility: HOME HEALTHCARE | Age: 51
End: 2021-02-02
Payer: COMMERCIAL

## 2021-02-04 ENCOUNTER — OFFICE VISIT (OUTPATIENT)
Dept: PHYSICAL THERAPY | Facility: HOME HEALTHCARE | Age: 51
End: 2021-02-04
Payer: COMMERCIAL

## 2021-02-04 DIAGNOSIS — M75.51 BURSITIS OF RIGHT SHOULDER: Primary | ICD-10-CM

## 2021-02-04 PROCEDURE — 97110 THERAPEUTIC EXERCISES: CPT

## 2021-02-04 PROCEDURE — 97140 MANUAL THERAPY 1/> REGIONS: CPT

## 2021-02-04 NOTE — PROGRESS NOTES
Daily Note     Today's date: 2021  Patient name: Clinton Lam  : 1970  MRN: [id number]  Referring provider: Lawton Councilman, MD  Dx:   Encounter Diagnosis     ICD-10-CM    1  Bursitis of right shoulder  M75 51                   Subjective: I was doing better when taking the steroid pack but now that's done and my pain has gone back up  Pain of 6/10 today  I RTD this afternoon  Objective: See treatment diary below    Assessment: Tolerated treatment well and is  Able to complete TE with minimal vc's or S  Pt did report L sh to biceps pain t/o TE but was able to complete ex without the need to reduce reps or resistance  Pt remains with strength and ROM deficits and would benefit from continued PT    Plan: Continue per plan of care  Precautions: Per Jennifer Pacheco can remove stitches at 7-10 days post-op  Can change protocol on 21 at 6 weeks post-op      Re-Eval DUE: 21     Manuals 21   R Shoulder  10 minutes   To tolerance 10 minutes to tolerance 10' to liat 10' to liat 10 minutes to tolerance                 Neuro Re-Ed             MTP/LTP Green 1 x 10 each Green 1 x 10 each  Green 1 x 10 ea Green x 20 each Green 1 x 10 each                               Ther Ex 21   Pendulums 1 x 10 each 1 x 10 each 1 x 10 each 1 x 10 each 1x10 ea   Shrugs/Rolls 1 x 10 each 2# 1 x 10 each 2# 2#  1 x 10 each 2# 1 x 10 each 1x10 ea 2 #   UBE Alt 10 minutes Alt 10 minutes Alt 10' Alt 10' Alt 10 minutes   Finger Ladder - Flex/Abd 1 x 3 each 1 x 3 each 1 x 3 each 1 x 3 each 1 x 3 each   Pulleys Flex/Abd 2 mins each 2 mins each 2' each 2' each 2 mins ea   Wall slides 1 x 10 each 1 x 10 each  1  x10 ea 1 x 10 ea 1 x 10 ea    Isometrics Submax 3" 1 x 10 each 3" 1 x 10 each 3" 1  x10 ea 3" 1 x 10 each 3"x10 ea   Cane TE - Flex/Abd/ER 1 x 10 each  Flex/Abd 1 x 10 each Flex/abd 1 x 10 each   Flex/Abd 1 x 10 each  Flex/Abd 1 x 10 each  Flex/Abd   S/L ER 2 x 10  2 x 10 2 x 10 1 x 15 2x10   S/L ABd 1 x 10 1 x 10  1 x 10 1 x 15 1 x 10    Prone Flex 2 x 10 2 x 10 2 x 10 2 x 10 2x10   Prone Horiz Abd 2 x 10  2 x 10 2 x 10 2 x 10 2x10   TBand ER/IR             Jonathan ER w/TBand Green 2 x 10  Green  2 x 10 Green  2 x 10 Green 2 x 10 GTB 2x10   Ther Activity                                         Gait Training                                         Modalities 1/21/21 1/25/21 1-28-21 2-4-21 1/18/21   CP prn Seated 10 minues Declined Supine   10' Pt declined   to home Seated 10 min

## 2021-02-08 ENCOUNTER — OFFICE VISIT (OUTPATIENT)
Dept: PHYSICAL THERAPY | Facility: HOME HEALTHCARE | Age: 51
End: 2021-02-08
Payer: COMMERCIAL

## 2021-02-08 DIAGNOSIS — M75.51 BURSITIS OF RIGHT SHOULDER: Primary | ICD-10-CM

## 2021-02-08 PROCEDURE — 97140 MANUAL THERAPY 1/> REGIONS: CPT | Performed by: PHYSICAL THERAPIST

## 2021-02-08 PROCEDURE — 97110 THERAPEUTIC EXERCISES: CPT | Performed by: PHYSICAL THERAPIST

## 2021-02-08 NOTE — PROGRESS NOTES
Daily Note     Today's date: 2021  Patient name: Tomeka Ye  : 1970  MRN: [id number]  Referring provider: Marino Maloney MD  Dx:   Encounter Diagnosis     ICD-10-CM    1  Bursitis of right shoulder  M75 51                   Subjective: Pt notes that the shoulder feels "tight" or "stiff" all the time  Objective: See treatment diary below      Assessment: Tolerable to progression of TE this date with some soreness in the R shoulder but overall decrease in stiffness to end session  Continue with strengthening and stability program as agreeable  Plan: Continue per plan of care  Precautions: Joe Carrero can remove stitches at 7-10 days post-op  Can change protocol on 21 at 6 weeks post-op      Re-Eval DUE: 21      Manuals 21   R Shoulder  10 minutes   To tolerance 10 minutes to tolerance 10' to liat 10' to liat 10' to liat                 Neuro Re-Ed            MTP/LTP Green 1 x 10 each Green 1 x 10 each  Green 1 x 10 ea Green x 20 each Green x 20                              Ther Ex 21    Pendulums 1 x 10 each 1 x 10 each 1 x 10 each 1 x 10 each DC   Shrugs/Rolls 1 x 10 each 2# 1 x 10 each 2# 2#  1 x 10 each 2# 1 x 10 each DC   UBE Alt 10 minutes Alt 10 minutes Alt 10' Alt 10' Alt 10'    Finger Ladder - Flex/Abd 1 x 3 each 1 x 3 each 1 x 3 each 1 x 3 each 1 x 3 ea    Pulleys Flex/Abd 2 mins each 2 mins each 2' each 2' each DC   Wall slides 1 x 10 each 1 x 10 each  1  x10 ea 1 x 10 ea 1 x 15    Isometrics Submax 3" 1 x 10 each 3" 1 x 10 each 3" 1  x10 ea 3" 1 x 10 each DC   Cane TE - Flex/Abd/ER 1 x 10 each  Flex/Abd 1 x 10 each Flex/abd 1 x 10 each   Flex/Abd 1 x 10 each  Flex/Abd    S/L ER 2 x 10  2 x 10 2 x 10 1 x 15 1 x 20    S/L ABd 1 x 10 1 x 10  1 x 10 1 x 15 1 x 20    Prone Flex 2 x 10 2 x 10 2 x 10 2 x 10 2 x 10    Prone Horiz Abd 2 x 10  2 x 10 2 x 10 2 x 10 2 x 10    TBand ER/IR            Jonathan ER w/TBand Green 2 x 10  Green  2 x 10 Green  2 x 10 Green 2 x 10 Green x 20    Standing FF/Abd to 90*     2#    SA punches         Supine ABC's      X 1    Wall pushups         Ball on wall   Up/down; L/R  CW/CCW     Red weighted ball  X 10 ea dir            Ther Activity                                      Gait Training                                      Modalities 1/21/21 1/25/21 1-28-21 2-4-21    CP prn Seated 10 minues Declined Supine   10' Pt declined   to home

## 2021-02-11 ENCOUNTER — OFFICE VISIT (OUTPATIENT)
Dept: PHYSICAL THERAPY | Facility: HOME HEALTHCARE | Age: 51
End: 2021-02-11
Payer: COMMERCIAL

## 2021-02-11 DIAGNOSIS — M75.51 BURSITIS OF RIGHT SHOULDER: Primary | ICD-10-CM

## 2021-02-11 PROCEDURE — 97140 MANUAL THERAPY 1/> REGIONS: CPT

## 2021-02-11 PROCEDURE — 97110 THERAPEUTIC EXERCISES: CPT

## 2021-02-11 NOTE — PROGRESS NOTES
Daily Note     Today's date: 2021  Patient name: Holly Perez  : 1970  MRN: [id number]  Referring provider: Shasha Stratton MD  Dx:   Encounter Diagnosis     ICD-10-CM    1  Bursitis of right shoulder  M75 51                   Subjective: Pt reports 5/10 pain in his R shoulder today  Objective: See treatment diary below      Assessment: Tolerated treatment well  Pt with no c/o increased pain R shoulder t/o session  Pt reports decreased pain at end of session after CP  Patient would benefit from continued PT to improve ROM, strength and overall function  Plan: Continue per plan of care  Precautions: Per Stanley Gramajo can remove stitches at 7-10 days post-op  Can change protocol on 21 at 6 weeks post-op      Re-Eval DUE: 21      Manuals 21 2   R Shoulder  10 minutes   To tolerance 10 minutes to tolerance 10' to liat 10' to liat 10' to liat                 Neuro Re-Ed            MTP/LTP Green 1 x 20 each Green 1 x 10 each  Green 1 x 10 ea Green x 20 each Green x 20                              Ther Ex  21    Pendulums DC 1 x 10 each 1 x 10 each 1 x 10 each DC   Shrugs/Rolls DC 1 x 10 each 2# 2#  1 x 10 each 2# 1 x 10 each DC   UBE Alt 10 minutes Alt 10 minutes Alt 10' Alt 10' Alt 10'    Finger Ladder - Flex/Abd 1 x 3 each 1 x 3 each 1 x 3 each 1 x 3 each 1 x 3 ea    Pulleys Flex/Abd DC 2 mins each 2' each 2' each DC   Wall slides 1 x 15 each 1 x 10 each  1  x10 ea 1 x 10 ea 1 x 15    Isometrics Submax DC 3" 1 x 10 each 3" 1  x10 ea 3" 1 x 10 each DC   Cane TE - Flex/Abd/ER  1 x 10 each Flex/abd 1 x 10 each   Flex/Abd 1 x 10 each  Flex/Abd    S/L ER 1 x 20  2 x 10 2 x 10 1 x 15 1 x 20    S/L ABd 1 x 20 1 x 10  1 x 10 1 x 15 1 x 20    Prone Flex 2 x 10 2 x 10 2 x 10 2 x 10 2 x 10    Prone Horiz Abd 2 x 10  2 x 10 2 x 10 2 x 10 2 x 10    TBand ER/IR            Jonathan ER w/TBand Green 2 x 10  Green  2 x 10 Green  2 x 10 Green 2 x 10 Green x 20    Standing FF/Abd to 90*     2#    SA punches         Supine ABC's  X 1     X 1    Wall pushups         Ball on wall   Up/down; L/R  CW/CCW Red weighted ball 1 x 10 ea dir    Red weighted ball  X 10 ea dir            Ther Activity                                      Gait Training                                      Modalities  1/25/21 1-28-21 2-4-21    CP prn Seated 10 minues Declined Supine   10' Pt declined   to home

## 2021-02-15 ENCOUNTER — OFFICE VISIT (OUTPATIENT)
Dept: PHYSICAL THERAPY | Facility: HOME HEALTHCARE | Age: 51
End: 2021-02-15
Payer: COMMERCIAL

## 2021-02-15 DIAGNOSIS — M75.51 BURSITIS OF RIGHT SHOULDER: Primary | ICD-10-CM

## 2021-02-15 PROCEDURE — 97140 MANUAL THERAPY 1/> REGIONS: CPT

## 2021-02-15 PROCEDURE — 97110 THERAPEUTIC EXERCISES: CPT

## 2021-02-15 NOTE — PROGRESS NOTES
Daily Note     Today's date: 2/15/2021  Patient name: Aminta Gonzalez  : 1970  MRN: [id number]  Referring provider: Na Espitia MD  Dx: No diagnosis found  Start Time: 745          Subjective: I feel pretty good right now  Pain of 1/10 but it increases with outward reaching movements     Objective: See treatment diary below    Assessment: Tolerated treatment well  Pt progressing slowly and consistently with PT  Able to complete entire program with minimal vc's or rests needed  Pt with end range tightness in all planes with abd and ER being most limited  Patient would benefit from continued PT    Plan: Continue per plan of care  Precautions: Per Marcia Hernandez can remove stitches at 7-10 days post-op  Can change protocol on 21 at 6 weeks post-op      Re-Eval DUE: 21      Manuals 2/11/21 2-15-21 1-28-21 2-4-21 2/8   R Shoulder  10 minutes   To tolerance 10' to liat 10' to liat 10' to liat 10' to liat                 Neuro Re-Ed            MTP/LTP Green 1 x 20 each Green 1 x 20 ea  Green 1 x 10 ea Green x 20 each Green x 20                              Ther Ex  1-15-21 1-28-21 2-4-21    Pendulums DC DC 1 x 10 each 1 x 10 each DC   Shrugs/Rolls DC DC 2#  1 x 10 each 2# 1 x 10 each DC   UBE Alt 10 minutes Alt 10' Alt 10' Alt 10' Alt 10'    Finger Ladder - Flex/Abd 1 x 3 each 1 x 3 each 1 x 3 each 1 x 3 each 1 x 3 ea    Pulleys Flex/Abd DC DC 2' each 2' each DC   Wall slides 1 x 15 each 1 x 15 ea  1  x10 ea 1 x 10 ea 1 x 15    Isometrics Submax DC DC 3" 1  x10 ea 3" 1 x 10 each DC   Cane TE - Flex/Abd/ER   1 x 10 each   Flex/Abd 1 x 10 each  Flex/Abd    S/L ER 1 x 20  2 x 10 2 x 10 1 x 15 1 x 20    S/L ABd 1 x 20 1 x 10  1 x 10 1 x 15 1 x 20    Prone Flex 2 x 10 2 x 10 2 x 10 2 x 10 2 x 10    Prone Horiz Abd 2 x 10  2 x 10 2 x 10 2 x 10 2 x 10    TBand ER/IR            Jonathan ER w/TBand Green 2 x 10  Green  2 x 10 Green  2 x 10 Green 2 x 10 Green x 20    Standing FF/Abd to 90*  2# 1 x 10 ea   2#    SA punches         Supine ABC's  X 1  X 1   X 1    Wall pushups         Ball on wall   Up/down; L/R  CW/CCW Red weighted ball 1 x 10 ea dir Red weighted ball  1 x 10 ea   Red weighted ball  X 10 ea dir            Ther Activity                                      Gait Training                                      Modalities  2-15-21 1-28-21 2-4-21    CP prn Seated 10 minues Seated 10' Supine   10' Pt declined   to home

## 2021-02-18 ENCOUNTER — EVALUATION (OUTPATIENT)
Dept: PHYSICAL THERAPY | Facility: HOME HEALTHCARE | Age: 51
End: 2021-02-18
Payer: COMMERCIAL

## 2021-02-18 DIAGNOSIS — M75.51 BURSITIS OF RIGHT SHOULDER: Primary | ICD-10-CM

## 2021-02-18 PROCEDURE — 97112 NEUROMUSCULAR REEDUCATION: CPT | Performed by: PHYSICAL THERAPIST

## 2021-02-18 PROCEDURE — 97110 THERAPEUTIC EXERCISES: CPT | Performed by: PHYSICAL THERAPIST

## 2021-02-18 NOTE — PROGRESS NOTES
PT Re-Evaluation     Today's date: 2021  Patient name: Lorelie Primrose  : 1970  MRN: [id number]  Referring provider: Pablo Baltazar MD  Dx:   Encounter Diagnosis     ICD-10-CM    1  Bursitis of right shoulder  M75 51                   Assessment  Assessment details: The patient has been seen in PT for 2 months with consistent attendance and reports of compliance with HEP  PT notes that pts ROM is approaching full mobility with only minor tightness at end range  Strength deficits in the R shoulder remain primary concern and remaining cause for remaining pain in the shoulder  He notes improved tolerance to ADLs but remains challenged with sleeping/lying on the R shoulder and lifting anything > 5# with R UE  PT notes continuation of skilled therapy services with focus on further strengthening would be beneficial to patient in order to return to PLOF  Thank you for this referral    Impairments: activity intolerance, impaired physical strength, pain with function and poor posture   Other impairment: decreased flexibility, TTP along with soft tissue inflammation  Functional limitations: difficulty sleeping  Understanding of Dx/Px/POC: good   Prognosis: good    Goals  STGs:  1  Initiate and complete HEP with verbal cues  - met  2  Decrease R shoulder pain by > 25% in 4 weeks  - met  3  Improve R shoulder strength by 1/2 grade in 4 weeks  - ongoing   4  Improve R shoulder ROM by 15-20 degrees in 4 weeks  - met  LTGs:  1  Patient to be I with HEP in 12 weeks  2   Improve R shoulder ROM to WNL t/o in 12 weeks to improve function  3   Improve RUE strength to > or = to 4+ to 5/5 t/o in 12 weeks to improve function  4   Decrease R shoulder pain to < or = to 2-3/10 with activity in 12 weeks to improve function  5   Recreational performance in related activities is improved to PLOF in 12 weeks  6   Postural control is improved to maximal level of function in 12 weeks    7   Work performance is improved to PLOF in 12 weeks  8   ADL performance is improved to PLOF in 12 weeks  9   Patient to report improved sleep in 12 weeks  Plan  Plan details: Modalities and therapy interventions prn  Plan to continue with PT until his doctor appointment and to await any recommendations  Have instructed patient to phone doctor about getting an earlier appointment secondary to bruising and increased pain  Patient would benefit from: skilled physical therapy  Planned modality interventions: cryotherapy, thermotherapy: hydrocollator packs and unattended electrical stimulation  Planned therapy interventions: manual therapy, body mechanics training, behavior modification, neuromuscular re-education, patient education, postural training, self care, strengthening, stretching, therapeutic activities, therapeutic exercise, flexibility and home exercise program  Frequency: 3x week (2-3 times/week)  Duration in weeks: 6  Plan of Care beginning date: 2021  Plan of Care expiration date: 2021  Treatment plan discussed with: patient        Subjective Evaluation    History of Present Illness  Date of surgery: 2020  Mechanism of injury: surgery  Mechanism of injury: Pt notes that everything is getting better but still gets discomfort in the shoulder depending on certain movements  He will return to see MD again on 3/4/21  Quality of life: good    Pain  At best pain ratin  At worst pain ratin  Location: R Shoulder - anterior shoulder, top of shoulder, into upper arm    Denies N&T  Quality: discomfort and dull ache  Relieving factors: medications and ice    Social Support  Lives with: spouse    Employment status: not working (Currently off work -  )  Hand dominance: right    Treatments  Previous treatment: physical therapy, injection treatment and medication  Patient Goals  Patient goals for therapy: decreased pain, increased motion, increased strength, return to work and independence with ADLs/IADLs  Patient goal: "To help get full ROM and strength, to get more mobility, get full use of my arm, to reduce the pain "          Objective     Static Posture     Shoulders  Rounded  Observations     Right Shoulder  Positive for incision  Additional Observation Details  Three portal sites noted t/o R shoulder - well healed  Bruise noted today along distal upper arm, above elbow  Tenderness     Right Shoulder  Tenderness in the [facility] joint and acromion  Additional Tenderness Details  TTP around incision sites  Neurological Testing     Sensation     Shoulder   Left Shoulder   Intact: light touch    Right Shoulder   Intact: light touch    Active Range of Motion   Left Shoulder   Flexion: 180 degrees   Abduction: 180 degrees   External rotation 90°: 90 degrees   Internal rotation 90°: 60 degrees     Right Shoulder   Flexion: 160 degrees   Abduction: 160 degrees   External rotation 45°: 50 degrees   Internal rotation 45°: 50 degrees     Left Elbow   Flexion: WFL  Extension: WFL    Right Elbow   Flexion: WFL  Extension: WFL    Passive Range of Motion     Right Shoulder   Flexion: WFL  Abduction: WFL  External rotation 90°: WFL  Internal rotation 90°: WFL    Strength/Myotome Testing     Left Shoulder   Normal muscle strength    Right Shoulder     Planes of Motion   Flexion: 3+   Abduction: 3   External rotation at 90°: 3+   Internal rotation at 90°: 3+     Left Elbow   Normal strength    Additional Strength Details  R Elbow Strength NT today  Ambulation   Weight-Bearing Status   Assistive device used: none    Additional Weight-Bearing Status Details  No longer using sling - stopped about one week ago        Ambulation: Level Surfaces   Ambulation without assistive device: independent             Precautions:   Re-Eval DUE: 3/25/21      Manuals 2/11/21 2-15-21 2/18     R Shoulder  10 minutes   To tolerance 10' to liat NP                Neuro Re-Ed          MTP/LTP Green 1 x 20 each Green 1 x 20 ea Blue x 30                            Ther Ex  1-15-21      UBE Alt 10 minutes Alt 10' Alt 10'      Finger Ladder - Flex/Abd 1 x 3 each 1 x 3 each 1 x 3 ea      Wall slides 1 x 15 each 1 x 15 ea 1 x 15 ea      S/L ER 1 x 20  2 x 10 2# 2 x 10      S/L ABd 1 x 20 1 x 10 2# 2 x 10      Prone Flex 2 x 10 2 x 10 2 x 10      Prone Horiz Abd 2 x 10  2 x 10 2 x 10      Prone ext    2 x 10      TBand ER/IR          Jonathan ER w/TBand Green 2 x 10  Green  2 x 10      Standing FF/Abd to 90*  2# 1 x 10 ea 2# 1 x 10      SA punches    2# 2 x 10      Supine ABC's  X 1  X 1 2# x 1      Wall pushups         Ball on wall   Up/down; L/R  CW/CCW Red weighted ball 1 x 10 ea dir Red weighted ball  1 x 10 ea Red weighted ball  1 x 10 ea              Ther Activity                                Gait Training                                Modalities  2-15-21      CP prn Seated 10 minues Seated 10' Seated 10'

## 2021-02-22 ENCOUNTER — OFFICE VISIT (OUTPATIENT)
Dept: PHYSICAL THERAPY | Facility: HOME HEALTHCARE | Age: 51
End: 2021-02-22
Payer: COMMERCIAL

## 2021-02-22 DIAGNOSIS — M75.51 BURSITIS OF RIGHT SHOULDER: Primary | ICD-10-CM

## 2021-02-22 PROCEDURE — 97110 THERAPEUTIC EXERCISES: CPT

## 2021-02-22 PROCEDURE — 97140 MANUAL THERAPY 1/> REGIONS: CPT

## 2021-02-22 PROCEDURE — 97112 NEUROMUSCULAR REEDUCATION: CPT

## 2021-02-22 NOTE — PROGRESS NOTES
Daily Note     Today's date: 2021  Patient name: Aminta Gonzalez  : 1970  MRN: [id number]  Referring provider: Na Espitia MD  Dx:   Encounter Diagnosis     ICD-10-CM    1  Bursitis of right shoulder  M75 51                   Subjective: Pt reports no pain R shoulder  Objective: See treatment diary below      Assessment: Tolerated treatment well  No increased pain reported t/o session  End range tightness noted t/o R shoulder with stretching  Strength deficits still noted  Patient would benefit from continued PT      Plan: Continue per plan of care        Precautions:   Re-Eval DUE: 3/25/21      Manuals 2/11/21 2-15-21 2/18 2/22/21    R Shoulder  10 minutes   To tolerance 10' to liat NP 10 min to liat               Neuro Re-Ed          MTP/LTP Green 1 x 20 each Green 1 x 20 ea Blue x 30  Blue x 30                           Ther Ex  1-15-21      UBE Alt 10 minutes Alt 10' Alt 10'  Alt 10 min    Finger Ladder - Flex/Abd 1 x 3 each 1 x 3 each 1 x 3 ea  1 x 3 ea     Wall slides 1 x 15 each 1 x 15 ea 1 x 15 ea  1 x 15 ea     S/L ER 1 x 20  2 x 10 2# 2 x 10  2#  2 x 10     S/L ABd 1 x 20 1 x 10 2# 2 x 10  2# 2 x 10     Prone Flex 2 x 10 2 x 10 2 x 10  2 x 10     Prone Horiz Abd 2 x 10  2 x 10 2 x 10  2 x 10     Prone ext    2 x 10  2 x 10     TBand ER/IR          Jonathan ER w/TBand Green 2 x 10  Green  2 x 10  Green 2 x 10     Standing FF/Abd to 90*  2# 1 x 10 ea 2# 1 x 10  2# 1 x 10     SA punches    2# 2 x 10  2#  2 x 10     Supine ABC's  X 1  X 1 2# x 1  2# x 1     Wall pushups         Ball on wall   Up/down; L/R  CW/CCW Red weighted ball 1 x 10 ea dir Red weighted ball  1 x 10 ea Red weighted ball  1 x 10 ea  Red weighted ball 1 x 10 ea             Ther Activity                                Gait Training                                Modalities  2-15-21      CP prn Seated 10 minues Seated 10' Seated 10'  Seated 10 min

## 2021-02-25 ENCOUNTER — OFFICE VISIT (OUTPATIENT)
Dept: PHYSICAL THERAPY | Facility: HOME HEALTHCARE | Age: 51
End: 2021-02-25
Payer: COMMERCIAL

## 2021-02-25 DIAGNOSIS — M75.51 BURSITIS OF RIGHT SHOULDER: Primary | ICD-10-CM

## 2021-02-25 PROCEDURE — 97112 NEUROMUSCULAR REEDUCATION: CPT

## 2021-02-25 PROCEDURE — 97110 THERAPEUTIC EXERCISES: CPT

## 2021-02-25 PROCEDURE — 97140 MANUAL THERAPY 1/> REGIONS: CPT

## 2021-02-25 NOTE — PROGRESS NOTES
Daily Note     Today's date: 2021  Patient name: Geetha Jones  : 1970  MRN: [id number]  Referring provider: Alia Clark MD  Dx:   Encounter Diagnosis     ICD-10-CM    1  Bursitis of right shoulder  M75 51               Subjective: Pt reports his R shoulder is a little sore this morning and feels he may have slept on it wrong  Objective: See treatment diary below      Assessment: Tolerated treatment well  Progressed to wall push ups today  Pt reports 2/10 pain R shoulder with exercsie  Pt reports he has "pins and needles" feeling in his R hand while using UBE  End range tightness noted R shoudler with stretching  Patient would benefit from continued PT      Plan: Continue per plan of care        Precautions:   Re-Eval DUE: 3/25/21      Manuals 2/11/21 2-15-21 2/18 2/22/21 2/25/21   R Shoulder  10 minutes   To tolerance 10' to liat NP 10 min to liat 10 min to liat              Neuro Re-Ed          MTP/LTP Green 1 x 20 each Green 1 x 20 ea Blue x 30  Blue x 30  Blue x 30                          Ther Ex  1-15-21      UBE Alt 10 minutes Alt 10' Alt 10'  Alt 10 min Alt 10 min    Finger Ladder - Flex/Abd 1 x 3 each 1 x 3 each 1 x 3 ea  1 x 3 ea  1 x 3 ea    Wall slides 1 x 15 each 1 x 15 ea 1 x 15 ea  1 x 15 ea  1 x 15 ea    S/L ER 1 x 20  2 x 10 2# 2 x 10  2#  2 x 10  2# 2 x 10    S/L ABd 1 x 20 1 x 10 2# 2 x 10  2# 2 x 10  2# 2 x 10    Prone Flex 2 x 10 2 x 10 2 x 10  2 x 10  2 x 10    Prone Horiz Abd 2 x 10  2 x 10 2 x 10  2 x 10  2 x 10    Prone ext    2 x 10  2 x 10  2 x 10    TBand ER/IR          Jonathan ER w/TBand Green 2 x 10  Green  2 x 10  Green 2 x 10  Green 2 x 10    Standing FF/Abd to 90*  2# 1 x 10 ea 2# 1 x 10  2# 1 x 10  2# 1 X10   SA punches    2# 2 x 10  2#  2 x 10  2# 2 X 10   Supine ABC's  X 1  X 1 2# x 1  2# x 1  2# X 1    Wall pushups      1 x 15    Ball on wall   Up/down; L/R  CW/CCW Red weighted ball 1 x 10 ea dir Red weighted ball  1 x 10 ea Red weighted ball  1 x 10 ea  Red weighted ball 1 x 10 ea  Red weighted ball 1 x 10 ea            Ther Activity                                Gait Training                                Modalities  2-15-21      CP prn Seated 10 minues Seated 10' Seated 10'  Seated 10 min  Seated 10 min

## 2021-03-01 ENCOUNTER — APPOINTMENT (OUTPATIENT)
Dept: PHYSICAL THERAPY | Facility: HOME HEALTHCARE | Age: 51
End: 2021-03-01
Payer: COMMERCIAL

## 2021-03-04 ENCOUNTER — OFFICE VISIT (OUTPATIENT)
Dept: PHYSICAL THERAPY | Facility: HOME HEALTHCARE | Age: 51
End: 2021-03-04
Payer: COMMERCIAL

## 2021-03-04 DIAGNOSIS — M75.51 BURSITIS OF RIGHT SHOULDER: Primary | ICD-10-CM

## 2021-03-04 PROCEDURE — 97112 NEUROMUSCULAR REEDUCATION: CPT

## 2021-03-04 PROCEDURE — 97110 THERAPEUTIC EXERCISES: CPT

## 2021-03-04 PROCEDURE — 97140 MANUAL THERAPY 1/> REGIONS: CPT

## 2021-03-04 NOTE — PROGRESS NOTES
Daily Note     Today's date: 3/4/2021  Patient name: Peter Kee  : 1970  MRN: [id number]  Referring provider: Elijah Sullivan MD  Dx:   Encounter Diagnosis     ICD-10-CM    1  Bursitis of right shoulder  M75 51               Subjective: Pt reports his pain in his R shoulder is "slight" today  Pt reports he has an appt to see the Dr today  Objective: See treatment diary below      Assessment: Tolerated treatment well  No increased pain reported t/o session  End range tightness noted with stretching  Patient would benefit from continued PT      Plan: Continue per plan of care  will await any Dr recommendations        Precautions:   Re-Eval DUE: 3/25/21      Manuals 3/4/21 2-15-21 2/18 2/22/21 2/25/21   R Shoulder  10 minutes   To tolerance 10' to liat NP 10 min to liat 10 min to liat              Neuro Re-Ed          MTP/LTP Black  1 x 30 each Green 1 x 20 ea Blue x 30  Blue x 30  Blue x 30                          Ther Ex  1-15-21      UBE Alt 10 minutes Alt 10' Alt 10'  Alt 10 min Alt 10 min    Finger Ladder - Flex/Abd 1 x 3 each 1 x 3 each 1 x 3 ea  1 x 3 ea  1 x 3 ea    Wall slides 1 x 15 each 1 x 15 ea 1 x 15 ea  1 x 15 ea  1 x 15 ea    S/L ER 1 x 20  2# 2 x 10 2# 2 x 10  2#  2 x 10  2# 2 x 10    S/L ABd 1 x 20 2# 1 x 10 2# 2 x 10  2# 2 x 10  2# 2 x 10    Prone Flex 2 x 10  2 x 10 2 x 10  2 x 10  2 x 10    Prone Horiz Abd 2 x 10  2 x 10 2 x 10  2 x 10  2 x 10    Prone ext  2 x 10   2 x 10  2 x 10  2 x 10    TBand ER/IR          Jonathan ER w/TBand Green 2 x 10  Green  2 x 10  Green 2 x 10  Green 2 x 10    Standing FF/Abd to 90* 2# 1x 10  2# 1 x 10 ea 2# 1 x 10  2# 1 x 10  2# 1 X10   SA punches  2# 2 x 10   2# 2 x 10  2#  2 x 10  2# 2 X 10   Supine ABC's  X 1 2# X 1 2# x 1  2# x 1  2# X 1    Wall pushups  1 x 15     1 x 15    Ball on wall   Up/down; L/R  CW/CCW Red weighted ball 1 x 10 ea dir Red weighted ball  1 x 10 ea Red weighted ball  1 x 10 ea  Red weighted ball 1 x 10 ea  Red weighted ball 1 x 10 ea            Ther Activity                                Gait Training                                Modalities  2-15-21      CP prn Seated 10 minues Seated 10' Seated 10'  Seated 10 min  Seated 10 min

## 2021-03-08 ENCOUNTER — OFFICE VISIT (OUTPATIENT)
Dept: PHYSICAL THERAPY | Facility: HOME HEALTHCARE | Age: 51
End: 2021-03-08
Payer: COMMERCIAL

## 2021-03-08 DIAGNOSIS — M75.51 BURSITIS OF RIGHT SHOULDER: Primary | ICD-10-CM

## 2021-03-08 PROCEDURE — 97140 MANUAL THERAPY 1/> REGIONS: CPT

## 2021-03-08 PROCEDURE — 97110 THERAPEUTIC EXERCISES: CPT

## 2021-03-08 PROCEDURE — 97112 NEUROMUSCULAR REEDUCATION: CPT

## 2021-03-08 NOTE — PROGRESS NOTES
Daily Note     Today's date: 3/8/2021  Patient name: James Pham  : 1970  MRN: [id number]  Referring provider: Tania Johnson MD  Dx:   Encounter Diagnosis     ICD-10-CM    1  Bursitis of right shoulder  M75 51        Start Time: 1055          Subjective: I get some pain at R sh to Deltoid region when I stretch in the morning  I am able to do everything I need to do at home without difficulty  Objective: See treatment diary below    Assessment: Tolerated treatment well  Pt progressing consistently with strength and pain free ROM  Added R sh adduction stretch 2* pt report of inability to reach L sh  Also increased wt with a few ex and added R UE t-band IR/ER without incident  Minimal vc's needed  Pt with minimal R sh tightness and no c/o pain at end with CP  Patient would benefit from continued PT    Plan: Continue per plan of care        Precautions:   Re-Eval DUE: 3/25/21      Manuals 3/4/21 3-8-21 2/18 2/22/21 2/25/21   R Shoulder  10 minutes   To tolerance 10' to liat  Added sh add stretch NP 10 min to liat 10 min to liat              Neuro Re-Ed          MTP/LTP Black  1 x 30 each Black 1 x 30 ea Blue x 30  Blue x 30  Blue x 30                          Ther Ex  3-8-21      UBE Alt 10 minutes Alt 10' Alt 10'  Alt 10 min Alt 10 min    Finger Ladder - Flex/Abd 1 x 3 each DC 1 x 3 ea  1 x 3 ea  1 x 3 ea    Wall slides 1 x 15 each 1 x 15 ea 1 x 15 ea  1 x 15 ea  1 x 15 ea    S/L ER 1 x 20  2# 3# 2 x 10 2# 2 x 10  2#  2 x 10  2# 2 x 10    S/L ABd 1 x 20 2# 3# 1 x 20 2# 2 x 10  2# 2 x 10  2# 2 x 10    Prone Flex 2 x 10  2 x 10 2 x 10  2 x 10  2 x 10    Prone Horiz Abd 2 x 10  2 x 10 2 x 10  2 x 10  2 x 10    Prone ext  2 x 10  2 x 10 2 x 10  2 x 10  2 x 10    TBand ER/IR    Blue 1 x 10 ea      Jonathan ER w/TBand Green 2 x 10  DC  Green 2 x 10  Green 2 x 10    Standing FF/Abd to 90* 2# 1x 10  2# 1 x 10 ea 2# 1 x 10  2# 1 x 10  2# 1 X10   SA punches  2# 2 x 10  3# 2 x 10 2# 2 x 10  2#  2 x 10  2# 2 X 10 Supine ABC's  X 1 2# X 1  2# 2# x 1  2# x 1  2# X 1    Wall pushups  1 x 15  1 x 20   1 x 15    Ball on wall   Up/down; L/R  CW/CCW Red weighted ball 1 x 10 ea dir Red weighted ball  1 x 10 ea Red weighted ball  1 x 10 ea  Red weighted ball 1 x 10 ea  Red weighted ball 1 x 10 ea            Ther Activity                                Gait Training                                Modalities  3-8-21      CP prn Seated 10 minues Seated 10' Seated 10'  Seated 10 min  Seated 10 min

## 2021-03-11 ENCOUNTER — APPOINTMENT (OUTPATIENT)
Dept: PHYSICAL THERAPY | Facility: HOME HEALTHCARE | Age: 51
End: 2021-03-11
Payer: COMMERCIAL

## 2021-03-16 ENCOUNTER — APPOINTMENT (OUTPATIENT)
Dept: PHYSICAL THERAPY | Facility: HOME HEALTHCARE | Age: 51
End: 2021-03-16
Payer: COMMERCIAL

## 2021-03-18 ENCOUNTER — OFFICE VISIT (OUTPATIENT)
Dept: PHYSICAL THERAPY | Facility: HOME HEALTHCARE | Age: 51
End: 2021-03-18
Payer: COMMERCIAL

## 2021-03-18 DIAGNOSIS — M75.51 BURSITIS OF RIGHT SHOULDER: Primary | ICD-10-CM

## 2021-03-18 PROCEDURE — 97140 MANUAL THERAPY 1/> REGIONS: CPT

## 2021-03-18 PROCEDURE — 97110 THERAPEUTIC EXERCISES: CPT

## 2021-03-18 PROCEDURE — 97112 NEUROMUSCULAR REEDUCATION: CPT

## 2021-03-18 NOTE — PROGRESS NOTES
Daily Note     Today's date: 3/18/2021  Patient name: Med Philip  : 1970  MRN: [id number]  Referring provider: Israel Graham MD  Dx:   Encounter Diagnosis     ICD-10-CM    1  Bursitis of right shoulder  M75 51               Subjective:  Pt reports his R shoulder is a little stiff and has 1/10 pain in his R shoulder  Objective: See treatment diary below      Assessment: Tolerated treatment well  No increased pain reported R shoulder  t/o session  Increased weight with supine ABC's today  ROM and strength improving since [facility]  Patient would benefit from continued PT      Plan: Continue per plan of care        Precautions:   Re-Eval DUE: 3/25/21      Manuals 3/4/21 3-8-21 3/18/21 2/22/21 2/25/21   R Shoulder  10 minutes   To tolerance 10' to liat  Added sh add stretch 10 min to liat  10 min to liat 10 min to liat              Neuro Re-Ed          MTP/LTP Black  1 x 30 each Black 1 x 30 ea Blue x 30  Ea  Blue x 30  Blue x 30     Ball on wall      Red weighted ball   1 x 10 ea dir                 Ther Ex  3-8-21      UBE Alt 10 minutes Alt 10' Alt 10'  Alt 10 min Alt 10 min    Wall slides 1 x 15 each 1 x 15 ea 1 x 15 ea  1 x 15 ea  1 x 15 ea    S/L ER 1 x 20  2# 3# 2 x 10 3# 2 x 10  2#  2 x 10  2# 2 x 10    S/L ABd 1 x 20 2# 3# 1 x 20 3# 2 x 10  2# 2 x 10  2# 2 x 10    Prone Flex 2 x 10  2 x 10 2 x 10  2 x 10  2 x 10    Prone Horiz Abd 2 x 10  2 x 10 2 x 10  2 x 10  2 x 10    Prone ext  2 x 10  2 x 10 2 x 10  2 x 10  2 x 10    TBand ER/IR    Blue 1 x 10 ea Blue 1 x 10 ea      Standing FF/Abd to 90* 2# 1x 10  2# 1 x 10 ea 2# 1 x 10  2# 1 x 10  2# 1 X10   SA punches  2# 2 x 10  3# 2 x 10 3# 2 x 10  2#  2 x 10  2# 2 X 10   Supine ABC's  X 1 2# X 1  2# 3# x 1  2# x 1  2# X 1    Wall pushups  1 x 15  1 x 20 1 x 20   1 x 15            Ther Activity                                Gait Training                                Modalities  3-8-21      CP prn Seated 10 minues Seated 10' Seated 10'  Seated 10 min Seated 10 min

## 2021-03-22 ENCOUNTER — OFFICE VISIT (OUTPATIENT)
Dept: PHYSICAL THERAPY | Facility: HOME HEALTHCARE | Age: 51
End: 2021-03-22
Payer: COMMERCIAL

## 2021-03-22 DIAGNOSIS — M75.51 BURSITIS OF RIGHT SHOULDER: Primary | ICD-10-CM

## 2021-03-22 PROCEDURE — 97110 THERAPEUTIC EXERCISES: CPT

## 2021-03-22 PROCEDURE — 97112 NEUROMUSCULAR REEDUCATION: CPT

## 2021-03-22 PROCEDURE — 97140 MANUAL THERAPY 1/> REGIONS: CPT

## 2021-03-22 NOTE — PROGRESS NOTES
Daily Note     Today's date: 3/22/2021  Patient name: Terry Randall  : 1970  MRN: [id number]  Referring provider: Candelario Felix MD  Dx:   Encounter Diagnosis     ICD-10-CM    1  Bursitis of right shoulder  M75 51                   Subjective: I have some tightness at my sh in the morning but otherwise I feel good with strength and ROM  Objective: See treatment diary below    Assessment: Tolerated treatment well  Pt with minimal vc's needed and progressing well toward I HEP  Minimal end range tightness noted with MT with IR being most limited  Added towel IR stretch to program and instructed for home  Pt declined CP to home  Patient would benefit from continued PT    Plan: Continue per plan of care        Precautions:   Re-Eval DUE: 3/25/21      Manuals 3/4/21 3-8-21 3/18/21 3-22-21 2/25/21   R Shoulder  10 minutes   To tolerance 10' to liat  Added sh add stretch 10 min to liat  10' to liat 10 min to liat              Neuro Re-Ed          MTP/LTP Black  1 x 30 each Black 1 x 30 ea Blue x 30  Ea  Blue x 30  Blue x 30     Ball on wall      Red weighted ball   1 x 10 ea dir  Red 2 2# ball  1 x 10 ea               Ther Ex  3-8-21  3-22-21    UBE Alt 10 minutes Alt 10' Alt 10'  Alt 10' Alt 10 min    Wall slides 1 x 15 each 1 x 15 ea 1 x 15 ea  1 x 15 ea  1 x 15 ea    Towel IR stretch    15" x 4    S/L ER 1 x 20  2# 3# 2 x 10 3# 2 x 10  3#  2 x 10  2# 2 x 10    S/L ABd 1 x 20 2# 3# 1 x 20 3# 2 x 10  3# 2 x 10  2# 2 x 10    Prone Flex 2 x 10  2 x 10 2 x 10  2 x 10  2 x 10    Prone Horiz Abd 2 x 10  2 x 10 2 x 10  2 x 10  2 x 10    Prone ext  2 x 10  2 x 10 2 x 10  2 x 10  2 x 10    TBand ER/IR    Blue 1 x 10 ea Blue 1 x 10 ea  Blue 1 x 15    Standing FF/Abd to 90* 2# 1x 10  2# 1 x 10 ea 2# 1 x 20  2# 1 x 20  2# 1 X10   SA punches  2# 2 x 10  3# 2 x 10 3# 2 x 10  3#  2 x 10  2# 2 X 10   Supine ABC's  X 1 2# X 1  2# 3# x 1  3# x 1  2# X 1    Wall pushups  1 x 15  1 x 20 1 x 20  1 x 20 1 x 15            Ther Activity                                Gait Training                                Modalities  3-8-21  3-22-21    CP prn Seated 10 minues Seated 10' Seated 10'  Seated 10 min  Seated 10 min

## 2021-03-25 ENCOUNTER — APPOINTMENT (OUTPATIENT)
Dept: PHYSICAL THERAPY | Facility: HOME HEALTHCARE | Age: 51
End: 2021-03-25
Payer: COMMERCIAL

## 2021-03-25 ENCOUNTER — OFFICE VISIT (OUTPATIENT)
Dept: PHYSICAL THERAPY | Facility: HOME HEALTHCARE | Age: 51
End: 2021-03-25
Payer: COMMERCIAL

## 2021-03-25 DIAGNOSIS — M75.51 BURSITIS OF RIGHT SHOULDER: Primary | ICD-10-CM

## 2021-03-25 PROCEDURE — 97110 THERAPEUTIC EXERCISES: CPT

## 2021-03-25 PROCEDURE — 97140 MANUAL THERAPY 1/> REGIONS: CPT

## 2021-03-25 NOTE — PROGRESS NOTES
Daily Note     Today's date: 3/25/2021  Patient name: Marjorie Rothman  : 1970  MRN: [id number]  Referring provider: Zoraida Millan MD  Dx:   Encounter Diagnosis     ICD-10-CM    1  Bursitis of right shoulder  M75 51        Start Time: 1700          Subjective: I feel pretty good  Objective: See treatment diary below    Assessment: Tolerated treatment well  Added wt with prone ex with good liat and no c/o pain  Pt does report muscle fatigue t/o session  DC MT 2* pt with full PROM and minimal end range tightness  Pt  reports good understanding and consistency with HEP  Pt denied pain at end  Patient exhibited good technique with therapeutic exercises and would benefit from continued PT    Plan: Continue per plan of care        Precautions:   Re-Eval DUE: 3/25/21      Manuals 3/4/21 3-8-21 3/18/21 3-22-21 3-25-21   R Shoulder  10 minutes   To tolerance 10' to liat  Added sh add stretch 10 min to liat  10' to liat 10'  DC NV              Neuro Re-Ed          MTP/LTP Black  1 x 30 each Black 1 x 30 ea Blue x 30  Ea  Blue x 30  Blue x 30     Ball on wall      Red weighted ball   1 x 10 ea dir  Red 2 2# ball  1 x 10 ea Red 2 2#  1 x 10 ea              Ther Ex  3-8-21  3-22-21 3-25-21   UBE Alt 10 minutes Alt 10' Alt 10'  Alt 10' Alt 10 min    Wall slides 1 x 15 each 1 x 15 ea 1 x 15 ea  1 x 15 ea  1 x 15 ea    Towel IR stretch    15" x 4 15" x 4    S/L ER 1 x 20  2# 3# 2 x 10 3# 2 x 10  3#  2 x 10  3# 2 x 10    S/L ABd 1 x 20 2# 3# 1 x 20 3# 2 x 10  3# 2 x 10  3# 2 x 10    Prone Flex 2 x 10  2 x 10 2 x 10  2 x 10  2# 2 x 10    Prone Horiz Abd 2 x 10  2 x 10 2 x 10  2 x 10  2# 2 x 10    Prone ext  2 x 10  2 x 10 2 x 10  2 x 10  2# 2 x 10    TBand ER/IR    Blue 1 x 10 ea Blue 1 x 10 ea  Blue 1 x 15    Standing FF/Abd to 90* 2# 1x 10  2# 1 x 10 ea 2# 1 x 20  2# 1 x 20  2# 1 x 20   SA punches  2# 2 x 10  3# 2 x 10 3# 2 x 10  3#  2 x 10  3# 2 X 10   Supine ABC's  X 1 2# X 1  2# 3# x 1  3# x 1  3#  X 1    Wall pushups 1 x 15  1 x 20 1 x 20  1 x 20 1 x 20            Ther Activity                                Gait Training                                Modalities  3-8-21  3-22-21 3-25-21   CP prn Seated 10 minues Seated 10' Seated 10'  Seated 10 min  Seated 10'

## 2021-03-30 ENCOUNTER — OFFICE VISIT (OUTPATIENT)
Dept: PHYSICAL THERAPY | Facility: HOME HEALTHCARE | Age: 51
End: 2021-03-30
Payer: COMMERCIAL

## 2021-03-30 DIAGNOSIS — M75.51 BURSITIS OF RIGHT SHOULDER: Primary | ICD-10-CM

## 2021-03-30 PROCEDURE — 97110 THERAPEUTIC EXERCISES: CPT

## 2021-03-30 NOTE — PROGRESS NOTES
Daily Note     Today's date: 3/30/2021  Patient name: Barbara Alvarez  : 1970  MRN: [id number]  Referring provider: Breezy Melara MD  Dx:   Encounter Diagnosis     ICD-10-CM    1  Bursitis of right shoulder  M75 51                   Subjective: I notice I have more pain sometimes at night and I have occasional times when my hand falls asleep when I am sleeping  It might just be a positional thing  Objective: See treatment diary below    Assessment: Added doorway stretch, Matrix MTP/LTP and prone 90/90 to program to challenge pt  Pt liat well and without c/o pain  He did report some muscle fatigue during prone 90/90  Patient would benefit from continued PT    Plan: Continue per plan of care        Precautions:   Re-Eval DUE: 3/25/21      Manuals 3-30-21 3-8-21 3/18/21 3-22-21 3-25-21   R Shoulder  DC 10' to liat  Added sh add stretch 10 min to liat  10' to liat 10'  DC NV              Neuro Re-Ed          MTP/LTP Matrix 37 5#   1 x 15 ea Black 1 x 30 ea Blue x 30  Ea  Blue x 30  Blue x 30     Ball on wall   Red 2 2# ball  1 x 10 ea   Red weighted ball   1 x 10 ea dir  Red 2 2# ball  1 x 10 ea Red 2 2#  1 x 10 ea              Ther Ex 3-30-21 3-8-21  3-22-21 3-25-21   UBE Alt 10 minutes Alt 10' Alt 10'  Alt 10' Alt 10 min    Doorway stretch 90* & 145*  15" x 3 ea       Wall slides 1 x 15 each 1 x 15 ea 1 x 15 ea  1 x 15 ea  1 x 15 ea    Towel IR stretch 15" x 4   15" x 4 15" x 4    S/L ER 1 x 20  3# 3# 2 x 10 3# 2 x 10  3#  2 x 10  3# 2 x 10    S/L ABd 1 x 20 3# 3# 1 x 20 3# 2 x 10  3# 2 x 10  3# 2 x 10    Prone Flex,abd,HA 2 x 10 3# 2 x 10 2 x 10  2 x 10  2# 2 x 10    Prone 90/90 1 x 10  3"       Standing FF/Abd to 90* 2# 1x 20  2# 1 x 10 ea 2# 1 x 20  2# 1 x 20  2# 1 x 20   SA punches  3# 2 x 10  3# 2 x 10 3# 2 x 10  3#  2 x 10  3# 2 X 10   Supine ABC's  X 1 3# X 1  2# 3# x 1  3# x 1  3#  X 1    Wall pushups  1 x 20  1 x 20 1 x 20  1 x 20 1 x 20            Ther Activity                                Gait Training                                Modalities 3-30-21 3-8-21  3-22-21 3-25-21   CP prn Home Seated 10' Seated 10'  Seated 10 min  Seated 10'

## 2021-04-01 ENCOUNTER — OFFICE VISIT (OUTPATIENT)
Dept: PHYSICAL THERAPY | Facility: HOME HEALTHCARE | Age: 51
End: 2021-04-01
Payer: COMMERCIAL

## 2021-04-01 DIAGNOSIS — M75.51 BURSITIS OF RIGHT SHOULDER: Primary | ICD-10-CM

## 2021-04-01 PROCEDURE — 97110 THERAPEUTIC EXERCISES: CPT

## 2021-04-01 NOTE — PROGRESS NOTES
Daily Note     Today's date: 2021  Patient name: Luna Clark  : 1970  MRN: [id number]  Referring provider: Miranda Rucker MD  Dx:   Encounter Diagnosis     ICD-10-CM    1  Bursitis of right shoulder  M75 51                   Subjective: Pt reports he have a little pain R shoulder  Objective: See treatment diary below      Assessment: Tolerated treatment well  No increased pain reported R shoulder t/o session  See DC summary for assessment  Patient exhibited good technique with therapeutic exercises  Plan: Pt to be DC from PT        Precautions:   Re-Eval DUE: 3/25/21      Manuals 3-30-21 4/1/21 3/18/21 3-22-21 3-25-21   R Shoulder  DC DC 10 min to liat  10' to liat 10'  DC NV              Neuro Re-Ed          MTP/LTP Matrix 37 5#   1 x 15 ea Matrix 37 5#   1 x 15 ea  Blue x 30  Ea  Blue x 30  Blue x 30     Ball on wall   Red 2 2# ball  1 x 10 ea  red 2 2# ball   1 x 10 ea  Red weighted ball   1 x 10 ea dir  Red 2 2# ball  1 x 10 ea Red 2 2#  1 x 10 ea              Ther Ex 3-30-21   3-22-21 3-25-21   UBE Alt 10 minutes Alt 10' Alt 10'  Alt 10' Alt 10 min    Doorway stretch 90* & 145*  15" x 3 ea 90* & 145*  15" x 3 ea       Wall slides 1 x 15 each  1 x 15 ea  1 x 15 ea  1 x 15 ea    Towel IR stretch 15" x 4 15" x 4   15" x 4 15" x 4    S/L ER 1 x 20  3# 3# 2 x 10 3# 2 x 10  3#  2 x 10  3# 2 x 10    S/L ABd 1 x 20 3# 3# 1 x 20 3# 2 x 10  3# 2 x 10  3# 2 x 10    Prone Flex,abd,HA 2 x 10 3# 2 x 10 3#  2 x 10  2 x 10  2# 2 x 10    Prone 90/90 1 x 10  3" 1 x 10 3"      Standing FF/Abd to 90* 2# 1x 20  2# 1 x 20 ea 2# 1 x 20  2# 1 x 20  2# 1 x 20   SA punches  3# 2 x 10  3# 2 x 10 3# 2 x 10  3#  2 x 10  3# 2 X 10   Supine ABC's  X 1 3# X 1  3# 3# x 1  3# x 1  3#  X 1    Wall pushups  1 x 20  1 x 20 1 x 20  1 x 20 1 x 20            Ther Activity                                Gait Training                                Modalities 3-30-21   3-22-21 3-25-21   CP prn Home Seated 10 min  Seated 10' Seated 10 min  Seated 10'

## 2021-04-06 NOTE — PROGRESS NOTES
PT Discharge    Today's date: 2021  Patient name: Zaira Smith  : 1970  MRN: [id number]  Referring provider: Rhys Payan MD  Dx:   Encounter Diagnosis     ICD-10-CM    1  Bursitis of right shoulder  M75 51        Start Time: 1700  Stop Time: 0956  Total time in clinic (min): 48 minutes    Assessment  Assessment details: The patient has been seen in PT x 4 months  Pt is now demonstrating shoulder ROM and strength WFL  He notes minimal to no pain in the shoulder and has returned to daily household activities  Pt has also returned to work and notes no significant issues with work activities  He has met outlined goals and will be D/C from OPPT at this time  Thank you for this referral    Understanding of Dx/Px/POC: good   Prognosis: good    Goals  STGs:  1  Initiate and complete HEP with verbal cues  - met  2  Decrease R shoulder pain by > 25% in 4 weeks  - met  3  Improve R shoulder strength by 1/2 grade in 4 weeks  - met  4  Improve R shoulder ROM by 15-20 degrees in 4 weeks  - met  LTGs:  1  Patient to be I with HEP in 12 weeks  - met  2  Improve R shoulder ROM to WNL t/o in 12 weeks to improve function  - met  3  Improve RUE strength to > or = to 4+ to 5/5 t/o in 12 weeks to improve function  - met  4  Decrease R shoulder pain to < or = to 2-3/10 with activity in 12 weeks to improve function  - met  5  Recreational performance in related activities is improved to PLOF in 12 weeks  - met  6  Postural control is improved to maximal level of function in 12 weeks  - met  7  Work performance is improved to PLOF in 12 weeks  - met  8  ADL performance is improved to PLOF in 12 weeks  - met  9  Patient to report improved sleep in 12 weeks    - met      Plan  Plan details: Pt to be D/C from OPPT   Frequency: 3x week (2-3 times/week)  Duration in weeks: 6  Plan of Care beginning date: 2021  Plan of Care expiration date: 2021  Treatment plan discussed with: patient        Subjective Evaluation    History of Present Illness  Date of surgery: 2020  Mechanism of injury: surgery  Mechanism of injury: Pt notes that everything is getting better but still gets discomfort in the shoulder depending on certain movements  He will return to see MD again on 3/4/21  Quality of life: good    Pain  At best pain ratin  At worst pain ratin  Location: R Shoulder - anterior shoulder, top of shoulder, into upper arm  Denies N&T  Quality: discomfort and dull ache  Relieving factors: medications and ice    Social Support  Lives with: spouse    Employment status: not working (Currently off work -  )  Hand dominance: right    Treatments  Previous treatment: physical therapy, injection treatment and medication  Patient Goals  Patient goals for therapy: decreased pain, increased motion, increased strength, return to work and independence with ADLs/IADLs  Patient goal: "To help get full ROM and strength, to get more mobility, get full use of my arm, to reduce the pain "          Objective     Static Posture     Shoulders  Rounded  Observations     Right Shoulder  Positive for incision  Additional Observation Details  Three portal sites noted t/o R shoulder - well healed  Bruise noted today along distal upper arm, above elbow  Tenderness     Right Shoulder  No tenderness in the [facility] joint and acromion       Neurological Testing     Sensation     Shoulder   Left Shoulder   Intact: light touch    Right Shoulder   Intact: light touch    Active Range of Motion   Left Shoulder   Flexion: 180 degrees   Abduction: 180 degrees   External rotation 90°: 90 degrees   Internal rotation 90°: 60 degrees     Right Shoulder   Flexion: 170 degrees   Abduction: 170 degrees   External rotation 45°: 60 degrees   Internal rotation 45°: 60 degrees     Left Elbow   Flexion: WFL  Extension: WFL    Right Elbow   Flexion: WFL  Extension: WFL    Passive Range of Motion     Right Shoulder   Flexion: WFL  Abduction: WFL  External rotation 90°: WFL  Internal rotation 90°: WFL    Strength/Myotome Testing     Left Shoulder   Normal muscle strength    Right Shoulder     Planes of Motion   Flexion: 4+   Abduction: 4+   External rotation at 90°: 4+   Internal rotation at 90°: 4+     Left Elbow   Normal strength    Additional Strength Details  R Elbow Strength NT today        Ambulation   Weight-Bearing Status   Assistive device used: none    Additional Weight-Bearing Status Details       Ambulation: Level Surfaces   Ambulation without assistive device: independent

## 2021-06-15 ENCOUNTER — TRANSCRIBE ORDERS (OUTPATIENT)
Dept: LAB | Facility: CLINIC | Age: 51
End: 2021-06-15

## 2021-06-15 ENCOUNTER — APPOINTMENT (OUTPATIENT)
Dept: LAB | Facility: CLINIC | Age: 51
End: 2021-06-15
Payer: COMMERCIAL

## 2021-06-15 DIAGNOSIS — I10 HYPERTENSION, UNSPECIFIED TYPE: ICD-10-CM

## 2021-06-15 DIAGNOSIS — I10 HYPERTENSION, UNSPECIFIED TYPE: Primary | ICD-10-CM

## 2021-06-15 DIAGNOSIS — E61.1 IRON DEFICIENCY: ICD-10-CM

## 2021-06-15 LAB
25(OH)D3 SERPL-MCNC: 20.4 NG/ML (ref 30–100)
ALBUMIN SERPL BCP-MCNC: 4 G/DL (ref 3.5–5)
ALP SERPL-CCNC: 65 U/L (ref 46–116)
ALT SERPL W P-5'-P-CCNC: 55 U/L (ref 12–78)
ANION GAP SERPL CALCULATED.3IONS-SCNC: 4 MMOL/L (ref 4–13)
AST SERPL W P-5'-P-CCNC: 48 U/L (ref 5–45)
BASOPHILS # BLD AUTO: 0.09 THOUSANDS/ΜL (ref 0–0.1)
BASOPHILS NFR BLD AUTO: 1 % (ref 0–1)
BILIRUB SERPL-MCNC: 0.42 MG/DL (ref 0.2–1)
BUN SERPL-MCNC: 13 MG/DL (ref 5–25)
CALCIUM SERPL-MCNC: 8.7 MG/DL (ref 8.3–10.1)
CHLORIDE SERPL-SCNC: 105 MMOL/L (ref 100–108)
CHOLEST SERPL-MCNC: 197 MG/DL (ref 50–200)
CO2 SERPL-SCNC: 29 MMOL/L (ref 21–32)
CREAT SERPL-MCNC: 0.71 MG/DL (ref 0.6–1.3)
EOSINOPHIL # BLD AUTO: 0.17 THOUSAND/ΜL (ref 0–0.61)
EOSINOPHIL NFR BLD AUTO: 2 % (ref 0–6)
ERYTHROCYTE [DISTWIDTH] IN BLOOD BY AUTOMATED COUNT: 14 % (ref 11.6–15.1)
GFR SERPL CREATININE-BSD FRML MDRD: 111 ML/MIN/1.73SQ M
GLUCOSE P FAST SERPL-MCNC: 97 MG/DL (ref 65–99)
HCT VFR BLD AUTO: 38.1 % (ref 36.5–49.3)
HDLC SERPL-MCNC: 69 MG/DL
HGB BLD-MCNC: 12.4 G/DL (ref 12–17)
IMM GRANULOCYTES # BLD AUTO: 0.01 THOUSAND/UL (ref 0–0.2)
IMM GRANULOCYTES NFR BLD AUTO: 0 % (ref 0–2)
IRON SERPL-MCNC: 50 UG/DL (ref 65–175)
LDLC SERPL CALC-MCNC: 108 MG/DL (ref 0–100)
LYMPHOCYTES # BLD AUTO: 2.18 THOUSANDS/ΜL (ref 0.6–4.47)
LYMPHOCYTES NFR BLD AUTO: 31 % (ref 14–44)
MCH RBC QN AUTO: 28.6 PG (ref 26.8–34.3)
MCHC RBC AUTO-ENTMCNC: 32.5 G/DL (ref 31.4–37.4)
MCV RBC AUTO: 88 FL (ref 82–98)
MONOCYTES # BLD AUTO: 0.73 THOUSAND/ΜL (ref 0.17–1.22)
MONOCYTES NFR BLD AUTO: 10 % (ref 4–12)
NEUTROPHILS # BLD AUTO: 3.92 THOUSANDS/ΜL (ref 1.85–7.62)
NEUTS SEG NFR BLD AUTO: 56 % (ref 43–75)
NONHDLC SERPL-MCNC: 128 MG/DL
NRBC BLD AUTO-RTO: 0 /100 WBCS
PLATELET # BLD AUTO: 359 THOUSANDS/UL (ref 149–390)
PMV BLD AUTO: 10 FL (ref 8.9–12.7)
POTASSIUM SERPL-SCNC: 4.6 MMOL/L (ref 3.5–5.3)
PROT SERPL-MCNC: 6.9 G/DL (ref 6.4–8.2)
RBC # BLD AUTO: 4.34 MILLION/UL (ref 3.88–5.62)
SODIUM SERPL-SCNC: 138 MMOL/L (ref 136–145)
T4 FREE SERPL-MCNC: 0.83 NG/DL (ref 0.76–1.46)
TRIGL SERPL-MCNC: 100 MG/DL
TSH SERPL DL<=0.05 MIU/L-ACNC: 3.67 UIU/ML (ref 0.36–3.74)
WBC # BLD AUTO: 7.1 THOUSAND/UL (ref 4.31–10.16)

## 2021-06-15 PROCEDURE — 83540 ASSAY OF IRON: CPT

## 2021-06-15 PROCEDURE — 81001 URINALYSIS AUTO W/SCOPE: CPT

## 2021-06-15 PROCEDURE — 84439 ASSAY OF FREE THYROXINE: CPT

## 2021-06-15 PROCEDURE — 84443 ASSAY THYROID STIM HORMONE: CPT

## 2021-06-15 PROCEDURE — 82306 VITAMIN D 25 HYDROXY: CPT

## 2021-06-15 PROCEDURE — 85025 COMPLETE CBC W/AUTO DIFF WBC: CPT

## 2021-06-15 PROCEDURE — 80053 COMPREHEN METABOLIC PANEL: CPT

## 2021-06-15 PROCEDURE — 80061 LIPID PANEL: CPT

## 2021-06-15 PROCEDURE — 36415 COLL VENOUS BLD VENIPUNCTURE: CPT

## 2021-06-16 LAB
BACTERIA UR QL AUTO: NORMAL /HPF
BILIRUB UR QL STRIP: NEGATIVE
CLARITY UR: CLEAR
COLOR UR: YELLOW
GLUCOSE UR STRIP-MCNC: NEGATIVE MG/DL
HGB UR QL STRIP.AUTO: NEGATIVE
HYALINE CASTS #/AREA URNS LPF: NORMAL /LPF
KETONES UR STRIP-MCNC: NEGATIVE MG/DL
LEUKOCYTE ESTERASE UR QL STRIP: NEGATIVE
NITRITE UR QL STRIP: NEGATIVE
NON-SQ EPI CELLS URNS QL MICRO: NORMAL /HPF
PH UR STRIP.AUTO: 6 [PH]
PROT UR STRIP-MCNC: NEGATIVE MG/DL
RBC #/AREA URNS AUTO: NORMAL /HPF
SP GR UR STRIP.AUTO: 1.01 (ref 1–1.03)
UROBILINOGEN UR QL STRIP.AUTO: 0.2 E.U./DL
WBC #/AREA URNS AUTO: NORMAL /HPF

## 2021-12-24 NOTE — PROGRESS NOTES
Daily Note     Today's date: 2018  Patient name: Jared Severs  : 1970  MRN: [id number]  Referring provider: Lily Varma MD  Dx:   Encounter Diagnosis     ICD-10-CM    1  Lateral epicondylitis of left elbow M77 12                   Subjective: Patient c/o increased L elbow pain during the night  Presents with increased edema L lateral elbow today  Objective: See treatment diary below      Assessment: Tolerated treatment well  Patient would benefit from continued PT to decrease pain and edema L elbow  Plan: Continue per plan of care       Precautions:na    Daily Treatment Diary     L lateral epicondyle  Manual              Retrograde massage  x13'                                                                    Exercise Diary                                                                                                                                                                                                                                                                                      Modalities              Pulsed US @1 2  w/cm2 x10'            CP  x10 Home Oxygen Qualifying Test       Patient name: Bhavin Guerrero        : 1956   Date of Test:  2021  Diagnosis:      Home Oxygen Test:    **Medicare Guidelines require item(s) 1-5 on all ambulatory patients or 1 and 2 on non-ambulatory patients  1   Baseline SPO2 on Room Air at rest 92 %  2   SPO2 during exercise on Room Air 89 %  During exercise monitor SpO2  If SPO2 increases >=89% with ambulation do not add supplemental             oxygen  If <= 88% on room air add O2 via NC and titrate patient  Patient must be ambulated with O2 and titrated to > 88% with exertion  3   SPO2 on Oxygen at rest na % na lpm     4   SPO2 during exercise on Oxygen  na% a liter flow of na lpm     5   Exercise performed:          walking, duration 6 (min)          []  Supplemental Home Oxygen is indicated  [x]  Client does not qualify for home oxygen        Respiratory Additional Notes- yamila Allison, RT

## 2022-01-07 ENCOUNTER — HOSPITAL ENCOUNTER (OUTPATIENT)
Dept: MRI IMAGING | Facility: HOSPITAL | Age: 52
Discharge: HOME | End: 2022-01-07
Payer: COMMERCIAL

## 2022-01-07 DIAGNOSIS — M54.17 LUMBOSACRAL RADICULITIS: ICD-10-CM

## 2022-01-07 DIAGNOSIS — M54.17 L-S RADICULOPATHY: ICD-10-CM

## 2022-01-07 PROCEDURE — G1004 CDSM NDSC: HCPCS

## 2022-01-07 PROCEDURE — 72148 MRI LUMBAR SPINE W/O DYE: CPT

## 2022-01-27 PROCEDURE — 99283 EMERGENCY DEPT VISIT LOW MDM: CPT

## 2022-01-28 ENCOUNTER — APPOINTMENT (EMERGENCY)
Dept: RADIOLOGY | Facility: HOSPITAL | Age: 52
End: 2022-01-28
Payer: OTHER MISCELLANEOUS

## 2022-01-28 ENCOUNTER — OCCMED (OUTPATIENT)
Dept: URGENT CARE | Facility: CLINIC | Age: 52
End: 2022-01-28
Payer: OTHER MISCELLANEOUS

## 2022-01-28 ENCOUNTER — HOSPITAL ENCOUNTER (EMERGENCY)
Facility: HOSPITAL | Age: 52
Discharge: HOME | End: 2022-01-28
Attending: EMERGENCY MEDICINE | Admitting: EMERGENCY MEDICINE
Payer: OTHER MISCELLANEOUS

## 2022-01-28 VITALS
OXYGEN SATURATION: 98 % | SYSTOLIC BLOOD PRESSURE: 160 MMHG | HEIGHT: 70 IN | HEART RATE: 77 BPM | DIASTOLIC BLOOD PRESSURE: 90 MMHG | WEIGHT: 244.71 LBS | TEMPERATURE: 98 F | BODY MASS INDEX: 35.03 KG/M2 | RESPIRATION RATE: 18 BRPM

## 2022-01-28 DIAGNOSIS — S93.401A RIGHT ANKLE SPRAIN: Primary | ICD-10-CM

## 2022-01-28 DIAGNOSIS — S93.491A SPRAIN OF OTHER LIGAMENT OF RIGHT ANKLE, INITIAL ENCOUNTER: Primary | ICD-10-CM

## 2022-01-28 PROCEDURE — 99283 EMERGENCY DEPT VISIT LOW MDM: CPT | Performed by: EMERGENCY MEDICINE

## 2022-01-28 PROCEDURE — 99213 OFFICE O/P EST LOW 20 MIN: CPT | Performed by: NURSE PRACTITIONER

## 2022-01-28 PROCEDURE — 73610 X-RAY EXAM OF ANKLE: CPT

## 2022-01-28 NOTE — ED PROVIDER NOTES
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 Taking?    Ascorbic Acid (VITAMIN C) 1000 MG tablet  Self Yes No   Sig: Take 1,000 mg by mouth daily   EPINEPHrine (EPIPEN) 0 3 mg/0 3 mL SOAJ   No No   Sig: Inject 0 3 mL (0 3 mg total) into a muscle once for 1 dose As needed for allergy reaction   Levothyroxine Sodium 88 MCG CAPS  Self Yes No   Si mcg daily in the early morning    Multiple Vitamin (MULTIVITAMIN) tablet  Self Yes No   Sig: Take 1 tablet by mouth daily   Tuberculin-Allergy Syringes (ALLERGY SYRINGE 1CC/27GX1/2") 27G X 1/2" 1 ML MISC  Self No No   Sig: by Does not apply route once a week 2 allergy injections once per month   Patient not taking: Reported on 2021   Tuberculin-Allergy Syringes (ALLERGY SYRINGE 1CC/27GX1/2") 27G X 1/2" 1 ML MISC   No No   Si injections every 4-5 weeks   citalopram (CeleXA) 20 mg tablet  Self Yes No   Si mg daily     latanoprost (XALATAN) 0 005 % ophthalmic solution  Self Yes No   Sig: place 1 drop into both eyes daily   montelukast (SINGULAIR) 10 mg tablet  Self Yes No   Sig: Take 10 mg by mouth daily at bedtime   naproxen (NAPROSYN) 375 mg tablet  Self No No   Sig: Take 1 tablet (375 mg total) by mouth 2 (two) times a day as needed for moderate pain   Patient not taking: Reported on 8/24/2020   predniSONE 20 mg tablet   No No   Sig: 3 tabs by mouth daily x 3 days, 2 tabs by mouth daily x 3 days, then 1 tab by mouth daily x 3 days   valsartan-hydrochlorothiazide (DIOVAN-HCT) 80-12 5 MG per tablet  Self Yes No   Sig: Take 1 tablet by mouth daily      Facility-Administered Medications: None       Past Medical History:   Diagnosis Date    Allergic rhinitis     Anxiety     Disease of thyroid gland     Hypertension        Past Surgical History:   Procedure Laterality Date    ADENOIDECTOMY      CHOLECYSTECTOMY      FOOT SURGERY Left     KNEE ARTHROPLASTY Right     NASAL SEPTUM SURGERY      ROTATOR CUFF REPAIR Bilateral     TONSILLECTOMY  childhood       Family History   Problem Relation Age of Onset    No Known Problems Mother     Hypertension Father     Hypertension Paternal Grandmother     Hypertension Paternal Grandfather      I have reviewed and agree with the history as documented  E-Cigarette/Vaping    E-Cigarette Use Never User      E-Cigarette/Vaping Substances    Nicotine No     THC No     CBD No     Flavoring No     Other No     Unknown No      Social History     Tobacco Use    Smoking status: Never Smoker    Smokeless tobacco: Current User     Types: Chew   Vaping Use    Vaping Use: Never used   Substance Use Topics    Alcohol use: Yes     Comment: socially    Drug use: No       Review of Systems   Constitutional: Positive for activity change  Negative for appetite change, chills and fever  Musculoskeletal: Positive for arthralgias (right ankle), gait problem and joint swelling (right ankle)  Negative for back pain, myalgias, neck pain and neck stiffness  Skin: Negative for rash and wound  Neurological: Negative for weakness and numbness  All other systems reviewed and are negative  Physical Exam  Physical Exam  Vitals and nursing note reviewed  Constitutional:       General: He is not in acute distress       Appearance: He is not ill-appearing, toxic-appearing or diaphoretic  Musculoskeletal:      Right knee: Normal       Right lower leg: Normal       Right ankle: Swelling present  No deformity, ecchymosis or lacerations  Tenderness present over the lateral malleolus  No medial malleolus, ATF ligament, AITF ligament, posterior TF ligament, base of 5th metatarsal or proximal fibula tenderness  Decreased range of motion  Anterior drawer test positive  Abnormal pulse  Right Achilles Tendon: Normal  No tenderness or defects  Funk's test negative  Right foot: Normal         Legs:       Comments: 2+ AT pulse   Skin:     General: Skin is warm and dry  Capillary Refill: Capillary refill takes less than 2 seconds  Findings: No rash  Neurological:      General: No focal deficit present  Mental Status: He is alert  Mental status is at baseline  Cranial Nerves: No cranial nerve deficit  Sensory: No sensory deficit  Motor: No weakness  Coordination: Coordination normal       Gait: Gait abnormal       Deep Tendon Reflexes: Reflexes normal       Comments: Favors right ankle   Psychiatric:         Mood and Affect: Mood normal          Vital Signs  ED Triage Vitals [01/28/22 0002]   Temperature Pulse Respirations Blood Pressure SpO2   98 °F (36 7 °C) 77 18 160/90 98 %      Temp Source Heart Rate Source Patient Position - Orthostatic VS BP Location FiO2 (%)   Temporal Monitor Sitting Left arm --      Pain Score       5           Vitals:    01/28/22 0002   BP: 160/90   Pulse: 77   Patient Position - Orthostatic VS: Sitting         Visual Acuity      ED Medications  Medications - No data to display    Diagnostic Studies  Results Reviewed     None                 XR ankle 3+ views RIGHT   ED Interpretation by Catalina Dawson MD (01/28 1625)   Read by me; Radiologist to provide formal interpretation   No acute fracture                 Procedures  Procedures         ED Course  ED Course as of 01/28/22 0411   Fri Jan 28, 2022 0011 Declines NSAIDS   0052 Declines crutches                               SBIRT 22yo+      Most Recent Value   SBIRT (25 yo +)    In order to provide better care to our patients, we are screening all of our patients for alcohol and drug use  Would it be okay to ask you these screening questions? Yes Filed at: 01/28/2022 0007   Initial Alcohol Screen: US AUDIT-C     1  How often do you have a drink containing alcohol? 0 Filed at: 01/28/2022 0007   2  How many drinks containing alcohol do you have on a typical day you are drinking? 0 Filed at: 01/28/2022 0007   3a  Male UNDER 65: How often do you have five or more drinks on one occasion? 0 Filed at: 01/28/2022 0007   Audit-C Score 0 Filed at: 01/28/2022 0007   CAMERON: How many times in the past year have you    Used an illegal drug or used a prescription medication for non-medical reasons? Never Filed at: 01/28/2022 0007                    MDM  Number of Diagnoses or Management Options  Diagnosis management comments: Mdm: sprain vs fracture      Disposition  Final diagnoses:   Right ankle sprain     Time reflects when diagnosis was documented in both MDM as applicable and the Disposition within this note     Time User Action Codes Description Comment    1/28/2022 12:49 AM Alfonso Escobedo Add [D85 600P] Right ankle sprain       ED Disposition     ED Disposition Condition Date/Time Comment    Discharge Stable Fri Jan 28, 2022 12:49 AM Brandon Harmon discharge to home/self care              Follow-up Information     Follow up With Specialties Details Why Contact Info Additional Information    [facility] Go today occupational medicine [address] [facility]  [phone] [address] [facility] [place], [address] [place] [address], [facility], [place], [address] [facility]  occupational medicine [address] [facility]  [address] [facility] [place], [address] [place] [address], [place] [place], [place], [address] [facility]          Discharge Medication List as of 1/28/2022 12:57 AM      CONTINUE these medications which have NOT CHANGED    Details   Ascorbic Acid (VITAMIN C) 1000 MG tablet Take 1,000 mg by mouth daily, Historical Med      citalopram (CeleXA) 20 mg tablet 20 mg daily  , Starting Mon 12/13/2010, Historical Med      EPINEPHrine (EPIPEN) 0 3 mg/0 3 mL SOAJ Inject 0 3 mL (0 3 mg total) into a muscle once for 1 dose As needed for allergy reaction, Starting Tue 8/31/2021, Normal      latanoprost (XALATAN) 0 005 % ophthalmic solution place 1 drop into both eyes daily, Historical Med      Levothyroxine Sodium 88 MCG CAPS 88 mcg daily in the early morning , Starting Wed 12/2/2015, Historical Med      montelukast (SINGULAIR) 10 mg tablet Take 10 mg by mouth daily at bedtime, Historical Med      Multiple Vitamin (MULTIVITAMIN) tablet Take 1 tablet by mouth daily, Historical Med      naproxen (NAPROSYN) 375 mg tablet Take 1 tablet (375 mg total) by mouth 2 (two) times a day as needed for moderate pain, Starting Fri 5/29/2020, Normal      predniSONE 20 mg tablet 3 tabs by mouth daily x 3 days, 2 tabs by mouth daily x 3 days, then 1 tab by mouth daily x 3 days, Normal      !! Tuberculin-Allergy Syringes (ALLERGY SYRINGE 1CC/27GX1/2") 27G X 1/2" 1 ML MISC by Does not apply route once a week 2 allergy injections once per month, Starting Mon 8/12/2019, Normal      !! Tuberculin-Allergy Syringes (ALLERGY SYRINGE 1CC/27GX1/2") 27G X 1/2" 1 ML MISC 2 injections every 4-5 weeks, Normal      valsartan-hydrochlorothiazide (DIOVAN-HCT) 80-12 5 MG per tablet Take 1 tablet by mouth daily, Historical Med       !! - Potential duplicate medications found  Please discuss with provider  No discharge procedures on file      PDMP Review     None          ED Provider  Electronically Signed by           Pratibha Zheng MD  01/28/22 5627

## 2022-01-28 NOTE — DISCHARGE INSTRUCTIONS
Ice elevate  Air case when up and around  Tylenol 650mg every 6 hours as needed for pain, fever (max 3000mg in 24 hours)   Aleve 2 tabs twice daily with food OR ibuprofen 200-800mg every 8 hours with food as needed for pain   Recheck your blood pressure with your family doctor

## 2022-02-03 ENCOUNTER — OCCMED (OUTPATIENT)
Dept: URGENT CARE | Facility: CLINIC | Age: 52
End: 2022-02-03
Payer: OTHER MISCELLANEOUS

## 2022-02-03 DIAGNOSIS — S93.491A SPRAIN OF OTHER LIGAMENT OF RIGHT ANKLE, INITIAL ENCOUNTER: Primary | ICD-10-CM

## 2022-02-03 PROCEDURE — 99213 OFFICE O/P EST LOW 20 MIN: CPT | Performed by: NURSE PRACTITIONER

## 2022-02-11 ENCOUNTER — PATIENT OUTREACH (OUTPATIENT)
Dept: CASE MANAGEMENT | Facility: HOSPITAL | Age: 52
End: 2022-02-11

## 2022-02-11 NOTE — PROGRESS NOTES
Out Patient Care Management Note:  SDOH questionnaire sent via [employer] to assess for social needs

## 2022-02-16 ENCOUNTER — PATIENT OUTREACH (OUTPATIENT)
Dept: CASE MANAGEMENT | Facility: HOSPITAL | Age: 52
End: 2022-02-16

## 2022-02-16 NOTE — PROGRESS NOTES
Out Patient Care Management Note:  Patient's responses to SDOH questionnaire prompted a possible SW referral due to:  [employer], [employer], and Stress  Telephone outreach made to introduce self and role of care management, follow up on general health, outreach for any possible medical or psychosocial services needed and assess for COVID-19 vaccine confidence  Spoke with patient  Patient states current inflation as the reason for his answers on SDOH Questionnaire  Patient and wife are both employed  Monthly income is too high to qualify for any social programs / [facility], on [employer] programs  COVID 19 Vaccine Confidence  Per chart review patient has received 2 vaccine doses  Did you get the booster shot? Yes I did  (no documentation found)  Patient denies need for MSW referral for [address] resources  States he can receive these resources through his work if he finds it necessary  ED follow up episode will be closed at this time

## 2022-02-17 ENCOUNTER — APPOINTMENT (OUTPATIENT)
Dept: LAB | Facility: CLINIC | Age: 52
End: 2022-02-17
Payer: COMMERCIAL

## 2022-02-17 DIAGNOSIS — I51.9 MYXEDEMA HEART DISEASE: ICD-10-CM

## 2022-02-17 DIAGNOSIS — E61.1 IRON DEFICIENCY: ICD-10-CM

## 2022-02-17 DIAGNOSIS — I10 ESSENTIAL HYPERTENSION, MALIGNANT: ICD-10-CM

## 2022-02-17 DIAGNOSIS — E55.9 VITAMIN D DEFICIENCY: ICD-10-CM

## 2022-02-17 DIAGNOSIS — E03.9 MYXEDEMA HEART DISEASE: ICD-10-CM

## 2022-02-17 LAB
25(OH)D3 SERPL-MCNC: 24.4 NG/ML (ref 30–100)
ALBUMIN SERPL BCP-MCNC: 3.8 G/DL (ref 3.5–5)
ALP SERPL-CCNC: 79 U/L (ref 46–116)
ALT SERPL W P-5'-P-CCNC: 69 U/L (ref 12–78)
ANION GAP SERPL CALCULATED.3IONS-SCNC: 5 MMOL/L (ref 4–13)
AST SERPL W P-5'-P-CCNC: 35 U/L (ref 5–45)
BASOPHILS # BLD AUTO: 0.07 THOUSANDS/ΜL (ref 0–0.1)
BASOPHILS NFR BLD AUTO: 1 % (ref 0–1)
BILIRUB SERPL-MCNC: 0.62 MG/DL (ref 0.2–1)
BUN SERPL-MCNC: 15 MG/DL (ref 5–25)
CALCIUM SERPL-MCNC: 9 MG/DL (ref 8.3–10.1)
CHLORIDE SERPL-SCNC: 101 MMOL/L (ref 100–108)
CHOLEST SERPL-MCNC: 210 MG/DL
CO2 SERPL-SCNC: 31 MMOL/L (ref 21–32)
CREAT SERPL-MCNC: 0.94 MG/DL (ref 0.6–1.3)
EOSINOPHIL # BLD AUTO: 0.09 THOUSAND/ΜL (ref 0–0.61)
EOSINOPHIL NFR BLD AUTO: 1 % (ref 0–6)
ERYTHROCYTE [DISTWIDTH] IN BLOOD BY AUTOMATED COUNT: 16.1 % (ref 11.6–15.1)
GFR SERPL CREATININE-BSD FRML MDRD: 94 ML/MIN/1.73SQ M
GLUCOSE P FAST SERPL-MCNC: 110 MG/DL (ref 65–99)
HCT VFR BLD AUTO: 39.7 % (ref 36.5–49.3)
HDLC SERPL-MCNC: 94 MG/DL
HGB BLD-MCNC: 13.1 G/DL (ref 12–17)
IMM GRANULOCYTES # BLD AUTO: 0.02 THOUSAND/UL (ref 0–0.2)
IMM GRANULOCYTES NFR BLD AUTO: 0 % (ref 0–2)
IRON SERPL-MCNC: 190 UG/DL (ref 65–175)
LDLC SERPL CALC-MCNC: 100 MG/DL (ref 0–100)
LYMPHOCYTES # BLD AUTO: 1.94 THOUSANDS/ΜL (ref 0.6–4.47)
LYMPHOCYTES NFR BLD AUTO: 27 % (ref 14–44)
MCH RBC QN AUTO: 29.2 PG (ref 26.8–34.3)
MCHC RBC AUTO-ENTMCNC: 33 G/DL (ref 31.4–37.4)
MCV RBC AUTO: 89 FL (ref 82–98)
MONOCYTES # BLD AUTO: 0.82 THOUSAND/ΜL (ref 0.17–1.22)
MONOCYTES NFR BLD AUTO: 11 % (ref 4–12)
NEUTROPHILS # BLD AUTO: 4.37 THOUSANDS/ΜL (ref 1.85–7.62)
NEUTS SEG NFR BLD AUTO: 60 % (ref 43–75)
NONHDLC SERPL-MCNC: 116 MG/DL
NRBC BLD AUTO-RTO: 0 /100 WBCS
PLATELET # BLD AUTO: 345 THOUSANDS/UL (ref 149–390)
PMV BLD AUTO: 9.6 FL (ref 8.9–12.7)
POTASSIUM SERPL-SCNC: 4 MMOL/L (ref 3.5–5.3)
PROT SERPL-MCNC: 7 G/DL (ref 6.4–8.2)
RBC # BLD AUTO: 4.48 MILLION/UL (ref 3.88–5.62)
SODIUM SERPL-SCNC: 137 MMOL/L (ref 136–145)
T4 FREE SERPL-MCNC: 0.98 NG/DL (ref 0.76–1.46)
TRIGL SERPL-MCNC: 81 MG/DL
TSH SERPL DL<=0.05 MIU/L-ACNC: 4.46 UIU/ML (ref 0.36–3.74)
WBC # BLD AUTO: 7.31 THOUSAND/UL (ref 4.31–10.16)

## 2022-02-17 PROCEDURE — 80053 COMPREHEN METABOLIC PANEL: CPT

## 2022-02-17 PROCEDURE — 80061 LIPID PANEL: CPT

## 2022-02-17 PROCEDURE — 83540 ASSAY OF IRON: CPT

## 2022-02-17 PROCEDURE — 84439 ASSAY OF FREE THYROXINE: CPT

## 2022-02-17 PROCEDURE — 84443 ASSAY THYROID STIM HORMONE: CPT

## 2022-02-17 PROCEDURE — 82306 VITAMIN D 25 HYDROXY: CPT

## 2022-02-17 PROCEDURE — 36415 COLL VENOUS BLD VENIPUNCTURE: CPT

## 2022-02-17 PROCEDURE — 85025 COMPLETE CBC W/AUTO DIFF WBC: CPT

## 2022-04-07 NOTE — PROGRESS NOTES
Pt advised to go to the ED for higher level of care.Pt verbalized understanding of discharge instructions. Encouraged questions, no questions at this time. Pt safely ambulated to [facility]. Arpan ARZATE.     This encounter was created for [facility] orders only

## 2022-04-08 ENCOUNTER — LAB REQUISITION (OUTPATIENT)
Dept: LAB | Facility: HOSPITAL | Age: 52
End: 2022-04-08
Payer: COMMERCIAL

## 2022-04-08 DIAGNOSIS — R10.13 EPIGASTRIC PAIN: ICD-10-CM

## 2022-04-08 PROCEDURE — 88305 TISSUE EXAM BY PATHOLOGIST: CPT | Performed by: PATHOLOGY

## 2022-04-08 PROCEDURE — 88313 SPECIAL STAINS GROUP 2: CPT | Performed by: PATHOLOGY

## 2022-06-15 NOTE — PROGRESS NOTES
PT Evaluation  and PT Discharge    Today's date: 2022  Patient name: Raimundo Mcmahon  : 1970  MRN: [id number]  Referring provider: Kerwin Wesley MD  Dx:   Encounter Diagnosis     ICD-10-CM    1  Ulnar impaction syndrome, left  M25 [id number]                   Assessment  Assessment details: Pt is a 55 YO male presenting to PT with pain, decreased AROM, strength and tolerance to activity  Pt would benefit from skilled intervention to address these issues and maximize overall function  Occupation-  [address] [facility] [employer]- right; Involved- left      Goals  ST  Decrease pain to 0-2/10 in 4-8 weeks            2   Provide orthotic for protection and symptom control  LT  Increase independence with ADL and self care by DC            2  Ability to RT recreational activity by 1 [facility] [address] details: Pt will be seen as needed for orthosis checks and modifications  Patient would benefit from: skilled physical therapy and custom splinting  Planned therapy interventions: orthotic fitting/training and orthotic management and training  Frequency: 1x week  Duration in weeks: 4  Treatment plan discussed with: patient        Subjective Evaluation    History of Present Illness  Mechanism of injury: Progressive worsening of pain left wrist  No history of trauma noted    Pain  Current pain ratin  At best pain ratin  At worst pain ratin  Location: dorsal wrist  Quality: radiating and sharp    Hand dominance: right    Treatments  Current treatment: physical therapy  Patient Goals  Patient goals for therapy: decreased pain, increased motion, increased strength, independence with ADLs/IADLs and return to sport/leisure activities          Objective     General Comments:      Wrist/Hand Comments  AROM left FA S/P- 85/90; wrist E/F- 65/55  Strength-  L/R- 50/60; 3 LISSETH-   Function- pt notes pain with resisted flexion/supination at the wrist  Circumference at wrist- 17 5 cm  Sensation- intact to light  PT fabricated a [place] splint with FA neutral   Pt was instructed in daytime wear, use and care               Precautions: wear orthosis as directed      Manuals 6/16                         [facility] 1-L                                      Neuro Re-Ed                                                                                                        Ther Ex                                                                                                                                                                                                   Modalities

## 2022-06-16 ENCOUNTER — EVALUATION (OUTPATIENT)
Dept: PHYSICAL THERAPY | Facility: CLINIC | Age: 52
End: 2022-06-16
Payer: COMMERCIAL

## 2022-06-16 DIAGNOSIS — M25.832 ULNAR IMPACTION SYNDROME, LEFT: Primary | ICD-10-CM

## 2022-06-16 PROCEDURE — L3763 EWHO RIGID W/O JNTS CF: HCPCS | Performed by: PHYSICAL THERAPIST

## 2022-06-16 NOTE — LETTER
2022    Nasima Linda MD  [facility] [address]    Patient: Barbara Alvarez   YOB: 1970   Date of Visit: 2022     Encounter Diagnosis     ICD-10-CM    1  Ulnar impaction syndrome, left  [id number]        Dear Dr Cheryl Tobias: Thank you for your recent referral of Barbara Alvarez  Please review the attached evaluation summary from Evan's recent visit  Please verify that you agree with the plan of care by signing the attached order  If you have any questions or concerns, please do not hesitate to call  I sincerely appreciate the opportunity to share in the care of one of your patients and hope to have another opportunity to work with you in the near future  Sincerely,    Violette Benítez DPT, T    Referring Provider:      I certify that I have read the below Plan of Care and certify the need for these services furnished under this plan of treatment while under my care  Nasima Linda MD  [facility] [address]  Via Fax: [phone]          PT Evaluation     Today's date: 2022  Patient name: Barbara Alvarez  : 1970  MRN: [id number]  Referring provider: Karo Montesinos MD  Dx:   Encounter Diagnosis     ICD-10-CM    1  Ulnar impaction syndrome, left  M25 832                   Assessment  Assessment details: Pt is a 53 YO male presenting to PT with pain, decreased AROM, strength and tolerance to activity  Pt would benefit from skilled intervention to address these issues and maximize overall function  Occupation-  [address] [facility] [employer]- right; Involved- left      Goals  ST  Decrease pain to 0-2/10 in 4-8 weeks            2   Provide orthotic for protection and symptom control  LT  Increase independence with ADL and self care by DC            2   Ability to RT recreational activity by 1 [facility] ,[address] details: Pt will be seen as needed for orthosis checks and modifications  Patient would benefit from: skilled physical therapy and custom splinting  Planned therapy interventions: orthotic fitting/training and orthotic management and training  Frequency: 1x week  Duration in weeks: 4  Treatment plan discussed with: patient        Subjective Evaluation    History of Present Illness  Mechanism of injury: Progressive worsening of pain left wrist  No history of trauma noted  Pain  Current pain ratin  At best pain ratin  At worst pain ratin  Location: dorsal wrist  Quality: radiating and sharp    Hand dominance: right    Treatments  Current treatment: physical therapy  Patient Goals  Patient goals for therapy: decreased pain, increased motion, increased strength, independence with ADLs/IADLs and return to sport/leisure activities          Objective     General Comments:      Wrist/Hand Comments  AROM left FA S/P- 85/90; wrist E/F- 65/55  Strength-  L/R- 50/60; 3 LISSETH-   Function- pt notes pain with resisted flexion/supination at the wrist  Circumference at wrist- 17 5 cm  Sensation- intact to light  PT fabricated a [place] splint with FA neutral   Pt was instructed in daytime wear, use and care               Precautions: wear orthosis as directed      Manuals                          [facility] 1-L                                      Neuro Re-Ed                                                                                                        Ther Ex                                                                                                                                                                                                   Modalities

## 2022-08-10 NOTE — PROGRESS NOTES
PT Evaluation  and PT Discharge    Today's date: 2022  Patient name: Doris Macario  : 1970  MRN: [id number]  Referring provider: Dali Mcclure MD  Dx:   Encounter Diagnosis     ICD-10-CM    1  Radial styloid tenosynovitis  M65 4                   Assessment  Assessment details: Pt is a 51 YO male presenting to PT with pain, decreased AROM, strength and tolerance to activity  Pt would benefit from skilled intervention to address these issues and maximize overall function  Occupation-  [address] [facility]- right; Involved- left      Goals  ST  Decrease pain to 0-2/10 in 4-8 weeks            2   Provide orthotic for protection and symptom control  LT  Increase independence with ADL and self care by DC            2  Ability to RT recreational activity by 1 [facility] ,[address] details: Pt will be seen as needed for orthosis checks and modifications  Patient would benefit from: skilled physical therapy and custom splinting  Planned therapy interventions: orthotic fitting/training and orthotic management and training  Frequency: 1x week  Duration in weeks: 4  Treatment plan discussed with: patient        Subjective Evaluation    History of Present Illness  Mechanism of injury: Progressive worsening of pain left wrist and thumb  No history of trauma noted    Pain  Current pain ratin  At best pain ratin  At worst pain ratin  Location: dorsal wrist  Quality: radiating and sharp    Hand dominance: right    Treatments  Current treatment: physical therapy  Patient Goals  Patient goals for therapy: decreased pain, increased motion, increased strength, independence with ADLs/IADLs and return to sport/leisure activities          Objective     General Comments:      Wrist/Hand Comments  AROM left FA S/P- 85/90; wrist E/F- 65/55  Strength-  L/R- 50/60; 3 LISSETH- 10/16  Function- pt notes pain with active use of his thumb  Circumference at wrist- 17 5 cm  Sensation- intact to light  PT fabricated an ulnar sided thumb spica  Pt was instructed in daytime wear, use and care               Precautions: wear orthosis as directed      Manuals 8/11                         [facility] 1-L                                      Neuro Re-Ed                                                                                                        Ther Ex                                                                                                                                                                                                   Modalities

## 2022-08-11 ENCOUNTER — EVALUATION (OUTPATIENT)
Dept: PHYSICAL THERAPY | Facility: CLINIC | Age: 52
End: 2022-08-11
Payer: COMMERCIAL

## 2022-08-11 DIAGNOSIS — M65.4 RADIAL STYLOID TENOSYNOVITIS: Primary | ICD-10-CM

## 2022-08-11 PROCEDURE — L3808 WHFO, RIGID W/O JOINTS: HCPCS | Performed by: PHYSICAL THERAPIST

## 2022-08-11 PROCEDURE — 97161 PT EVAL LOW COMPLEX 20 MIN: CPT | Performed by: PHYSICAL THERAPIST

## 2022-08-11 NOTE — LETTER
2022    MD Yodit Olivares [address] [place] [address]    Patient: Kyra Mccarty   YOB: 1970   Date of Visit: 2022     Encounter Diagnosis     ICD-10-CM    1  Radial styloid tenosynovitis  M65 4        Dear Dr José Miguel Decker: Thank you for your recent referral of Kyra Mccarty  Please review the attached evaluation summary from Evan's recent visit  Please verify that you agree with the plan of care by signing the attached order  If you have any questions or concerns, please do not hesitate to call  I sincerely appreciate the opportunity to share in the care of one of your patients and hope to have another opportunity to work with you in the near future  Sincerely,    Lydia Ariza DPT, CHT    Referring Provider:      I certify that I have read the below Plan of Care and certify the need for these services furnished under this plan of treatment while under my care  MD Yodit Olivares [address] [place] [address]  Via Fax: [phone]          PT Evaluation  and PT Discharge    Today's date: 2022  Patient name: Kyra Mccarty  : 1970  MRN: [id number]  Referring provider: Parvez Barber MD  Dx:   Encounter Diagnosis     ICD-10-CM    1  Radial styloid tenosynovitis  M65 4                   Assessment  Assessment details: Pt is a 51 YO male presenting to PT with pain, decreased AROM, strength and tolerance to activity  Pt would benefit from skilled intervention to address these issues and maximize overall function  Occupation-  [address] [facility]- right; Involved- left      Goals  ST  Decrease pain to 0-2/10 in 4-8 weeks            2   Provide orthotic for protection and symptom control  LT  Increase independence with ADL and self care by DC            2   Ability to RT recreational activity by 1 [facility] ,[address] details: Pt will be seen as needed for orthosis checks and modifications  Patient would benefit from: skilled physical therapy and custom splinting  Planned therapy interventions: orthotic fitting/training and orthotic management and training  Frequency: 1x week  Duration in weeks: 4  Treatment plan discussed with: patient        Subjective Evaluation    History of Present Illness  Mechanism of injury: Progressive worsening of pain left wrist and thumb  No history of trauma noted  Pain  Current pain ratin  At best pain ratin  At worst pain ratin  Location: dorsal wrist  Quality: radiating and sharp    Hand dominance: right    Treatments  Current treatment: physical therapy  Patient Goals  Patient goals for therapy: decreased pain, increased motion, increased strength, independence with ADLs/IADLs and return to sport/leisure activities          Objective     General Comments:      Wrist/Hand Comments  AROM left FA S/P- 85/90; wrist E/F- 65/55  Strength-  L/R- 50/60; 3 LISSETH- 10/16  Function- pt notes pain with active use of his thumb  Circumference at wrist- 17 5 cm  Sensation- intact to light  PT fabricated an ulnar sided thumb spica  Pt was instructed in daytime wear, use and care               Precautions: wear orthosis as directed      Manuals                          [facility] 1-L                                      Neuro Re-Ed                                                                                                        Ther Ex                                                                                                                                                                                                   Modalities

## 2022-11-10 ENCOUNTER — OFFICE VISIT (OUTPATIENT)
Dept: PHYSICAL THERAPY | Facility: HOME HEALTHCARE | Age: 52
End: 2022-11-10

## 2022-11-10 DIAGNOSIS — M54.50 CHRONIC BILATERAL LOW BACK PAIN WITHOUT SCIATICA: Primary | ICD-10-CM

## 2022-11-10 DIAGNOSIS — G89.29 CHRONIC BILATERAL LOW BACK PAIN WITHOUT SCIATICA: Primary | ICD-10-CM

## 2022-11-10 NOTE — PROGRESS NOTES
PT Evaluation     Today's date: 11/10/2022  Patient name: Bre Amaya  : 1970  MRN: [id number]  Referring provider: Dinesh Geiger MD  Dx:   Encounter Diagnosis     ICD-10-CM    1  Chronic bilateral low back pain without sciatica  M54 50     G89 29                   Assessment  Assessment details: Pt Iglesia Ndiaye is a 50 y o  who presents to OPPT with s/s consistent with B SI arthritis involvement  Pt presents with limited L/S mobility and core strength deficits, decreased B LE strength, impaired soft tissue mobility/limited flexibility, decreased postural awareness, and gait/balance dysfunctions  Pt with limitations with prolonged ambulation, difficulty with stair negotiation, disrupted sleep patterns, and difficulty with lifting/carrying  Pt continues to work and complete recreational activities but notes increased pain with prolonged walking and sit to stand transitioning  He does intermittent use SI stability belt which does provide some relief  Pt would benefit from skilled therapy services to address outlined impairments, work towards goals, and restore pts PLOF  Thank you!    Impairments: abnormal gait, abnormal or restricted ROM, abnormal movement, activity intolerance, impaired balance, impaired physical strength, lacks appropriate home exercise program, pain with function, weight-bearing intolerance, poor posture  and poor body mechanics  Understanding of Dx/Px/POC: good   Prognosis: good    Goals  STGs to be achieved in 4 weeks:  -Pt to demonstrate reduced subjective pain rating "at worst" by at least 2-3 points from Initial Eval to allow for reduced pain with ADLs and improved functional activity tolerance    -Pt to demonstrate L/S ROM improved WFL in order to maximize joint mobility and function and allow for progression of exercise program and achievement of goals    -Pt to demonstrate increased MMT of BLE by at least 1/2 grade in order to improve safety and stability with ADLs and functional mobility  LTGs to be achieved upon discharge:   -Pt will be I with HEP in order to continue to improve quality of life and independence and reduce risk for re-injury    -Pt to demonstrate return to activities of daily living without limiations or restrictions    -Pt will return to ambulation > 1 hour without pain to help facilitate return to community activities independently   -Pt to demonstrate return to work activities without limitations or restrictions    -Pt to demonstrate improved function as noted by achieving or exceeding predicted score on FOTO outcomes assessment tool  Plan  Patient would benefit from: skilled physical therapy  Planned modality interventions: cryotherapy and thermotherapy: hydrocollator packs  Planned therapy interventions: abdominal trunk stabilization, balance, manual therapy, neuromuscular re-education, patient education, postural training, therapeutic activities, therapeutic exercise, home exercise program, flexibility and functional ROM exercises  Frequency: 2x week  Duration in weeks: 4  Plan of Care beginning date: 11/10/2022  Plan of Care expiration date: 2022  Treatment plan discussed with: patient and referring physician        Subjective Evaluation    History of Present Illness  Mechanism of injury: Pt reporting that he has been having lower back pain for the past 4-5 years  He has had series of injections with temporary, 4-6 week, relief only  He is seeing the chiropractor but notes he usual only gets 1 day of relief and then feels stiff again the next morning  He is seeing Orthopedic who doesn't want to keep doing injections and is referring to OPPT for conservative management of s/s     Quality of life: good    Pain  At best pain ratin  At worst pain ratin  Quality: dull ache and sharp  Relieving factors: medications, heat and ice (OTC prn )  Progression: no change    Social Support  Stairs in house: yes   Lives in: multiple-level home  Lives with: spouse    Employment status: working    Diagnostic Tests  MRI studies: abnormal  Treatments  Previous treatment: injection treatment  Current treatment: physical therapy  Patient Goals  Patient goals for therapy: decreased edema, decreased pain, improved balance, increased motion, increased strength and independence with ADLs/IADLs          Objective     Concurrent Complaints  Positive for disturbed sleep  Negative for bladder dysfunction, bowel dysfunction and saddle (S4) numbness    Postural Observations  Seated posture: fair  Standing posture: good        Palpation   Left   Tenderness of the erector spinae and piriformis  Right   Tenderness of the erector spinae and piriformis  Tenderness     Lumbar Spine  Tenderness in the facet joint  Left Hip   Tenderness in the PSIS  Right Hip   Tenderness in the PSIS  Neurological Testing     Sensation     Lumbar   Left   Intact: light touch    Right   Intact: light touch    Active Range of Motion     Lumbar   Flexion:  with pain Restriction level: moderate  Extension:  Restriction level: minimal  Left lateral flexion:  Restriction level: minimal  Right lateral flexion:  Restriction level: minimal  Left Hip   Flexion: WFL and with pain  Abduction: WFL    Right Hip   Flexion: WFL and with pain  Abduction: WFL  Left Knee   Flexion: WFL  Extension: WFL    Right Knee   Flexion: WFL  Extension: WFL    Strength/Myotome Testing     Left Hip   Planes of Motion   Flexion: 4-  Abduction: 4  Adduction: 4    Right Hip   Planes of Motion   Flexion: 4-  Abduction: 4  Adduction: 4    Left Knee   Flexion: 4+  Extension: 4+    Right Knee   Flexion: 4+  Extension: 4+    Tests     Left Hip   Positive piriformis and SI compression  Right Hip   Positive piriformis and SI compression       Ambulation     Ambulation: Level Surfaces     Additional Level Surfaces Ambulation Details  Tolerance > 2-3 hours but with increase in pain; use of SI belt for added stability and pain relief     Ambulation: Stairs   Pattern: reciprocal  Pattern: reciprocal             Re-eval Date: 12/10/22     Precautions: None       Manuals 11/10                                       Neuro Re-Ed         PPT        PPT with annamarie         TA        TA + OH lifts        MTP/LTP        Palloff press         SI joint dysfunction pathology - avoidance of aggravating movement patterns  HZ       Ther Ex        NuStep  L1 10 minutes        Standing hip flex/abd/ext        Squats         Step-ups         LTR        SKTC        SLR        S/L SLR        Clamshells         Heel walk outs         Bridges                                 Ther Activity                        Gait Training                        Modalities

## 2022-11-10 NOTE — LETTER
November 10, 2022    [facility] [address] [facility]    Patient: Christine Ivy   YOB: 1970   Date of Visit: 11/10/2022     Encounter Diagnosis     ICD-10-CM    1  Chronic bilateral low back pain without sciatica  M54 50     G89 29        Dear Dr Kaya Lewis:    Thank you for your recent referral of Christine Ivy  Please review the attached evaluation summary from Evan's recent visit  Please verify that you agree with the plan of care by signing the attached order  If you have any questions or concerns, please do not hesitate to call  I sincerely appreciate the opportunity to share in the care of one of your patients and hope to have another opportunity to work with you in the near future  Sincerely,    Alicia Lopez, PT      Referring Provider:      I certify that I have read the below Plan of Care and certify the need for these services furnished under this plan of treatment while under my care  Mavis Chadwick MD  [facility]  [facility] [place]          PT Evaluation     Today's date: 11/10/2022  Patient name: Christine Ivy  : 1970  MRN: [id number]  Referring provider: Alf Peoples MD  Dx:   Encounter Diagnosis     ICD-10-CM    1  Chronic bilateral low back pain without sciatica  M54 50     G89 29                   Assessment  Assessment details: Pt Juan Pablo Nicole is a 50 y o  who presents to OPPT with s/s consistent with B SI arthritis involvement  Pt presents with limited L/S mobility and core strength deficits, decreased B LE strength, impaired soft tissue mobility/limited flexibility, decreased postural awareness, and gait/balance dysfunctions  Pt with limitations with prolonged ambulation, difficulty with stair negotiation, disrupted sleep patterns, and difficulty with lifting/carrying   Pt continues to work and complete recreational activities but notes increased pain with prolonged walking and sit to stand transitioning  He does intermittent use SI stability belt which does provide some relief  Pt would benefit from skilled therapy services to address outlined impairments, work towards goals, and restore pts PLOF  Thank you! Impairments: abnormal gait, abnormal or restricted ROM, abnormal movement, activity intolerance, impaired balance, impaired physical strength, lacks appropriate home exercise program, pain with function, weight-bearing intolerance, poor posture  and poor body mechanics  Understanding of Dx/Px/POC: good   Prognosis: good    Goals  STGs to be achieved in 4 weeks:  -Pt to demonstrate reduced subjective pain rating "at worst" by at least 2-3 points from Initial Eval to allow for reduced pain with ADLs and improved functional activity tolerance    -Pt to demonstrate L/S ROM improved WFL in order to maximize joint mobility and function and allow for progression of exercise program and achievement of goals    -Pt to demonstrate increased MMT of BLE by at least 1/2 grade in order to improve safety and stability with ADLs and functional mobility  LTGs to be achieved upon discharge:   -Pt will be I with HEP in order to continue to improve quality of life and independence and reduce risk for re-injury    -Pt to demonstrate return to activities of daily living without limiations or restrictions    -Pt will return to ambulation > 1 hour without pain to help facilitate return to community activities independently   -Pt to demonstrate return to work activities without limitations or restrictions    -Pt to demonstrate improved function as noted by achieving or exceeding predicted score on FOTO outcomes assessment tool            Plan  Patient would benefit from: skilled physical therapy  Planned modality interventions: cryotherapy and thermotherapy: hydrocollator packs  Planned therapy interventions: abdominal trunk stabilization, balance, manual therapy, neuromuscular re-education, patient education, postural training, therapeutic activities, therapeutic exercise, home exercise program, flexibility and functional ROM exercises  Frequency: 2x week  Duration in weeks: 4  Plan of Care beginning date: 11/10/2022  Plan of Care expiration date: 2022  Treatment plan discussed with: patient and referring physician        Subjective Evaluation    History of Present Illness  Mechanism of injury: Pt reporting that he has been having lower back pain for the past 4-5 years  He has had series of injections with temporary, 4-6 week, relief only  He is seeing the chiropractor but notes he usual only gets 1 day of relief and then feels stiff again the next morning  He is seeing Orthopedic who doesn't want to keep doing injections and is referring to OPPT for conservative management of s/s  Quality of life: good    Pain  At best pain ratin  At worst pain ratin  Quality: dull ache and sharp  Relieving factors: medications, heat and ice (OTC prn )  Progression: no change    Social Support  Stairs in house: yes   Lives in: multiple-level home  Lives with: spouse    Employment status: working    Diagnostic Tests  MRI studies: abnormal  Treatments  Previous treatment: injection treatment  Current treatment: physical therapy  Patient Goals  Patient goals for therapy: decreased edema, decreased pain, improved balance, increased motion, increased strength and independence with ADLs/IADLs          Objective     Concurrent Complaints  Positive for disturbed sleep  Negative for bladder dysfunction, bowel dysfunction and saddle (S4) numbness    Postural Observations  Seated posture: fair  Standing posture: good        Palpation   Left   Tenderness of the erector spinae and piriformis  Right   Tenderness of the erector spinae and piriformis  Tenderness     Lumbar Spine  Tenderness in the facet joint  Left Hip   Tenderness in the PSIS       Right Hip   Tenderness in the PSIS      Neurological Testing     Sensation     Lumbar   Left   Intact: light touch    Right   Intact: light touch    Active Range of Motion     Lumbar   Flexion:  with pain Restriction level: moderate  Extension:  Restriction level: minimal  Left lateral flexion:  Restriction level: minimal  Right lateral flexion:  Restriction level: minimal  Left Hip   Flexion: WFL and with pain  Abduction: WFL    Right Hip   Flexion: WFL and with pain  Abduction: WFL  Left Knee   Flexion: WFL  Extension: WFL    Right Knee   Flexion: WFL  Extension: WFL    Strength/Myotome Testing     Left Hip   Planes of Motion   Flexion: 4-  Abduction: 4  Adduction: 4    Right Hip   Planes of Motion   Flexion: 4-  Abduction: 4  Adduction: 4    Left Knee   Flexion: 4+  Extension: 4+    Right Knee   Flexion: 4+  Extension: 4+    Tests     Left Hip   Positive piriformis and SI compression  Right Hip   Positive piriformis and SI compression       Ambulation     Ambulation: Level Surfaces     Additional Level Surfaces Ambulation Details  Tolerance > 2-3 hours but with increase in pain; use of SI belt for added stability and pain relief     Ambulation: Stairs   Pattern: reciprocal  Pattern: reciprocal             Re-eval Date: 12/10/22     Precautions: None       Manuals 11/10                                       Neuro Re-Ed         PPT        PPT with marches         TA        TA + OH lifts        MTP/LTP        Palloff press         SI joint dysfunction pathology - avoidance of aggravating movement patterns  HZ       Ther Ex        NuStep  L1 10 minutes        Standing hip flex/abd/ext        Squats         Step-ups         LTR        SKTC        SLR        S/L SLR        Clamshells         Heel walk outs         Bridges                                 Ther Activity                        Gait Training                        Modalities

## 2022-11-14 ENCOUNTER — OFFICE VISIT (OUTPATIENT)
Dept: PHYSICAL THERAPY | Facility: HOME HEALTHCARE | Age: 52
End: 2022-11-14

## 2022-11-14 DIAGNOSIS — G89.29 CHRONIC BILATERAL LOW BACK PAIN WITHOUT SCIATICA: Primary | ICD-10-CM

## 2022-11-14 DIAGNOSIS — M54.50 CHRONIC BILATERAL LOW BACK PAIN WITHOUT SCIATICA: Primary | ICD-10-CM

## 2022-11-14 NOTE — PROGRESS NOTES
Daily Note     Today's date: 2022  Patient name: Genesis Jc  : 1970  MRN: [id number]  Referring provider: Melba Dueñas MD  Dx:   Encounter Diagnosis     ICD-10-CM    1  Chronic bilateral low back pain without sciatica  M54 50     G89 29               Subjective: Pt reports he had pain in his back yesterday and has stiffness his back today  Objective: See treatment diary below      Assessment: Tolerated treatment well  Verbal cues needed t/o perform exercises correctly  Pt with no c/o pain t/o session  Core strength deficits noted with exercise  Patient would benefit from continued PT to improve ROM, strength and overall mobility/flexibility  Plan: Continue per plan of care        Re-eval Date: 12/10/22     Precautions: None       Manuals 11/10 11/14                                      Neuro Re-Ed         PPT        PPT with marches         TA  5" x 10      TA + OH lifts        MTP/LTP  Green 1x15 ea      Palloff press         SI joint dysfunction pathology - avoidance of aggravating movement patterns  HZ       Ther Ex        NuStep  L1 10 minutes  L1 10'      Standing hip flex/abd/ext  1x10 ea Jonathan       Squats   1x10       Step-ups         LTR  5"x 10       SKTC  5" x 5       SLR  1x10 Jonathan       S/L SLR  1x10 Jonathan       Clamshells   1x15 Jonathan      Heel walk outs   1 x 10       Bridges   3" 1 x10                               Ther Activity                        Gait Training                        Modalities

## 2022-11-17 ENCOUNTER — OFFICE VISIT (OUTPATIENT)
Dept: PHYSICAL THERAPY | Facility: HOME HEALTHCARE | Age: 52
End: 2022-11-17

## 2022-11-17 DIAGNOSIS — G89.29 CHRONIC BILATERAL LOW BACK PAIN WITHOUT SCIATICA: Primary | ICD-10-CM

## 2022-11-17 DIAGNOSIS — M54.50 CHRONIC BILATERAL LOW BACK PAIN WITHOUT SCIATICA: Primary | ICD-10-CM

## 2022-11-17 NOTE — PROGRESS NOTES
Daily Note     Today's date: 2022  Patient name: Sharri Du  : 1970  MRN: [id number]  Referring provider: Keaton Shafer MD  Dx: No diagnosis found  Start Time: 1605          Subjective: I had some spasms this morning and used ice which helped  Pain of 4/10 mainly at L LB area  Objective: See treatment diary below    Assessment: Tolerated treatment well  VC's needed t/o session as pt tends to perform TE to quickly  Stressed importance of lumbar stability/core control with all positions of ex  Pt denied increased pain t/o ex and reports feeling well at end with CP to LB  Patient would benefit from continued PT    Plan: Continue per plan of care        Re-eval Date: 12/10/22     Precautions: None       Manuals 11/10 11/14 11-17                                     Neuro Re-Ed         PPT        PPT with marches         TA  5" x 10 5" x10     TA + OH lifts        MTP/LTP  Green 1x15 ea Green 1x15 ea      Palloff press         SI joint dysfunction pathology - avoidance of aggravating movement patterns  HZ       Ther Ex        NuStep  L1 10 minutes  L1 10' L2  10'     Standing hip flex/abd/ext  1x10 ea Jonathan  1x10 ea jonathan     Squats   1x10  1x10     Step-ups         LTR  5"x 10  5" x10     SKTC  5" x 5  5" x5     SLR  1x10 Jonathan  1x10 jonathan     S/L SLR  1x10 Jonathan  1x10 jonathan     Clamshells   1x15 Jonathan 1x15 jonathan     Heel walk outs   1 x 10  1x10     Bridges   3" 1 x10  3" 1x10                             Ther Activity                        Gait Training                        Modalities        CP   10'

## 2022-11-21 ENCOUNTER — OFFICE VISIT (OUTPATIENT)
Dept: PHYSICAL THERAPY | Facility: HOME HEALTHCARE | Age: 52
End: 2022-11-21

## 2022-11-21 DIAGNOSIS — M54.50 CHRONIC BILATERAL LOW BACK PAIN WITHOUT SCIATICA: Primary | ICD-10-CM

## 2022-11-21 DIAGNOSIS — G89.29 CHRONIC BILATERAL LOW BACK PAIN WITHOUT SCIATICA: Primary | ICD-10-CM

## 2022-11-21 NOTE — PROGRESS NOTES
Daily Note     Today's date: 2022  Patient name: Aminta Gonzalez  : 1970  MRN: [id number]  Referring provider: Na Espitia MD  Dx:   Encounter Diagnosis     ICD-10-CM    1  Chronic bilateral low back pain without sciatica  M54 50     G89 29           Start Time: 1615  Stop Time: 1710  Total time in clinic (min): 55 minutes    Subjective: Patient reports having some low back pain mostly on the L as 4/10  Objective: See treatment diary below      Assessment: Tolerated treatment well  Jacob Coleman participated in skilled PT session focused on strengthening, stretching, and ROM  Patient demonstrates improved core stabilization with exercises  Patient needed some verbal and tactile cues for proper technique and decreased hip rolling with exercises  Patient would continue to benefit from skilled PT interventions to address strengthening, stretching, core stabilization, and ROM  Patient demonstrated fatigue post treatment      Plan: Continue per plan of care        Re-eval Date: 12/10/22     Precautions: None       Manuals 11/10 11/14 11-                                    Neuro Re-Ed         PPT        PPT with marches     3" x 10     TA  5" x 10 5" x10 5" x 10    TA + OH lifts        MTP/LTP  Green 1x15 ea Green 1x15 ea  Green x 15 ea    Palloff press         SI joint dysfunction pathology - avoidance of aggravating movement patterns  HZ       Ther Ex        NuStep  L1 10 minutes  L1 10' L2  10' L2 x 10'     Standing hip flex/abd/ext  1x10 ea Kiko  1x10 ea kiko X 10 ea B/L    Squats   1x10  1x10 X 10    Step-ups         LTR  5"x 10  5" x10 5" x 10 ea    SKTC  5" x 5  5" x5 5" x 5 ea    SLR  1x10 Kiko  1x10 kiko X 10 ea B/L    S/L SLR  1x10 Kiko  1x10 kiko X 10 ea B/L    Clamshells   1x15 Kiko 1x15 kiko X 15 ea B/L    Heel walk outs   1 x 10  1x10 X 10     Bridges   3" 1 x10  3" 1x10 3" x 10                            Ther Activity                        Gait Training Modalities        CP   10' X 10'

## 2022-11-25 ENCOUNTER — OFFICE VISIT (OUTPATIENT)
Dept: PHYSICAL THERAPY | Facility: HOME HEALTHCARE | Age: 52
End: 2022-11-25

## 2022-11-25 DIAGNOSIS — M54.50 CHRONIC BILATERAL LOW BACK PAIN WITHOUT SCIATICA: Primary | ICD-10-CM

## 2022-11-25 DIAGNOSIS — G89.29 CHRONIC BILATERAL LOW BACK PAIN WITHOUT SCIATICA: Primary | ICD-10-CM

## 2022-11-25 NOTE — PROGRESS NOTES
Daily Note     Today's date: 2022  Patient name: Jason Mccormick  : 1970  MRN: [id number]  Referring provider: Natividad Austin MD  Dx:   Encounter Diagnosis     ICD-10-CM    1  Chronic bilateral low back pain without sciatica  M54 50     G89 29                      Subjective: Patient notes low back pain is about a 4/10 this morning  He says "I have that feeling like it os going to go into spasm "      Objective: See treatment diary below      Assessment: Progressed POC as outlined below  Tolerated treatment well with decreased feeling of potential "spasm" as he continued through session  Patient required some verbal cues to perform exercises with appropriate technique and intensity  Patient would benefit from continued PT to increase trunk mobility and core strength for improve function in ADLs  Plan: Continue per plan of care        Re-eval Date: 12/10/22     Precautions: None       Manuals 11/10 11/14 11-17 11/21 11/25                                   Neuro Re-Ed         PPT     5"x10   PPT with marches     3" x 10  3"x10   TA  5" x 10 5" x10 5" x 10 -   TA + OH lifts        MTP/LTP  Green 1x15 ea Green 1x15 ea  Green x 15 ea Green x 15 ea   Palloff press         SI joint dysfunction pathology - avoidance of aggravating movement patterns  HZ       Ther Ex        NuStep  L1 10 minutes  L1 10' L2  10' L2 x 10'  L3 10'   Standing hip flex/abd/ext  1x10 ea Jonathan  1x10 ea jonathan X 10 ea B/L x10 ea B/L   Squats   1x10  1x10 X 10 x10   Step-ups         LTR  5"x 10  5" x10 5" x 10 ea 10"x10 ea   SKTC  5" x 5  5" x5 5" x 5 ea 5"x5 ea   SLR  1x10 Jonathan  1x10 jonathan X 10 ea B/L X 15 ea B/L   S/L SLR  1x10 Jonathan  1x10 jonathan X 10 ea B/L X 15 ea B/L   Clamshells   1x15 Jonathan 1x15 jonathan X 15 ea B/L X 15 ea B/L   Heel walk outs   1 x 10  1x10 X 10  x10   Bridges   3" 1 x10  3" 1x10 3" x 10 3"x20                           Ther Activity                        Gait Training                        Modalities        CP 10' X 10'

## 2022-11-28 ENCOUNTER — OFFICE VISIT (OUTPATIENT)
Dept: PHYSICAL THERAPY | Facility: HOME HEALTHCARE | Age: 52
End: 2022-11-28

## 2022-11-28 DIAGNOSIS — M54.50 CHRONIC BILATERAL LOW BACK PAIN WITHOUT SCIATICA: Primary | ICD-10-CM

## 2022-11-28 DIAGNOSIS — G89.29 CHRONIC BILATERAL LOW BACK PAIN WITHOUT SCIATICA: Primary | ICD-10-CM

## 2022-11-28 NOTE — PROGRESS NOTES
Daily Note     Today's date: 2022  Patient name: Christine Ivy  : 1970  MRN: [id number]  Referring provider: Alf Peoples MD  Dx:   Encounter Diagnosis     ICD-10-CM    1  Chronic bilateral low back pain without sciatica  M54 50     G89 29                  Subjective: Pt reports he has a little pain LB  Objective: See treatment diary below      Assessment: Tolerated treatment well  Progressed to step ups and palloff press today  Core strength deficits noted  No increased pain reported t/o session  Patient would benefit from continued PT      Plan: Continue per plan of care        Re-eval Date: 12/10/22     Precautions: None       Manuals                                    Neuro Re-Ed         PPT 5"x10    5"x10   PPT with marches  3"x10   3" x 10  3"x10   TA  5" x 10 5" x10 5" x 10 -   TA + OH lifts        MTP/LTP Black x15 ea  Green 1x15 ea Green 1x15 ea  Green x 15 ea Green x 15 ea   Palloff press  Green 1x10 R/L       SI joint dysfunction pathology - avoidance of aggravating movement patterns         Ther Ex        NuStep  L3 10 minutes  L1 10' L2  10' L2 x 10'  L3 10'   Standing hip flex/abd/ext X 15 ea Jonathan  1x10 ea Jonathan  1x10 ea jonathan X 10 ea B/L x10 ea B/L   Squats  X 15 1x10  1x10 X 10 x10   Step-ups  C Fwd 1x15       LTR 10"x10 ea  5"x 10  5" x10 5" x 10 ea 10"x10 ea   SKTC 5"x5 ea  5" x 5  5" x5 5" x 5 ea 5"x5 ea   SLR x15 ea Jonathan  1x10 Jonathan  1x10 jonathan X 10 ea B/L X 15 ea B/L   S/L SLR X 15 ea Jonathan 1x10 Jonathan  1x10 jonathan X 10 ea B/L X 15 ea B/L   Clamshells  X 15 ea Jonathan  1x15 Jonathan 1x15 jonathan X 15 ea B/L X 15 ea B/L   Heel walk outs  x10 1 x 10  1x10 X 10  x10   Bridges  3"x20 3" 1 x10  3" 1x10 3" x 10 3"x20                           Ther Activity                        Gait Training                        Modalities        CP   10' X 10'

## 2022-11-30 ENCOUNTER — OFFICE VISIT (OUTPATIENT)
Dept: PHYSICAL THERAPY | Facility: HOME HEALTHCARE | Age: 52
End: 2022-11-30

## 2022-11-30 DIAGNOSIS — M54.50 CHRONIC BILATERAL LOW BACK PAIN WITHOUT SCIATICA: Primary | ICD-10-CM

## 2022-11-30 DIAGNOSIS — G89.29 CHRONIC BILATERAL LOW BACK PAIN WITHOUT SCIATICA: Primary | ICD-10-CM

## 2022-11-30 NOTE — PROGRESS NOTES
Daily Note     Today's date: 2022  Patient name: Tahmina Perez  : 1970  MRN: [id number]  Referring provider: Silvia Tyson MD  Dx:   Encounter Diagnosis     ICD-10-CM    1  Chronic bilateral low back pain without sciatica  M54 50     G89 29                      Subjective: Pateint notes his back is stiff as usual in the mornings  Objective: See treatment diary below      Assessment: Tolerated treatment well without complaint  Some verbal cues provided to perform exercises with appropriate technique and intensity  Patient would benefit from continued PT to increase trunk mobility and core strength for improved function in ADLs  Plan: Continue per plan of care        Re-eval Date: 12/10/22     Precautions: None       Manuals                                    Neuro Re-Ed         PPT 5"x10 5"x15   5"x10   PPT with marches  3"x10 3"x10 ea  3" x 10  3"x10   TA    5" x 10 -   TA + OH lifts        MTP/LTP Black x15 ea  Black 20x ea  Green x 15 ea Green x 15 ea   Palloff press  Green 1x10 R/L Green 1x10 R/L      SI joint dysfunction pathology - avoidance of aggravating movement patterns         Ther Ex        NuStep  L3 10 minutes  L3 10 min  L2 x 10'  L3 10'   Standing hip flex/abd/ext X 15 ea Jonathan  X 15 ea Jonathan   X 10 ea B/L x10 ea B/L   Squats  X 15 X 15  X 10 x10   Step-ups  C Fwd 1x15 C Fwd/Lat 1x15 ea Jonathan      LTR 10"x10 ea  10"x10 ea  5" x 10 ea 10"x10 ea   SKTC 5"x5 ea  5"x5 ea   5" x 5 ea 5"x5 ea   SLR x15 ea Jonathan  x15 ea Jonathan   X 10 ea B/L X 15 ea B/L   S/L SLR X 15 ea Jonathan x15 ea Jonathan   X 10 ea B/L X 15 ea B/L   Clamshells  X 15 ea Jonathan  x15 ea Jonathan   X 15 ea B/L X 15 ea B/L   Heel walk outs  x10 x10  X 10  x10   Bridges  3"x20 5"x20  3" x 10 3"x20                           Ther Activity                        Gait Training                        Modalities        CP    X 10'

## 2022-12-01 ENCOUNTER — APPOINTMENT (OUTPATIENT)
Dept: PHYSICAL THERAPY | Facility: HOME HEALTHCARE | Age: 52
End: 2022-12-01

## 2022-12-05 ENCOUNTER — APPOINTMENT (OUTPATIENT)
Dept: PHYSICAL THERAPY | Facility: HOME HEALTHCARE | Age: 52
End: 2022-12-05

## 2022-12-06 ENCOUNTER — OFFICE VISIT (OUTPATIENT)
Dept: PHYSICAL THERAPY | Facility: HOME HEALTHCARE | Age: 52
End: 2022-12-06

## 2022-12-06 DIAGNOSIS — M54.50 CHRONIC BILATERAL LOW BACK PAIN WITHOUT SCIATICA: Primary | ICD-10-CM

## 2022-12-06 DIAGNOSIS — G89.29 CHRONIC BILATERAL LOW BACK PAIN WITHOUT SCIATICA: Primary | ICD-10-CM

## 2022-12-06 NOTE — PROGRESS NOTES
Daily Note     Today's date: 2022  Patient name: Josselyn Pompa  : 1970  MRN: [id number]  Referring provider: Miles Estevez MD  Dx:   Encounter Diagnosis     ICD-10-CM    1  Chronic bilateral low back pain without sciatica  M54 50     G89 29                      Subjective: Pt reports he has 5/10 pain LB L side > R side  Objective: See treatment diary below      Assessment: Tolerated treatment well  Progressed to black theraband with palloff press and increased reps with some exercises today  No increased pain reported t/o session  Core strength deficits noted  Patient would benefit from continued PT      Plan: Continue per plan of care        Re-eval Date: 12/10/22     Precautions: None       Manuals                                    Neuro Re-Ed         PPT 5"x10 5"x15 5"x 15  5"x10   PPT with marches  3"x10 3"x10 ea 3"x 10 ea 3" x 10  3"x10   TA    5" x 10 -   TA + OH lifts        MTP/LTP Black x15 ea  Black 20x ea Black x 20 ea  Green x 15 ea Green x 15 ea   Palloff press  Green 1x10 R/L Green 1x10 R/L Black 1x10 R/L     SI joint dysfunction pathology - avoidance of aggravating movement patterns         Ther Ex        NuStep  L3 10 minutes  L3 10 min L3 10 min  L2 x 10'  L3 10'   Standing hip flex/abd/ext X 15 ea Jonathan  X 15 ea Jonathan  1 x15 ea Jonathan  X 10 ea B/L x10 ea B/L   Squats  X 15 X 15 x15  X 10 x10   Step-ups  C Fwd 1x15 C Fwd/Lat 1x15 ea Jonathan C Fwd/Lat   1x 15 ea Jonathan     LTR 10"x10 ea  10"x10 ea 10"x 10 ea  5" x 10 ea 10"x10 ea   SKTC 5"x5 ea  5"x5 ea  5"x 5 ea  5" x 5 ea 5"x5 ea   SLR x15 ea Jonathan  x15 ea Jonathan  X 20ea Jonathan X 10 ea B/L X 15 ea B/L   S/L SLR X 15 ea Jonathan x15 ea Jonathan  X 20 ea Jonathan  X 10 ea B/L X 15 ea B/L   Clamshells  X 15 ea Jonathan  x15 ea Jonathan  X 20 ea Jonathan X 15 ea B/L X 15 ea B/L   Heel walk outs  x10 x10 X 10 X 10  x10   Bridges  3"x20 5"x20 5"x20 3" x 10 3"x20                           Ther Activity                        Gait Training Modalities        CP    X 10'

## 2022-12-08 ENCOUNTER — APPOINTMENT (OUTPATIENT)
Dept: PHYSICAL THERAPY | Facility: HOME HEALTHCARE | Age: 52
End: 2022-12-08

## 2022-12-09 ENCOUNTER — EVALUATION (OUTPATIENT)
Dept: PHYSICAL THERAPY | Facility: HOME HEALTHCARE | Age: 52
End: 2022-12-09

## 2022-12-09 DIAGNOSIS — M54.50 CHRONIC BILATERAL LOW BACK PAIN WITHOUT SCIATICA: Primary | ICD-10-CM

## 2022-12-09 DIAGNOSIS — G89.29 CHRONIC BILATERAL LOW BACK PAIN WITHOUT SCIATICA: Primary | ICD-10-CM

## 2022-12-09 NOTE — PROGRESS NOTES
PT Re-Evaluation     Today's date: 2022  Patient name: Kyra Mccarty  : 1970  MRN: [id number]  Referring provider: Henrik Zee MD  Dx:   Encounter Diagnosis     ICD-10-CM    1  Chronic bilateral low back pain without sciatica  M54 50     G89 29                      Assessment  Assessment details: Pt Anusha Wong is a 50 y o  who presents to OPPT with s/s consistent with B SI arthritis involvement  Pt presents with limited L/S mobility and core strength deficits, decreased B LE strength, impaired soft tissue mobility/limited flexibility, decreased postural awareness, and gait/balance dysfunctions  Pt with limitations with prolonged ambulation, difficulty with stair negotiation, disrupted sleep patterns, and difficulty with lifting/carrying  Pt continues to work and complete recreational activities but notes increased pain with prolonged walking and sit to stand transitioning  He does intermittent use SI stability belt which does provide some relief  Pt would benefit from skilled therapy services to address outlined impairments, work towards goals, and restore pts PLOF  Thank you! UPDATE: pt notes that he is feeling better but continues to have pain with prolonged [address] activities  He also continues to rely on SI belt for work activities  He is demonstrating improved spinal mobility with less pain noted  PT to continue to with program progression with focus on core and pelvic stabilization  Thank you for this referral    Impairments: abnormal gait, abnormal movement, activity intolerance, impaired balance, impaired physical strength, pain with function, weight-bearing intolerance, poor posture  and poor body mechanics  Understanding of Dx/Px/POC: good   Prognosis: good    Goals  STGs to be achieved in 4 weeks:  -Pt to demonstrate reduced subjective pain rating "at worst" by at least 2-3 points from Initial Eval to allow for reduced pain with ADLs and improved functional activity tolerance   - MET  -Pt to demonstrate L/S ROM improved WFL in order to maximize joint mobility and function and allow for progression of exercise program and achievement of goals  - ongoing   -Pt to demonstrate increased MMT of BLE by at least 1/2 grade in order to improve safety and stability with ADLs and functional mobility  - ongoing     LTGs to be achieved upon discharge:   -Pt will be I with HEP in order to continue to improve quality of life and independence and reduce risk for re-injury    -Pt to demonstrate return to activities of daily living without limiations or restrictions    -Pt will return to ambulation > 1 hour without pain to help facilitate return to community activities independently   -Pt to demonstrate return to work activities without limitations or restrictions    -Pt to demonstrate improved function as noted by achieving or exceeding predicted score on FOTO outcomes assessment tool  Plan  Patient would benefit from: skilled physical therapy  Planned modality interventions: cryotherapy and thermotherapy: hydrocollator packs  Planned therapy interventions: abdominal trunk stabilization, balance, manual therapy, neuromuscular re-education, patient education, postural training, therapeutic activities, therapeutic exercise, home exercise program, flexibility and functional ROM exercises  Frequency: 2x week  Duration in weeks: 4  Plan of Care beginning date: 12/9/2022  Plan of Care expiration date: 1/9/2023  Treatment plan discussed with: patient and referring physician        Subjective Evaluation    History of Present Illness  Mechanism of injury: Pt reporting that he has been having lower back pain for the past 4-5 years  He has had series of injections with temporary, 4-6 week, relief only  He is seeing the chiropractor but notes he usual only gets 1 day of relief and then feels stiff again the next morning   He is seeing Orthopedic who doesn't want to keep doing injections and is referring to [facility]  for conservative management of s/s  UPDATE: pt notes that he is feeling better but not 100% yet, feels he could benefit from more therapy  Quality of life: good    Pain  At best pain ratin  At worst pain ratin  Quality: dull ache and sharp  Relieving factors: medications, heat and ice (OTC prn )  Progression: no change    Social Support  Stairs in house: yes   Lives in: multiple-level home  Lives with: spouse    Employment status: working    Diagnostic Tests  MRI studies: abnormal  Treatments  Previous treatment: injection treatment  Current treatment: physical therapy  Patient Goals  Patient goals for therapy: decreased edema, decreased pain, improved balance, increased motion, increased strength and independence with ADLs/IADLs          Objective     Concurrent Complaints  Positive for disturbed sleep  Negative for bladder dysfunction, bowel dysfunction and saddle (S4) numbness    Postural Observations  Seated posture: fair  Standing posture: good        Palpation   Left   Tenderness of the erector spinae and piriformis  Right   Tenderness of the erector spinae and piriformis  Tenderness     Lumbar Spine  Tenderness in the facet joint  Left Hip   Tenderness in the PSIS  Right Hip   Tenderness in the PSIS       Neurological Testing     Sensation     Lumbar   Left   Intact: light touch    Right   Intact: light touch    Active Range of Motion     Lumbar   Flexion:  with pain Restriction level: minimal  Extension:  Restriction level: minimal  Left lateral flexion:  Restriction level: minimal  Right lateral flexion:  Restriction level: minimal  Left Hip   Flexion: WFL and with pain  Abduction: WFL    Right Hip   Flexion: WFL and with pain  Abduction: WFL  Left Knee   Flexion: WFL  Extension: WFL    Right Knee   Flexion: WFL  Extension: WFL    Strength/Myotome Testing     Left Hip   Planes of Motion   Flexion: 4-  Abduction: 4  Adduction: 4    Right Hip   Planes of Motion   Flexion: 4-  Abduction: 4  Adduction: 4    Left Knee   Flexion: 4+  Extension: 4+    Right Knee   Flexion: 4+  Extension: 4+    Tests     Left Hip   Positive piriformis and SI compression  Right Hip   Positive piriformis and SI compression       Ambulation     Ambulation: Level Surfaces     Additional Level Surfaces Ambulation Details  Tolerance > 2-3 hours but with increase in pain; use of SI belt for added stability and pain relief     Ambulation: Stairs   Pattern: reciprocal  Pattern: reciprocal             Re-eval Date: 12/10/22     Precautions: None       Manuals 11/28 11/30 12/6 12/9                                     Neuro Re-Ed         PPT 5"x10 5"x15 5"x 15     PPT with marches  3"x10 3"x10 ea 3"x 10 ea 3" x 15 ea     TA    Admin      TA + OH lifts    2# DB  5" x 10     MTP/LTP Black x15 ea  Black 20x ea Black x 20 ea      Palloff press  Green 1x10 R/L Green 1x10 R/L Black 1x10 R/L     SI joint dysfunction pathology - avoidance of aggravating movement patterns         Ther Ex        NuStep  L3 10 minutes  L3 10 min L3 10 min  L3 10 minutes     Standing hip flex/abd/ext X 15 ea Jonathan  X 15 ea Jonathan  1 x15 ea Jonathan  X 15 ea  #    Squats  X 15 X 15 x15  X 15     Step-ups  C Fwd 1x15 C Fwd/Lat 1x15 ea Jonathan C Fwd/Lat   1x 15 ea Jonathan C Fwd/Lat   X 15 ea  D    LTR 10"x10 ea  10"x10 ea 10"x 10 ea  10" x 10 ea     SKTC 5"x5 ea  5"x5 ea  5"x 5 ea      SLR x15 ea Jonathan  x15 ea Jonathan  X 20ea Jonathan X 20 ea jonathan     S/L SLR X 15 ea Jonathan x15 ea Jonathan  X 20 ea Jonathan  X 20 ea jonathan     Clamshells  X 15 ea Jonathan  x15 ea Jonathan  X 20 ea Jonathan X 20 ea jonathan     Heel walk outs  x10 x10 X 10     Bridges  3"x20 5"x20 5"x20 5" x 20                             Ther Activity                        Gait Training                        Modalities        CP

## 2022-12-12 ENCOUNTER — APPOINTMENT (OUTPATIENT)
Dept: PHYSICAL THERAPY | Facility: HOME HEALTHCARE | Age: 52
End: 2022-12-12

## 2022-12-14 ENCOUNTER — OFFICE VISIT (OUTPATIENT)
Dept: PHYSICAL THERAPY | Facility: HOME HEALTHCARE | Age: 52
End: 2022-12-14

## 2022-12-14 DIAGNOSIS — M54.50 CHRONIC BILATERAL LOW BACK PAIN WITHOUT SCIATICA: Primary | ICD-10-CM

## 2022-12-14 DIAGNOSIS — G89.29 CHRONIC BILATERAL LOW BACK PAIN WITHOUT SCIATICA: Primary | ICD-10-CM

## 2022-12-14 NOTE — PROGRESS NOTES
Daily Note     Today's date: 2022  Patient name: Zaira Smith  : 1970  MRN: [id number]  Referring provider: Rhys Payan MD  Dx: No diagnosis found  Start Time: 745          Subjective: I am just stiff this morning  Last night I was pretty sore from sitting a lot yesterday so I used CP and TENS unit at home which helps  Objective: See treatment diary below    Assessment: Challenged pt with addition of wt and increased step ht without c/o increased s/s  Pt progressing well toward I HEP and able to complete TE with minimal vc's for correct form  Patient would benefit from continued PT    Plan: Continue per plan of care        Re-eval Date: 12/10/22     Precautions: None       Manuals -                                   Neuro Re-Ed         PPT 5"x10 5"x15 5"x 15     PPT with marches  3"x10 3"x10 ea 3"x 10 ea 3" x 15 ea  3" x15 ea   TA    Admin      TA + OH lifts    2# DB  5" x 10  2# DB  5" x10   MTP/LTP Black x15 ea  Black 20x ea Black x 20 ea   Black x20 ea   [employer] press  Green 1x10 R/L Green 1x10 R/L Black 1x10 R/L  Black 1x10 R/L   SI joint dysfunction pathology - avoidance of aggravating movement patterns         Ther Ex     12-14   NuStep  L3 10 minutes  L3 10 min L3 10 min  L3 10 minutes  L 3  10'   Standing hip flex/abd/ext X 15 ea Jonathan  X 15 ea Jonathan  1 x15 ea Jonathan  X 15 ea  2# x15 ea   Squats  X 15 X 15 x15  X 15  x15   Step-ups  C Fwd 1x15 C Fwd/Lat 1x15 ea Jonathan C Fwd/Lat   1x 15 ea Jonathan C Fwd/Lat   X 15 ea  D Fwd/Lat  15 ea   LTR 10"x10 ea  10"x10 ea 10"x 10 ea  10" x 10 ea  10" x10   SKTC 5"x5 ea  5"x5 ea  5"x 5 ea      SLR x15 ea Jonathan  x15 ea Jonathan  X 20ea Jonathan X 20 ea jonathan  x20 ea jonathan   S/L SLR X 15 ea Jonathan x15 ea Jonathan  X 20 ea Jonathan  X 20 ea jonathan  x20 ea jonathan   Clamshells  X 15 ea Jonathan  x15 ea Jonathan  X 20 ea Jonathan X 20 ea jonathan  x20 ea jonathan   Heel walk outs  x10 x10 X 10     Bridges  3"x20 5"x20 5"x20 5" x 20  5" x20                           Ther Activity Gait Training                        Modalities        CP

## 2022-12-14 NOTE — PROGRESS NOTES
Daily Note     Today's date: 12/15/2022  Patient name: Sherice Stone  : 1970  MRN: [id number]  Referring provider: Sofia Macias MD  Dx:   Encounter Diagnosis     ICD-10-CM    1  Chronic bilateral low back pain without sciatica  M54 50     G89 29                      Subjective: The patient states that his back is stiff this morning and he has some "pinching" in it  Objective: See treatment diary below      Assessment: Tolerated treatment well with no increase in pain noted during session  Some verbal cues needed for correct form with completing TE  He reports decreased stiffness at end of session and less pinching  Patient would benefit from continued PT to improve function  Plan: Continue per plan of care        Re-eval Date: 23     Precautions: None       Manuals 12/15 11/30 12/6 12/9  12-                                   Neuro Re-Ed         PPT  5"x15 5"x 15     PPT with marches  3" x 15 ea 3"x10 ea 3"x 10 ea 3" x 15 ea  3" x15 ea   TA    Admin      TA + OH lifts 2# DB  5" x 10   2# DB  5" x 10  2# DB  5" x10   MTP/LTP Black x20 ea  Black 20x ea Black x 20 ea   Black x20 ea   Palloff press  Black 1x10 R/L Green 1x10 R/L Black 1x10 R/L  Black 1x10 R/L   SI joint dysfunction pathology - avoidance of aggravating movement patterns         Ther Ex     12-14   NuStep  L3 10 minutes  L3 10 min L3 10 min  L3 10 minutes  L 3  10'   Standing hip flex/abd/ext 2# 15x ea Jonathan  X 15 ea Jonathan  1 x15 ea Jonathan  X 15 ea  2# x15 ea   Squats  15x X 15 x15  X 15  x15   Step-ups  D Fwd/Lat 1x15 ea C Fwd/Lat 1x15 ea Jonathan C Fwd/Lat   1x 15 ea Jonathan C Fwd/Lat   X 15 ea  D Fwd/Lat  15 ea   LTR 10"x10 ea  10"x10 ea 10"x 10 ea  10" x 10 ea  10" x10   SKTC  5"x5 ea  5"x 5 ea      SLR x20 ea Jonathan  x15 ea Jonathan  X 20ea Jonathan X 20 ea jonathan  x20 ea jonathan   S/L SLR x20 ea Jonathan x15 ea Jonathan  X 20 ea Jonathan  X 20 ea jonathan  x20 ea jonathan   Clamshells  x20 ea Jonathan  x15 ea Jonathan  X 20 ea Jonathan X 20 ea jonathan  x20 ea jonathan   Heel walk outs   x10 X 10 Bridges  5" x 20 5"x20 5"x20 5" x 20  5" x20                           Ther Activity                        Gait Training                        Modalities        CP

## 2022-12-15 ENCOUNTER — OFFICE VISIT (OUTPATIENT)
Dept: PHYSICAL THERAPY | Facility: HOME HEALTHCARE | Age: 52
End: 2022-12-15

## 2022-12-15 ENCOUNTER — APPOINTMENT (OUTPATIENT)
Dept: PHYSICAL THERAPY | Facility: HOME HEALTHCARE | Age: 52
End: 2022-12-15

## 2022-12-15 DIAGNOSIS — G89.29 CHRONIC BILATERAL LOW BACK PAIN WITHOUT SCIATICA: Primary | ICD-10-CM

## 2022-12-15 DIAGNOSIS — M54.50 CHRONIC BILATERAL LOW BACK PAIN WITHOUT SCIATICA: Primary | ICD-10-CM

## 2022-12-19 ENCOUNTER — OFFICE VISIT (OUTPATIENT)
Dept: PHYSICAL THERAPY | Facility: HOME HEALTHCARE | Age: 52
End: 2022-12-19

## 2022-12-19 DIAGNOSIS — G89.29 CHRONIC BILATERAL LOW BACK PAIN WITHOUT SCIATICA: Primary | ICD-10-CM

## 2022-12-19 DIAGNOSIS — M54.50 CHRONIC BILATERAL LOW BACK PAIN WITHOUT SCIATICA: Primary | ICD-10-CM

## 2022-12-19 NOTE — PROGRESS NOTES
Daily Note     Today's date: 2022  Patient name: Yamilka Nelson  : 1970  MRN: [id number]  Referring provider: Carlos Morales MD  Dx: No diagnosis found  Start Time: 0700          Subjective: I over did it on Saturday and I had pain of 6/10 that evening  I have been using CP which helps to reduce pain to about 5/10  I do notice over all reduction in pain levels since SOC  Objective: See treatment diary below    Assessment: Pt without c/o increased pain level t/o session and requires minimal vc's for correct form  Pt does report fatigue with SL Abd  Pt is able to modify/alternete ex independently to avoid exacerbation  Pt progressing well toward I HEP and reports consistency with HEP, self stretch and CP  Patient would benefit from continued PT    Plan: Continue per plan of care        Re-eval Date: 23     Precautions: None       Manuals 12/15 12-19 12/6 12/9  12-14                                   Neuro Re-Ed         PPT   5"x 15     PPT with marches  3" x 15 ea 3"x15 ea 3"x 10 ea 3" x 15 ea  3" x15 ea   TA    Admin      TA + OH lifts 2# DB  5" x 10 2# DB   5" x10  2# DB  5" x 10  2# DB  5" x10   MTP/LTP Black x20 ea  Black 20x ea Black x 20 ea   Black x20 ea   Palloff press  Black 1x10 R/L Black  1x10 R/L Black 1x10 R/L  Black 1x10 R/L   SI joint dysfunction pathology - avoidance of aggravating movement patterns         Ther Ex     NuStep  L3 10 minutes  L3 10 min L3 10 min  L3 10 minutes  L 3  10'   Standing hip flex/abd/ext 2# 15x ea Jonathan  X 15 ea Jonathan  1 x15 ea Jonathan  X 15 ea  2# x15 ea   Squats  15x X 15 x15  X 15  x15   Step-ups  D Fwd/Lat 1x15 ea D Fwd/lat  1x15 ea C Fwd/Lat   1x 15 ea Jonathan C Fwd/Lat   X 15 ea  D Fwd/Lat  15 ea   LTR 10"x10 ea  10"x10 ea 10"x 10 ea  10" x 10 ea  10" x10   SKTC  5"x5 ea  5"x 5 ea      SLR x20 ea Jonathan  x20 ea Jonathan  X 20ea Jonathan X 20 ea jonathan  x20 ea jonathan   S/L SLR x20 ea [place] x20 ea Jonathan  X 20 ea Jonathan  X 20 ea jonathan  x20 ea jonathan   Clamshells  x20 ea Jonathan  x20 ea Jonathan  X 20 ea Jonathan X 20 ea jonathan  x20 ea jonathan   Heel walk outs    X 10     Bridges  5" x 20 5"x20 5"x20 5" x 20  5" x20                           Ther Activity                        Gait Training                        Modalities        CP

## 2022-12-23 ENCOUNTER — APPOINTMENT (OUTPATIENT)
Dept: PHYSICAL THERAPY | Facility: HOME HEALTHCARE | Age: 52
End: 2022-12-23

## 2022-12-27 ENCOUNTER — OFFICE VISIT (OUTPATIENT)
Dept: PHYSICAL THERAPY | Facility: HOME HEALTHCARE | Age: 52
End: 2022-12-27

## 2022-12-27 DIAGNOSIS — M54.50 CHRONIC BILATERAL LOW BACK PAIN WITHOUT SCIATICA: Primary | ICD-10-CM

## 2022-12-27 DIAGNOSIS — G89.29 CHRONIC BILATERAL LOW BACK PAIN WITHOUT SCIATICA: Primary | ICD-10-CM

## 2022-12-27 NOTE — PROGRESS NOTES
Daily Note     Today's date: 2022  Patient name: Dior Lunsford  : 1970  MRN: [id number]  Referring provider: Jaime Cuevas MD  Dx:   Encounter Diagnosis     ICD-10-CM    1  Chronic bilateral low back pain without sciatica  M54 50     G89 29                      Subjective: Patient reports his back has been relatively good recently  He says he has felt a twinge here and there, but has not had any constant pain over the past couple days  Objective: See treatment diary below      Assessment: Tolerated treatment well with no increase in symptoms other than fatigue  Patient required few verbal cues to perform exercises with appropraite technique and intensity  Patient would benefit from continued PT to increase trunk mobility and core strength for improved function in ADLs  Plan: Continue per plan of care        Re-eval Date: 23     Precautions: None       Manuals 12/15 12-19 12/27  12-14                                   Neuro Re-Ed         PPT        PPT with marches  3" x 15 ea 3"x15 ea 3"x15 ea  3" x15 ea   TA        TA + OH lifts 2# DB  5" x 10 2# DB   5" x10 2# DB   5" x10  2# DB  5" x10   MTP/LTP Black x20 ea  Black 20x ea Black 20x ea  Black x20 ea   Palloff press  Black 1x10 R/L Black  1x10 R/L Black  1x10 R/L  Black 1x10 R/L   SI joint dysfunction pathology - avoidance of aggravating movement patterns         Ther Ex     NuStep  L3 10 minutes  L3 10 min L3 10 min  L 3  10'   Standing hip flex/abd/ext 2# 15x ea Kiko  X 15 ea Kiko  2# 15x ea Kiko  2# x15 ea   Squats  15x X 15 x20  x15   Step-ups  D Fwd/Lat 1x15 ea D Fwd/lat  1x15 ea D Fwd/lat  1x15 ea  D Fwd/Lat  15 ea   LTR 10"x10 ea  10"x10 ea 10"x10 ea  10" x10   SKTC  5"x5 ea       SLR x20 ea Kiko  x20 ea Kiko  x20 ea Kiko  x20 ea kiko   S/L SLR x20 ea Kiko x20 ea Kiko  x20 ea Kiko  x20 ea kiko   Clamshells  x20 ea Kiko  x20 ea Kiko  x20 ea Kiko  x20 ea kiko   Heel walk outs         Bridges  5" x 20 5"x20 5"x20  5" x20 Ther Activity                        Gait Training                        Modalities        CP

## 2022-12-29 ENCOUNTER — OFFICE VISIT (OUTPATIENT)
Dept: PHYSICAL THERAPY | Facility: HOME HEALTHCARE | Age: 52
End: 2022-12-29

## 2022-12-29 DIAGNOSIS — G89.29 CHRONIC BILATERAL LOW BACK PAIN WITHOUT SCIATICA: Primary | ICD-10-CM

## 2022-12-29 DIAGNOSIS — M54.50 CHRONIC BILATERAL LOW BACK PAIN WITHOUT SCIATICA: Primary | ICD-10-CM

## 2022-12-29 NOTE — PROGRESS NOTES
Daily Note     Today's date: 2022  Patient name: Lakeisha Wong  : 1970  MRN: [id number]  Referring provider: Arthur Salas MD  Dx:   Encounter Diagnosis     ICD-10-CM    1  Chronic bilateral low back pain without sciatica  M54 50     G89 29           Start Time: 1615  Stop Time: 1653  Total time in clinic (min): 38 minutes    Subjective: Pt reports he has some pain L side of his LB  Objective: See treatment diary below      Assessment: Pt demonstrated good tolerance to treatment session  He was able to perform his exercises with relatively good form and with minimal symptoms  Pt would benefit from continued PT  Plan: Continue per plan of care        Re-eval Date: 23     Precautions: None       Manuals 12/15 12-19 12/27 12/29 12-14                                   Neuro Re-Ed         PPT        PPT with marches  3" x 15 ea 3"x15 ea 3"x15 ea 3"x15 ea 3" x15 ea   TA        TA + OH lifts 2# DB  5" x 10 2# DB   5" x10 2# DB   5" x10 2# DB   5" x10 2# DB  5" x10   MTP/LTP Black x20 ea  Black 20x ea Black 20x ea Black 20x ea Black x20 ea   Palloff press  Black 1x10 R/L Black  1x10 R/L Black  1x10 R/L Black 1x10 R/L Black 1x10 R/L   SI joint dysfunction pathology - avoidance of aggravating movement patterns         Ther Ex     NuStep  L3 10 minutes  L3 10 min L3 10 min L3 10 min L 3  10'   Standing hip flex/abd/ext 2# 15x ea Kiko  X 15 ea Kiko  2# 15x ea Kiko 2 5# 15x ea Kiko 2# x15 ea   Squats  15x X 15 x20 x20 x15   Step-ups  D Fwd/Lat 1x15 ea D Fwd/lat  1x15 ea D Fwd/lat  1x15 ea D Fwd/lat 1x15 ea D Fwd/Lat  15 ea   LTR 10"x10 ea  10"x10 ea 10"x10 ea  10" x10   SKTC  5"x5 ea       SLR x20 ea Kiko  x20 ea Kiko  x20 ea Kiko x20 ea Kiko x20 ea kiko   S/L SLR x20 ea Kiko x20 ea Kiko  x20 ea Kiko x20 ea Kiko x20 ea kiko   Clamshells  x20 ea Kiko  x20 ea Kiko  x20 ea Kiko x20 ea Kiko x20 ea kiko   Heel walk outs         Bridges  5" x 20 5"x20 5"x20 5"x20 5" x20                           Ther Activity                        Gait Training                        Modalities        CP

## 2023-01-03 ENCOUNTER — OFFICE VISIT (OUTPATIENT)
Dept: PHYSICAL THERAPY | Facility: HOME HEALTHCARE | Age: 53
End: 2023-01-03

## 2023-01-03 DIAGNOSIS — G89.29 CHRONIC BILATERAL LOW BACK PAIN WITHOUT SCIATICA: Primary | ICD-10-CM

## 2023-01-03 DIAGNOSIS — M54.50 CHRONIC BILATERAL LOW BACK PAIN WITHOUT SCIATICA: Primary | ICD-10-CM

## 2023-01-03 NOTE — PROGRESS NOTES
Daily Note     Today's date: 1/3/2023  Patient name: Brant Livingston  : 1970  MRN: [id number]  Referring provider: Toby Jimenez MD  Dx: No diagnosis found  Start Time: 1435          Subjective: I get an occasional quick, sharp jab of pain at my L hip/buttock area and the it goes away quickly  Objective: See treatment diary below    Assessment: Pt without c/o LB or L hip/buttock pain t/o ex and was able to complete entire session with min S or vc's needed  Pt progressing well toward I HEP and DC from PT  Pt reports understanding and consistency with HEP  Patient would benefit from continued PT to improve core control and lumbar stability  Plan: Continue per plan of care        Re-eval Date: 23     Precautions: None       Manuals 12/15 12-19 12/27 12/29 1-3                                   Neuro Re-Ed      1-3   PPT        PPT with marches  3" x 15 ea 3"x15 ea 3"x15 ea 3"x15 ea 3" x15 ea   TA        TA + OH lifts 2# DB  5" x 10 2# DB   5" x10 2# DB   5" x10 2# DB   5" x10 2# DB  5" x10   MTP/LTP Black x20 ea  Black 20x ea Black 20x ea Black 20x ea Black x20 ea   Palloff press  Black 1x10 R/L Black  1x10 R/L Black  1x10 R/L Black 1x10 R/L Black 1x10 R/L   SI joint dysfunction pathology - avoidance of aggravating movement patterns         Ther Ex     1-3   NuStep  L3 10 minutes  L3 10 min L3 10 min L3 10 min L 3  10'   Standing hip flex/abd/ext 2# 15x ea Kiko  X 15 ea Kiko  2# 15x ea Kiko 2 5# 15x ea Kiko 2 5# 15   ea kiko   Squats  15x X 15 x20 x20 x20   Step-ups  D Fwd/Lat 1x15 ea D Fwd/lat  1x15 ea D Fwd/lat  1x15 ea D Fwd/lat 1x15 ea D Fwd/Lat  15 ea   LTR 10"x10 ea  10"x10 ea 10"x10 ea  10" x10   SKTC  5"x5 ea       SLR x20 ea Kiko  x20 ea Kiko  x20 ea Kiko x20 ea Kiko x20 ea kiko   S/L SLR x20 ea Kiko x20 ea Kiko  x20 ea Kiko x20 ea Kiko x20 ea kiko   Clamshells  x20 ea Kiko  x20 ea Kiko  x20 ea Kiko x20 ea Kiko x20 ea kiko   Heel walk outs         Bridges  5" x 20 5"x20 5"x20 5"x20 5" x20 Ther Activity                        Gait Training                        Modalities        CP

## 2023-01-05 ENCOUNTER — OFFICE VISIT (OUTPATIENT)
Dept: PHYSICAL THERAPY | Facility: HOME HEALTHCARE | Age: 53
End: 2023-01-05

## 2023-01-05 DIAGNOSIS — M54.50 CHRONIC BILATERAL LOW BACK PAIN WITHOUT SCIATICA: Primary | ICD-10-CM

## 2023-01-05 DIAGNOSIS — G89.29 CHRONIC BILATERAL LOW BACK PAIN WITHOUT SCIATICA: Primary | ICD-10-CM

## 2023-01-05 NOTE — PROGRESS NOTES
Daily Note     Today's date: 2023  Patient name: Jennifer Loya  : 1970  MRN: [id number]  Referring provider: Anupam West MD  Dx:   Encounter Diagnosis     ICD-10-CM    1  Chronic bilateral low back pain without sciatica  M54 50     G89 29                  Subjective: Pt reports he is doing ok today  Pt reports he had some pain in his LB this morning  Objective: See treatment diary below      Assessment: Tolerated treatment well  Pt reports a little soreness LB with exercise  Minimal verbal cues needed t/o session  Patient would benefit from continued PT      Plan: Continue per plan of care        Re-eval Date: 23     Precautions: None       Manuals  1-3                                   Neuro Re-Ed      1-3   PPT        PPT with marches  3" x 15 ea 3"x15 ea 3"x15 ea 3"x15 ea 3" x15 ea   TA        TA + OH lifts 2 2 # red ball 5" x 10 2# DB   5" x10 2# DB   5" x10 2# DB   5" x10 2# DB  5" x10   MTP/LTP Black x20 ea  Black 20x ea Black 20x ea Black 20x ea Black x20 ea   Palloff press  Black 1x10 R/L Black  1x10 R/L Black  1x10 R/L Black 1x10 R/L Black 1x10 R/L   SI joint dysfunction pathology - avoidance of aggravating movement patterns         Ther Ex     1-3   NuStep  L3 10 minutes  L3 10 min L3 10 min L3 10 min L 3  10'   Standing hip flex/abd/ext 2 5# 15x ea Kiko  X 15 ea Kiko  2# 15x ea Kiko 2 5# 15x ea Kiko 2 5# 15   ea kiko   Squats  X 20 X 15 x20 x20 x20   Step-ups  D Fwd/Lat 1x15 ea D Fwd/lat  1x15 ea D Fwd/lat  1x15 ea D Fwd/lat 1x15 ea D Fwd/Lat  15 ea   LTR 10"x10 ea  10"x10 ea 10"x10 ea  10" x10   SKTC  5"x5 ea       SLR x20 ea Kiko  x20 ea Kiko  x20 ea Kiko x20 ea Kiko x20 ea kiko   S/L SLR x20 ea Kiko x20 ea Kiko  x20 ea Kiko x20 ea Kiko x20 ea kiko   Clamshells  x20 ea Kiko  x20 ea Kiko  x20 ea Kiko x20 ea Kiko x20 ea kiko   Heel walk outs         Bridges  5" x 20 5"x20 5"x20 5"x20 5" x20                           Ther Activity                        Gait Training Modalities        CP

## 2023-01-06 ENCOUNTER — APPOINTMENT (OUTPATIENT)
Dept: LAB | Facility: CLINIC | Age: 53
End: 2023-01-06

## 2023-01-06 DIAGNOSIS — I15.8 OTHER SECONDARY HYPERTENSION: Primary | ICD-10-CM

## 2023-01-06 LAB
25(OH)D3 SERPL-MCNC: 18.4 NG/ML (ref 30–100)
ALBUMIN SERPL BCP-MCNC: 4.1 G/DL (ref 3.5–5)
ALP SERPL-CCNC: 80 U/L (ref 46–116)
ALT SERPL W P-5'-P-CCNC: 77 U/L (ref 12–78)
ANION GAP SERPL CALCULATED.3IONS-SCNC: 7 MMOL/L (ref 4–13)
AST SERPL W P-5'-P-CCNC: 54 U/L (ref 5–45)
BILIRUB SERPL-MCNC: 0.44 MG/DL (ref 0.2–1)
BUN SERPL-MCNC: 12 MG/DL (ref 5–25)
CALCIUM SERPL-MCNC: 9 MG/DL (ref 8.3–10.1)
CHLORIDE SERPL-SCNC: 100 MMOL/L (ref 96–108)
CHOLEST SERPL-MCNC: 234 MG/DL
CO2 SERPL-SCNC: 28 MMOL/L (ref 21–32)
CREAT SERPL-MCNC: 0.88 MG/DL (ref 0.6–1.3)
ERYTHROCYTE [DISTWIDTH] IN BLOOD BY AUTOMATED COUNT: 14.7 % (ref 11.6–15.1)
FERRITIN SERPL-MCNC: 14 NG/ML (ref 8–388)
FOLATE SERPL-MCNC: >20 NG/ML (ref 3.1–17.5)
GFR SERPL CREATININE-BSD FRML MDRD: 100 ML/MIN/1.73SQ M
GLUCOSE P FAST SERPL-MCNC: 102 MG/DL (ref 65–99)
HCT VFR BLD AUTO: 38.9 % (ref 36.5–49.3)
HDLC SERPL-MCNC: 78 MG/DL
HGB BLD-MCNC: 12.6 G/DL (ref 12–17)
HGB RETIC QN AUTO: 29.7 PG (ref 30–38.3)
IMM RETICS NFR: 12 % (ref 0–14)
IRON SATN MFR SERPL: 15 % (ref 20–50)
IRON SERPL-MCNC: 75 UG/DL (ref 65–175)
LDLC SERPL CALC-MCNC: 131 MG/DL (ref 0–100)
MCH RBC QN AUTO: 27.9 PG (ref 26.8–34.3)
MCHC RBC AUTO-ENTMCNC: 32.4 G/DL (ref 31.4–37.4)
MCV RBC AUTO: 86 FL (ref 82–98)
NONHDLC SERPL-MCNC: 156 MG/DL
PLATELET # BLD AUTO: 425 THOUSANDS/UL (ref 149–390)
PMV BLD AUTO: 9.9 FL (ref 8.9–12.7)
POTASSIUM SERPL-SCNC: 4.2 MMOL/L (ref 3.5–5.3)
PROT SERPL-MCNC: 7.2 G/DL (ref 6.4–8.4)
PSA SERPL-MCNC: 0.6 NG/ML (ref 0–4)
RBC # BLD AUTO: 4.51 MILLION/UL (ref 3.88–5.62)
RETICS # AUTO: ABNORMAL 10*3/UL (ref 14356–105094)
RETICS # CALC: 1.44 % (ref 0.37–1.87)
SODIUM SERPL-SCNC: 135 MMOL/L (ref 135–147)
T4 FREE SERPL-MCNC: 0.89 NG/DL (ref 0.76–1.46)
TIBC SERPL-MCNC: 486 UG/DL (ref 250–450)
TRIGL SERPL-MCNC: 124 MG/DL
TSH SERPL DL<=0.05 MIU/L-ACNC: 5.9 UIU/ML (ref 0.45–4.5)
VIT B12 SERPL-MCNC: 246 PG/ML (ref 100–900)
WBC # BLD AUTO: 6.34 THOUSAND/UL (ref 4.31–10.16)

## 2023-01-07 LAB — BACTERIA UR CULT: NORMAL

## 2023-01-09 LAB
EST. AVERAGE GLUCOSE BLD GHB EST-MCNC: 128 MG/DL
FOLATE BLD-MCNC: >620 NG/ML
FOLATE RBC-MCNC: >1570 NG/ML
HBA1C MFR BLD: 6.1 %
HCT VFR BLD AUTO: 39.5 % (ref 37.5–51)

## 2023-01-12 ENCOUNTER — EVALUATION (OUTPATIENT)
Dept: PHYSICAL THERAPY | Facility: HOME HEALTHCARE | Age: 53
End: 2023-01-12

## 2023-01-12 DIAGNOSIS — G89.29 CHRONIC BILATERAL LOW BACK PAIN WITHOUT SCIATICA: Primary | ICD-10-CM

## 2023-01-12 DIAGNOSIS — M54.50 CHRONIC BILATERAL LOW BACK PAIN WITHOUT SCIATICA: Primary | ICD-10-CM

## 2023-01-12 NOTE — LETTER
2023    [facility] [address] [facility]    Patient: Romeo Jade   YOB: 1970   Date of Visit: 2023     Encounter Diagnosis     ICD-10-CM    1  Chronic bilateral low back pain without sciatica  M54 50     G89 29           Dear Dr Jose Luis Pruitt:    Thank you for your recent referral of Romeo Jade  Please review the attached evaluation summary from Evan's recent visit  Please verify that you agree with the plan of care by signing the attached order  If you have any questions or concerns, please do not hesitate to call  I sincerely appreciate the opportunity to share in the care of one of your patients and hope to have another opportunity to work with you in the near future  Sincerely,    Wilson Fried, PT      Referring Provider:      I certify that I have read the below Plan of Care and certify the need for these services furnished under this plan of treatment while under my care  Lissa Polo MD  [facility]  [address] [place]          Daily Note     Today's date: 2023  Patient name: Romeo Jade  : 1970  MRN: [id number]  Referring provider: Angelica Sorensen MD  Dx: No diagnosis found  Start Time: 1616          Subjective: Occasional pinching sensation at L SI jt area when bending and twisting  Objective: See treatment diary below    Assessment: Tolerated treatment well  Added prone press up and prone hip ext to program with good liat  Also challenged pt with increased with of supine TA with OH lift  Pt progressing slowly and consistently with lumbar control and pain free flexibility  Patient would benefit from continued PT    Plan: Continue per plan of care  See RE for full findings  Trial of added IASTM next visit       Re-eval Date: 23     Precautions: None       Manuals  1- 1-3   IASTM  NV              Neuro Re-Ed 1-12   1-3   PPU  5" x5      Prone LE ext  1x5 kiko      PPT        PPT with marches  3" x 15 ea 3"x15 ea 3"x15 ea 3"x15 ea 3" x15 ea   TA        TA + OH lifts  supine 2 2 # red ball 5" x 10 3# DB   5" x10 2# DB   5" x10 2# DB   5" x10 2# DB  5" x10   MTP/LTP Black x20 ea  Black 20x ea Black 20x ea Black 20x ea Black x20 ea   Palloff press  Black 1x10 R/L Black  1x10 R/L Black  1x10 R/L Black 1x10 R/L Black 1x10 R/L   SI joint dysfunction pathology - avoidance of aggravating movement patterns         Ther Ex  1-12   1-3   NuStep  L3 10 minutes  L3 10' L3 10 min L3 10 min L 3  10'   Standing hip flex/abd/ext 2 5# 15x ea Kiko  2 5#    X 15 ea Kiko  2# 15x ea Kiko 2 5# 15x ea Kiko 2 5# 15   ea kiko   Squats  X 20 X 15 x20 x20 x20   Step-ups  D Fwd/Lat 1x15 ea C Fwd/lat  1x15 ea D Fwd/lat  1x15 ea D Fwd/lat 1x15 ea D Fwd/Lat  15 ea   LTR 10"x10 ea  10"x10 ea 10"x10 ea  10" x10   SKTC  5"x5 ea       SLR x20 ea Kiko  x20 ea Kiko  x20 ea Kiko x20 ea Kiko x20 ea kiko   S/L SLR x20 ea Kiko x20 ea Kiko  x20 ea Kiko x20 ea Kiko x20 ea kiko   Clamshells  x20 ea Kiko  x20 ea Kiko  x20 ea Kiko x20 ea Kiko x20 ea kiko   Heel walk outs         Bridges  5" x 20 5"x20 5"x20 5"x20 5" x20                           Ther Activity                        Gait Training                        Modalities        CP                                        PT Re-Evaluation     Today's date: 2023  Patient name: Kirill Lincoln  : 1970  MRN: [id number]  Referring provider: Lucretia Melissa MD  Dx:   Encounter Diagnosis     ICD-10-CM    1  Chronic bilateral low back pain without sciatica  M54 50     G89 29           Start Time: 1616  Stop Time: 1705  Total time in clinic (min): 49 minutes    Assessment  Assessment details: Pt has made improvement in pain, strength, ROM, and overall mobility since beginning PT    He has met his short-term goals for pain and LE strength, however, he is still progressing toward his long-term goals for pain, strength, ROM, and functional mobility  Although improving, the pt does continue to have some tenderness and mild trigger points/muscle spasms to the left side of his low back  The pt would benefit from continued PT in order to further improve in these areas and progress towards his goals  Impairments: abnormal gait, abnormal movement, activity intolerance, impaired balance, impaired physical strength, pain with function, weight-bearing intolerance, poor posture  and poor body mechanics  Understanding of Dx/Px/POC: good   Prognosis: good    Goals  STGs to be achieved in 4 weeks:  -Pt to demonstrate reduced subjective pain rating "at worst" by at least 2-3 points from Initial Eval to allow for reduced pain with ADLs and improved functional activity tolerance  - MET  -Pt to demonstrate L/S ROM improved WFL in order to maximize joint mobility and function and allow for progression of exercise program and achievement of goals  - ongoing   -Pt to demonstrate increased MMT of BLE by at least 1/2 grade in order to improve safety and stability with ADLs and functional mobility  - Met    LTGs to be achieved upon discharge:   -Pt will be I with HEP in order to continue to improve quality of life and independence and reduce risk for re-injury    -Pt to demonstrate return to activities of daily living without limiations or restrictions    -Pt will return to ambulation > 1 hour without pain to help facilitate return to community activities independently   -Pt to demonstrate return to work activities without limitations or restrictions    -Pt to demonstrate improved function as noted by achieving or exceeding predicted score on FOTO outcomes assessment tool            Plan  Patient would benefit from: skilled physical therapy  Planned modality interventions: cryotherapy and thermotherapy: hydrocollator packs  Planned therapy interventions: abdominal trunk stabilization, balance, manual therapy, neuromuscular re-education, patient education, postural training, therapeutic activities, therapeutic exercise, home exercise program, flexibility and functional ROM exercises  Frequency: 2x week  Duration in weeks: 4  Plan of Care beginning date: 2023  Plan of Care expiration date: 2023  Treatment plan discussed with: patient and referring physician        Subjective Evaluation    History of Present Illness  Mechanism of injury: Pt reporting that he has been having lower back pain for the past 4-5 years  He has had series of injections with temporary, 4-6 week, relief only  He is seeing the chiropractor but notes he usual only gets 1 day of relief and then feels stiff again the next morning  He is seeing Orthopedic who doesn't want to keep doing injections and is referring to OPPT for conservative management of s/s  UPDATE: pt notes that he is feeling better but not 100% yet, feels he could benefit from more therapy  UPDATE 2023:  Pt reports that his back has been feeling better than when he first started, however, the past week has been having a little more soreness on the L side of his low back  He reports that he notices it the most when standing or turning, otherwise, he feels like he was getting better  He reports that overall the pain has decreased though and that he does not get pain when sleeping anymore  Pt reports that the standing hip extension seems to flair up that spot on his back    Quality of life: good    Pain  At best pain ratin  At worst pain rating: 3  Quality: dull ache and sharp  Relieving factors: medications, heat and ice (OTC prn )  Progression: no change    Social Support  Stairs in house: yes   Lives in: multiple-level home  Lives with: spouse    Employment status: working    Diagnostic Tests  MRI studies: abnormal  Treatments  Previous treatment: injection treatment  Current treatment: physical therapy  Patient Goals  Patient goals for therapy: decreased edema, decreased pain, improved balance, increased motion, increased strength and independence with ADLs/IADLs          Objective     Concurrent Complaints  Positive for disturbed sleep  Negative for bladder dysfunction, bowel dysfunction and saddle (S4) numbness    Postural Observations  Seated posture: fair  Standing posture: good        Palpation   Left   Hypertonic in the lumbar paraspinals  Tenderness of the erector spinae, lumbar paraspinals and piriformis  Right   Tenderness of the piriformis  Tenderness     Lumbar Spine  Tenderness in the facet joint  Left Hip   Tenderness in the PSIS  Right Hip   Tenderness in the PSIS  Neurological Testing     Sensation     Lumbar   Left   Intact: light touch    Right   Intact: light touch    Active Range of Motion     Lumbar   Flexion:  WFL  Extension:  with pain Restriction level: minimal  Left lateral flexion:  Restriction level: minimal  Right lateral flexion:  Restriction level: minimal  Left Hip   Flexion: WFL and with pain  Abduction: WFL    Right Hip   Flexion: WFL and with pain  Abduction: WFL  Left Knee   Flexion: WFL  Extension: WFL    Right Knee   Flexion: WFL  Extension: WFL    Strength/Myotome Testing     Left Hip   Planes of Motion   Flexion: 4+  Abduction: 4+  Adduction: 5    Right Hip   Planes of Motion   Flexion: 4+  Abduction: 4+  Adduction: 5    Left Knee   Flexion: 4+  Extension: 4+    Right Knee   Flexion: 4+  Extension: 4+    Tests     Left Hip   Positive piriformis and SI compression  Right Hip   Positive piriformis and SI compression       Ambulation     Ambulation: Level Surfaces     Additional Level Surfaces Ambulation Details  Tolerance > 2-3 hours but with increase in pain; use of SI belt for added stability and pain relief     Ambulation: Stairs   Pattern: reciprocal  Pattern: reciprocal            Re-eval Date: 12/10/22     Precautions: None reciprocal  Pattern: reciprocal            Re-eval Date: 12/10/22     Precautions: None

## 2023-01-12 NOTE — PROGRESS NOTES
Daily Note     Today's date: 2023  Patient name: Doris Macario  : 1970  MRN: [id number]  Referring provider: Nubia Dougherty MD  Dx: No diagnosis found  Start Time:           Subjective: Occasional pinching sensation at L SI jt area when bending and twisting  Objective: See treatment diary below    Assessment: Tolerated treatment well  Added prone press up and prone hip ext to program with good liat  Also challenged pt with increased with of supine TA with OH lift  Pt progressing slowly and consistently with lumbar control and pain free flexibility  Patient would benefit from continued PT    Plan: Continue per plan of care  See RE for full findings  Trial of added IASTM next visit        Re-eval Date: 23     Precautions: None       Manuals  1-3   IASTM  NV              Neuro Re-Ed      1-3   PPU  5" x5      Prone LE ext  1x5 kiko      PPT        PPT with marches  3" x 15 ea 3"x15 ea 3"x15 ea 3"x15 ea 3" x15 ea   TA        TA + OH lifts  supine 2 2 # red ball 5" x 10 3# DB   5" x10 2# DB   5" x10 2# DB   5" x10 2# DB  5" x10   MTP/LTP Black x20 ea  Black 20x ea Black 20x ea Black 20x ea Black x20 ea   Palloff press  Black 1x10 R/L Black  1x10 R/L Black  1x10 R/L Black 1x10 R/L Black 1x10 R/L   SI joint dysfunction pathology - avoidance of aggravating movement patterns         Ther Ex  -   1-3   NuStep  L3 10 minutes  L3 10' L3 10 min L3 10 min L 3  10'   Standing hip flex/abd/ext 2 5# 15x ea Kiko  2 5#    X 15 ea Kiko  2# 15x ea Kiko 2 5# 15x ea Kiko 2 5# 15   ea kiko   Squats  X 20 X 15 x20 x20 x20   Step-ups  D Fwd/Lat 1x15 ea C Fwd/lat  1x15 ea D Fwd/lat  1x15 ea D Fwd/lat 1x15 ea D Fwd/Lat  15 ea   LTR 10"x10 ea  10"x10 ea 10"x10 ea  10" x10   SKTC  5"x5 ea       SLR x20 ea Kiko  x20 ea Kiko  x20 ea Kiko x20 ea Kiko x20 ea kiko   S/L SLR x20 ea Kiko x20 ea Kiko  x20 ea Kkio x20 ea Kiko x20 ea kiko   Clamshells  x20 ea Kiko  x20 ea Kiko  x20 ea Kiko x20 ea Kiko x20 ea kiko   Heel walk outs         Bridges  5" x 20 5"x20 5"x20 5"x20 5" x20                           Ther Activity                        Gait Training                        Modalities        CP

## 2023-01-12 NOTE — PROGRESS NOTES
PT Re-Evaluation     Today's date: 2023  Patient name: Rey Ontiveros  : 1970  MRN: [id number]  Referring provider: Omid Llanes MD  Dx:   Encounter Diagnosis     ICD-10-CM    1  Chronic bilateral low back pain without sciatica  M54 50     G89 29           Start Time: 1616  Stop Time: 1705  Total time in clinic (min): 49 minutes    Assessment  Assessment details: Pt has made improvement in pain, strength, ROM, and overall mobility since beginning PT  He has met his short-term goals for pain and LE strength, however, he is still progressing toward his long-term goals for pain, strength, ROM, and functional mobility  Although improving, the pt does continue to have some tenderness and mild trigger points/muscle spasms to the left side of his low back  The pt would benefit from continued PT in order to further improve in these areas and progress towards his goals  Impairments: abnormal gait, abnormal movement, activity intolerance, impaired balance, impaired physical strength, pain with function, weight-bearing intolerance, poor posture  and poor body mechanics  Understanding of Dx/Px/POC: good   Prognosis: good    Goals  STGs to be achieved in 4 weeks:  -Pt to demonstrate reduced subjective pain rating "at worst" by at least 2-3 points from Initial Eval to allow for reduced pain with ADLs and improved functional activity tolerance  - MET  -Pt to demonstrate L/S ROM improved WFL in order to maximize joint mobility and function and allow for progression of exercise program and achievement of goals   - ongoing   -Pt to demonstrate increased MMT of BLE by at least 1/2 grade in order to improve safety and stability with ADLs and functional mobility  - Met    LTGs to be achieved upon discharge:   -Pt will be I with HEP in order to continue to improve quality of life and independence and reduce risk for re-injury    -Pt to demonstrate return to activities of daily living without limiations or restrictions    -Pt will return to ambulation > 1 hour without pain to help facilitate return to community activities independently   -Pt to demonstrate return to work activities without limitations or restrictions    -Pt to demonstrate improved function as noted by achieving or exceeding predicted score on FOTO outcomes assessment tool  Plan  Patient would benefit from: skilled physical therapy  Planned modality interventions: cryotherapy and thermotherapy: hydrocollator packs  Planned therapy interventions: abdominal trunk stabilization, balance, manual therapy, neuromuscular re-education, patient education, postural training, therapeutic activities, therapeutic exercise, home exercise program, flexibility and functional ROM exercises  Frequency: 2x week  Duration in weeks: 4  Plan of Care beginning date: 1/12/2023  Plan of Care expiration date: 2/12/2023  Treatment plan discussed with: patient and referring physician        Subjective Evaluation    History of Present Illness  Mechanism of injury: Pt reporting that he has been having lower back pain for the past 4-5 years  He has had series of injections with temporary, 4-6 week, relief only  He is seeing the chiropractor but notes he usual only gets 1 day of relief and then feels stiff again the next morning  He is seeing Orthopedic who doesn't want to keep doing injections and is referring to OPPT for conservative management of s/s  UPDATE: pt notes that he is feeling better but not 100% yet, feels he could benefit from more therapy  UPDATE 01/12/2023:  Pt reports that his back has been feeling better than when he first started, however, the past week has been having a little more soreness on the L side of his low back  He reports that he notices it the most when standing or turning, otherwise, he feels like he was getting better  He reports that overall the pain has decreased though and that he does not get pain when sleeping anymore    Pt reports that the standing hip extension seems to flair up that spot on his back  Quality of life: good    Pain  At best pain ratin  At worst pain rating: 3  Quality: dull ache and sharp  Relieving factors: medications, heat and ice (OTC prn )  Progression: no change    Social Support  Stairs in house: yes   Lives in: multiple-level home  Lives with: spouse    Employment status: working    Diagnostic Tests  MRI studies: abnormal  Treatments  Previous treatment: injection treatment  Current treatment: physical therapy  Patient Goals  Patient goals for therapy: decreased edema, decreased pain, improved balance, increased motion, increased strength and independence with ADLs/IADLs          Objective     Concurrent Complaints  Positive for disturbed sleep  Negative for bladder dysfunction, bowel dysfunction and saddle (S4) numbness    Postural Observations  Seated posture: fair  Standing posture: good        Palpation   Left   Hypertonic in the lumbar paraspinals  Tenderness of the erector spinae, lumbar paraspinals and piriformis  Right   Tenderness of the piriformis  Tenderness     Lumbar Spine  Tenderness in the facet joint  Left Hip   Tenderness in the PSIS  Right Hip   Tenderness in the PSIS       Neurological Testing     Sensation     Lumbar   Left   Intact: light touch    Right   Intact: light touch    Active Range of Motion     Lumbar   Flexion:  WFL  Extension:  with pain Restriction level: minimal  Left lateral flexion:  Restriction level: minimal  Right lateral flexion:  Restriction level: minimal  Left Hip   Flexion: WFL and with pain  Abduction: WFL    Right Hip   Flexion: WFL and with pain  Abduction: WFL  Left Knee   Flexion: WFL  Extension: WFL    Right Knee   Flexion: WFL  Extension: WFL    Strength/Myotome Testing     Left Hip   Planes of Motion   Flexion: 4+  Abduction: 4+  Adduction: 5    Right Hip   Planes of Motion   Flexion: 4+  Abduction: 4+  Adduction: 5    Left Knee Flexion: 4+  Extension: 4+    Right Knee   Flexion: 4+  Extension: 4+    Tests     Left Hip   Positive piriformis and SI compression  Right Hip   Positive piriformis and SI compression       Ambulation     Ambulation: Level Surfaces     Additional Level Surfaces Ambulation Details  Tolerance > 2-3 hours but with increase in pain; use of SI belt for added stability and pain relief     Ambulation: Stairs   Pattern: reciprocal  Pattern: reciprocal             Re-eval Date: 12/10/22     Precautions: None

## 2023-01-16 ENCOUNTER — APPOINTMENT (OUTPATIENT)
Dept: PHYSICAL THERAPY | Facility: HOME HEALTHCARE | Age: 53
End: 2023-01-16

## 2023-01-19 ENCOUNTER — APPOINTMENT (OUTPATIENT)
Dept: PHYSICAL THERAPY | Facility: HOME HEALTHCARE | Age: 53
End: 2023-01-19

## 2023-01-23 ENCOUNTER — OFFICE VISIT (OUTPATIENT)
Dept: PHYSICAL THERAPY | Facility: HOME HEALTHCARE | Age: 53
End: 2023-01-23

## 2023-01-23 DIAGNOSIS — G89.29 CHRONIC BILATERAL LOW BACK PAIN WITHOUT SCIATICA: Primary | ICD-10-CM

## 2023-01-23 DIAGNOSIS — M54.50 CHRONIC BILATERAL LOW BACK PAIN WITHOUT SCIATICA: Primary | ICD-10-CM

## 2023-01-23 NOTE — PROGRESS NOTES
Daily Note     Today's date: 2023  Patient name: Yolanda Dorsey  : 1970  MRN: [id number]  Referring provider: Kiara Hernandez MD  Dx:   Encounter Diagnosis     ICD-10-CM    1  Chronic bilateral low back pain without sciatica  M54 50     G89 29                  Subjective: Pt reports on friday and yesterday he was having spasms L side of his LB  Objective: See treatment diary below      Assessment: Tolerated treatment well  Pt with no c/o pain or spasms in his back t/o session or at end of session  Trial of IASTM this session L side LB  No redness or bruising noted upon completion of IASTM  Patient would benefit from continued PT      Plan: Continue per plan of care        Re-eval Date: 23     Precautions: None       Manuals  1-3   IASTM  NV L LB 8'             Neuro Re-Ed      1-3   PPU  5" x5 5"x5     Prone LE ext  1x5 kiko 2x5 Kiko     PPT        PPT with marches  3" x 15 ea 3"x15 ea 3"x15 ea 3"x15 ea 3" x15 ea   TA        TA + OH lifts  supine 2 2 # red ball 5" x 10 3# DB   5" x10 3# DB   5" x10 2# DB   5" x10 2# DB  5" x10   MTP/LTP Black x20 ea  Black 20x ea Black 20x ea Black 20x ea Black x20 ea   Palloff press  Black 1x10 R/L Black  1x10 R/L Black  1x10 R/L Black 1x10 R/L Black 1x10 R/L   SI joint dysfunction pathology - avoidance of aggravating movement patterns         Ther Ex  -12   1-3   NuStep  L3 10 minutes  L3 10' L3 10 min L3 10 min L 3  10'   Standing hip flex/abd/ext 2 5# 15x ea Kiko  2 5#    X 15 ea Kiko  2 5# 15x ea Kiko 2 5# 15x ea Kiko 2 5# 15   ea kiko   Squats  X 20 X 15 x20 x20 x20   Step-ups  D Fwd/Lat 1x15 ea C Fwd/lat  1x15 ea D Fwd/lat  1x15 ea D Fwd/lat 1x15 ea D Fwd/Lat  15 ea   LTR 10"x10 ea  10"x10 ea 10"x10 ea  10" x10   SKTC  5"x5 ea       SLR x20 ea Kiko  x20 ea Kiko  x20 ea Kiko x20 ea Kiko x20 ea kiko   S/L SLR x20 ea Kiko x20 ea Kiko  x20 ea Kiko x20 ea Kiko x20 ea kiko   Clamshells  x20 ea Kiko  x20 ea Kiko  x20 ea Kiko x20 ea Kiko x20 ea kiko   Heel walk outs         Bridges  5" x 20 5"x20 5"x20 5"x20 5" x20                           Ther Activity                        Gait Training                        Modalities        CP

## 2023-01-26 ENCOUNTER — OFFICE VISIT (OUTPATIENT)
Dept: PHYSICAL THERAPY | Facility: HOME HEALTHCARE | Age: 53
End: 2023-01-26

## 2023-01-26 DIAGNOSIS — G89.29 CHRONIC BILATERAL LOW BACK PAIN WITHOUT SCIATICA: Primary | ICD-10-CM

## 2023-01-26 DIAGNOSIS — M54.50 CHRONIC BILATERAL LOW BACK PAIN WITHOUT SCIATICA: Primary | ICD-10-CM

## 2023-01-26 NOTE — PROGRESS NOTES
Daily Note     Today's date: 2023  Patient name: Rudy Al  : 1970  MRN: [id number]  Referring provider: Daksha Flores MD  Dx: No diagnosis found  Start Time:           Subjective: My L LB was a little spasmy this morning with some tight knots  It got better t/o the day  Objective: See treatment diary below    Assessment: Tolerated treatment well  Pt progressing well toward I HEP and able to complete with minimal S or vc's  Pt with STR noted during IASTM to R LB/SI jt region  Mild redness with no bruising noted  Patient would benefit from continued PT    Plan: Continue per plan of care        Re-eval Date: 23     Precautions: None       Manuals  1-3   IASTM  NV L LB 8' L LB 8'            Neuro Re-Ed    1-3   PPU  5" x5 5"x5 5" x10    Prone LE ext  1x5 kiko 2x5 Kiko 2x5 kiko    PPT        PPT with marches  3" x 15 ea 3"x15 ea 3"x15 ea 3"x15 ea 3" x15 ea   TA        TA + OH lifts  supine 2 2 # red ball 5" x 10 3# DB   5" x10 3# DB   5" x10 3# DB   5" x10 2# DB  5" x10   MTP/LTP Black x20 ea  Black 20x ea Black 20x ea Black 20x ea Black x20 ea   Palloff press  Black 1x10 R/L Black  1x10 R/L Black  1x10 R/L Black 2x10 R/L Black 1x10 R/L   SI joint dysfunction pathology - avoidance of aggravating movement patterns         Ther Ex   1-3   NuStep  L3 10 minutes  L3 10' L3 10 min L3 10' L 3  10'   Standing hip flex/abd/ext 2 5# 15x ea Kiko  2 5#    X 15 ea Kiko  2 5# 15x ea Kiko 2 5# 2x10  ea Kiko 2 5# 15   ea kiko   Squats  X 20 X 15 x20 x20 x20   Step-ups  D Fwd/Lat 1x15 ea C Fwd/lat  1x15 ea D Fwd/lat  1x15 ea D Fwd/lat 1x15 ea D Fwd/Lat  15 ea   LTR 10"x10 ea  10"x10 ea 10"x10 ea 10" x10 ea 10" x10   SKTC  5"x5 ea       SLR x20 ea Kiko  x20 ea Kiko  x20 ea Kiko x20 ea Kiko x20 ea kiko   S/L SLR x20 ea Kiko x20 ea Kiko  x20 ea Kiko x20 ea Kiko x20 ea kiko   Clamshells  x20 ea Kiko  x20 ea Kiko  x20 ea Kiko x20 ea Kiko x20 ea kiko   Heel walk outs         Bridges  5" x 20 5"x20 5"x20 5"x20 5" x20                           Ther Activity                        Gait Training                        Modalities        CP

## 2023-02-01 ENCOUNTER — OFFICE VISIT (OUTPATIENT)
Dept: PHYSICAL THERAPY | Facility: HOME HEALTHCARE | Age: 53
End: 2023-02-01

## 2023-02-01 DIAGNOSIS — M54.50 CHRONIC BILATERAL LOW BACK PAIN WITHOUT SCIATICA: Primary | ICD-10-CM

## 2023-02-01 DIAGNOSIS — G89.29 CHRONIC BILATERAL LOW BACK PAIN WITHOUT SCIATICA: Primary | ICD-10-CM

## 2023-02-01 NOTE — PROGRESS NOTES
Daily Note     Today's date: 2023  Patient name: Vanessa Padron  : 1970  MRN: [id number]  Referring provider: Gio Fernandes MD  Dx:   Encounter Diagnosis     ICD-10-CM    1  Chronic bilateral low back pain without sciatica  M54 50     G89 29                      Subjective: Pt notes that he does his stretching in the morning which helps a lot  Objective: See treatment diary below      Assessment: PT progressed program to focus on hip rotator strengthening for further SI stabilization  Pt with mild fatigue but no pain noted; continue with progression for added benefit  Plan: Continue per plan of care  Re-eval Date: 23     Precautions: None       Manuals    IASTM  NV L LB 8' L LB 8' L LE 8'            Neuro Re-Ed       PPU  5" x5 5"x5 5" x10    Prone LE ext  1x5 kiko 2x5 Kiko 2x5 kiko    PPT        PPT with march  3" x 15 ea 3"x15 ea 3"x15 ea 3"x15 ea    TA        TA + OH lifts  supine 2 2 # red ball 5" x 10 3# DB   5" x10 3# DB   5" x10 3# DB   5" x10    MTP/LTP Black x20 ea  Black 20x ea Black 20x ea Black 20x ea Black x 20 ea    Palloff press  Black 1x10 R/L Black  1x10 R/L Black  1x10 R/L Black 2x10 R/L Black x 20 R/L    SI joint dysfunction pathology - avoidance of aggravating movement patterns         Ther Ex      NuStep  L3 10 minutes  L3 10' L3 10 min L3 10' [facility]   L1 10 minutes   Standing hip flex/abd/ext 2 5# 15x ea Kiko  2 5#    X 15 ea Kiko  2 5# 15x ea Kiko 2 5# 2x10  ea Kiko    Squats  X 20 X 15 x20 x20    Step-ups  D Fwd/Lat 1x15 ea C Fwd/lat  1x15 ea D Fwd/lat  1x15 ea D Fwd/lat 1x15 ea Lateral only NV    LTR 10"x10 ea  10"x10 ea 10"x10 ea 10" x10 ea 10" x 10 ea    SKTC  5"x5 ea       SLR x20 ea Kiko  x20 ea Kiko  x20 ea Kiko x20 ea Kiko X 20 ea kiko    S/L SLR x20 ea Kiko x20 ea Kiko  x20 ea Kiko x20 ea Kiko X 20 ea kiko    Clamshells  x20 ea Kiko  x20 ea Kiko  x20 ea Kiko x20 ea Kiko X 20 ea kiko   tband NV?     Heel walk outs         Bridges  5" x 20 5"x20 5"x20 5"x20 With hip add  X 20    Quad LE ext      2 x 10 kiko    Fire hydrants      1 x 10 kiko    Leg Press      70# x 20    LF Hip abd/add     NV   Lateral step outs with tband      NV                   Ther Activity                        Gait Training                        Modalities        CP

## 2023-02-03 ENCOUNTER — OFFICE VISIT (OUTPATIENT)
Dept: PHYSICAL THERAPY | Facility: HOME HEALTHCARE | Age: 53
End: 2023-02-03

## 2023-02-03 DIAGNOSIS — M54.50 CHRONIC BILATERAL LOW BACK PAIN WITHOUT SCIATICA: Primary | ICD-10-CM

## 2023-02-03 DIAGNOSIS — G89.29 CHRONIC BILATERAL LOW BACK PAIN WITHOUT SCIATICA: Primary | ICD-10-CM

## 2023-02-03 NOTE — PROGRESS NOTES
Daily Note     Today's date: 2/3/2023  Patient name: Srinivasa Sosa  : 1970  MRN: [id number]  Referring provider: Obey Hunt MD  Dx: No diagnosis found  Start Time: 745          Subjective: I still have the spasmy feeling at my L LB  I did ok with the added ex last visit  Objective: See treatment diary below    Assessment: Tolerated treatment well  Pt requires constant vc's for lumbar stability and core control with all positions of TE  Pt denied increased LB discomfort t/o session and was able to liat added ex as per flow sheet w/o complaint  Pt remains with STR noted with IASTM to L LB, SI jt and piriformis regions  Some mild redness noted at end  Instructed pt in tennis ball roll at wall to reduce tightness  Patient would benefit from continued PT    Plan: Continue per plan of care        Re-eval Date: 23     Precautions: None       Manuals 2-3    IASTM L LB 8' NV L LB 8' L LB 8' L LE 8'            Neuro Re-Ed  2-3     PPU  5" x5 5"x5 5" x10    Prone LE ext  1x5 kiko 2x5 Kiko 2x5 kiko    PPT        PPT with marches   3"x15 ea 3"x15 ea 3"x15 ea    TA        TA + OH lifts  supine  3# DB   5" x10 3# DB   5" x10 3# DB   5" x10    MTP/LTP Black x20 ea  Black 20x ea Black 20x ea Black 20x ea Black x 20 ea    Palloff press  Black 1x20 R/L Black  1x10 R/L Black  1x10 R/L Black 2x10 R/L Black x 20 R/L    SI joint dysfunction pathology - avoidance of aggravating movement patterns         Ther Ex      NuStep  [facility] L1  10' L3 10' L3 10 min L3 10' [facility]   L1 10 minutes   Standing hip flex/abd/ext  2 5#    X 15 ea Kiko  2 5# 15x ea Kiko 2 5# 2x10  ea Kiko    Squats   X 15 x20 x20    Step-ups  D Lat only   1x15 ea C Fwd/lat  1x15 ea D Fwd/lat  1x15 ea D Fwd/lat 1x15 ea Lateral only NV    LTR 10"x10 ea  10"x10 ea 10"x10 ea 10" x10 ea 10" x 10 ea    SKTC  5"x5 ea       SLR x20 ea Kiko  x20 ea Kiko  x20 ea Kiko x20 ea Kiko X 20 ea kiko    S/L SLR x20 ea Kiko x20 ea Kiko  x20 ea Kiko x20 ea Carmen X 20 ea carmen    Clamshells  Red x20 ea Carmen  x20 ea Carmen  x20 ea Carmen x20 ea Carmen X 20 ea carmen   tband NV?     Heel walk outs         [facility]  W/hip add  5" x 20 5"x20 5"x20 5"x20 With hip add  X 20    Quad LE ext  2x10 carmen    2 x 10 carmen    Fire hydrants  1x10 carmen    1 x 10 carmen    Supine t-ball for lower abs 1x20       Leg Press  90# x20    70# x 20    LF Hip abd/add 35# x20 ea    NV   Lateral step outs with tband  NV    NV                   Ther Activity                        Gait Training                        Modalities        CP

## 2023-02-06 ENCOUNTER — OFFICE VISIT (OUTPATIENT)
Dept: PHYSICAL THERAPY | Facility: HOME HEALTHCARE | Age: 53
End: 2023-02-06

## 2023-02-06 DIAGNOSIS — M54.50 CHRONIC BILATERAL LOW BACK PAIN WITHOUT SCIATICA: Primary | ICD-10-CM

## 2023-02-06 DIAGNOSIS — G89.29 CHRONIC BILATERAL LOW BACK PAIN WITHOUT SCIATICA: Primary | ICD-10-CM

## 2023-02-06 NOTE — PROGRESS NOTES
Daily Note     Today's date: 2023  Patient name: Vaishnavi Tinoco  : 1970  MRN: [id number]  Referring provider: Stephanie Norman MD  Dx: No diagnosis found  Start Time:           Subjective: I have some discomfort at the side of both hips today and I'm not sure why  Maybe the damp weather  Objective: See treatment diary below    Assessment: Tolerated treatment well  Pt was able to complete all TE without c/o B hip or LBP  Pt did report reduction in B hip pain and tightness t/o session  Pt declined IASTM today to determine benefits  Patient would benefit from continued PT    Plan: Continue per plan of care  Re-eval Date: 23     Precautions: None       Manuals 2-3 2-6    IASTM L LB 8' Declined L LB 8' L LB 8' L LE 8'            Neuro Re-Ed  2-3 2-6      PPU   5"x5 5" x10    Prone LE ext   2x5 Kiko 2x5 kiko    PPT        PPT with marches    3"x15 ea 3"x15 ea    TA        TA + OH lifts  supine   3# DB   5" x10 3# DB   5" x10    MTP/LTP Black x20 ea  Black 20x ea Black 20x ea Black 20x ea Black x 20 ea    Palloff press  Black 1x20 R/L Black  1x10 R/L Black  1x10 R/L Black 2x10 R/L Black x 20 R/L    SI joint dysfunction pathology - avoidance of aggravating movement patterns         Ther Ex  2-    NuStep  [address] [facility] L1  10' L3 10' L3 10 min L3 10' [address] [facility]   L1 10 minutes   Standing hip flex/abd/ext 2 5#  2x10 ea kiko 2 5#    X 15 ea Kiko  2 5# 15x ea Kiko 2 5# 2x10  ea Kiko    Squats    x20 x20    Step-ups  D Lat only   1x15 ea D Fwd/lat  1x20ea D Fwd/lat  1x15 ea D Fwd/lat 1x15 ea Lateral only NV    LTR 10"x10 ea  10"x10 ea 10"x10 ea 10" x10 ea 10" x 10 ea    SKTC  5"x5 ea       SLR x20 ea Kiko  x20 ea Kiko  x20 ea Kiko x20 ea Kiko X 20 ea kiko    S/L SLR x20 ea Kiko x20 ea Kiko  x20 ea Kiko x20 ea Kiko X 20 ea kiko    Clamshells  Red x20 ea Kiko  Red x20 ea Kiko  x20 ea Kiko x20 ea Kiko X 20 ea kiko   tband NV?     Heel walk outs         Bridges  W/hip add  5" x 20 5"x20 5"x20 5"x20 With hip add  X 20    Quad LE ext  2x10 kiko 2x10 kiko   2 x 10 kiko    Fire hydrants  1x10 kiko 1x10 kiko   1 x 10 kiko    Supine t-ball for lower abs 1x20 1x20      Leg Press  90# x20 90# x20   70# x 20    LF Hip abd/add 35# x20 ea 35# x20 ea   NV   Lateral step outs with tband  NV NV   NV                   Ther Activity                        Gait Training                        Modalities        CP

## 2023-02-09 ENCOUNTER — OFFICE VISIT (OUTPATIENT)
Dept: PHYSICAL THERAPY | Facility: HOME HEALTHCARE | Age: 53
End: 2023-02-09

## 2023-02-09 DIAGNOSIS — G89.29 CHRONIC BILATERAL LOW BACK PAIN WITHOUT SCIATICA: Primary | ICD-10-CM

## 2023-02-09 DIAGNOSIS — M54.50 CHRONIC BILATERAL LOW BACK PAIN WITHOUT SCIATICA: Primary | ICD-10-CM

## 2023-02-09 NOTE — PROGRESS NOTES
Daily Note     Today's date: 2023  Patient name: Dot Kevin  : 1970  MRN: [id number]  Referring provider: Dominique Iqbal MD  Dx: No diagnosis found  Start Time:           Subjective: My LB is pinchy  Pain of 2/10  I noticed a difference without the IASTM and I think I will need it today  Objective: See treatment diary below    Assessment: Tolerated treatment well  Pt progressing well toward I HEP with minimal vc's needed to perform ex correctly  Pt with average pain of 2/10 and denied increased pain level at L LB  Resumed IASTM to L LB region with STR noted and no sign of bruising  Patient would benefit from continued PT    Plan: Continue per plan of care  Re-eval Date: 23     Precautions: None       Manuals 2-3 2-6 2-9    IASTM L LB 8' Declined L LB 8' L LB 8' L LE 8'            Neuro Re-Ed  2-3 2-6 2-9     PPU    5" x10    Prone LE ext    2x5 kiko    PPT        PPT with marches     3"x15 ea    TA        TA + OH lifts  supine    3# DB   5" x10    MTP/LTP Black x20 ea  Black 20x ea Black 20x ea Black 20x ea Black x 20 ea    Palloff press  Black 1x20 R/L Black  1x10 R/L Black  1x20 R/L Black 2x10 R/L Black x 20 R/L    SI joint dysfunction pathology - avoidance of aggravating movement patterns         Ther Ex  2-6 2-    [facility] SRC L1  10' L3 10' L3 10' L3 10' [facility]   L1 10 minutes   Standing hip flex/abd/ext 2 5#  2x10 ea kiko 2 5#    X 15 ea Kiko  2 5# 15x ea Kiko 2 5# 2x10  ea Kiko    Squats     x20    Step-ups  D Lat only   1x15 ea D Fwd/lat  1x20ea D Fwd/lat  1x20 ea D Fwd/lat 1x15 ea Lateral only NV    LTR 10"x10 ea  10"x10 ea 10"x10 ea 10" x10 ea 10" x 10 ea    SKTC         SLR x20 ea Kiko  x20 ea Kiko  x20 ea Kiko x20 ea Kiko X 20 ea kiko    S/L SLR x20 ea Kiko x20 ea Kiko  x20 ea Kiko x20 ea Ikko X 20 ea kiko    Clamshells  Red x20 ea Kiko  Red x20 ea Kiko  x20 ea Kiko x20 ea Kiko X 20 ea kiko   tband NV?     Heel walk outs         [employer]  W/hip add  5" x 20 5"x20  w/ball 5"x20  w/ball 5"x20 With hip add  X 20    Quad LE ext  2x10 kiko 2x10 kiko 2x10 kiko  2 x 10 kiko    Fire hydrants  1x10 kiko 1x10 kiko 1x10  1 x 10 kiko    Supine t-ball for lower abs 1x20 1x20 1x20     Leg Press  90# x20 90# x20 90# x20  70# x 20    LF Hip abd/add 35# x20 ea 35# x20 ea 35# x20 ea  NV   Lateral step outs with tband  NV NV NV  NV                   Ther Activity                        Gait Training                        Modalities        CP

## 2023-02-13 ENCOUNTER — OFFICE VISIT (OUTPATIENT)
Dept: PHYSICAL THERAPY | Facility: HOME HEALTHCARE | Age: 53
End: 2023-02-13

## 2023-02-13 DIAGNOSIS — G89.29 CHRONIC BILATERAL LOW BACK PAIN WITHOUT SCIATICA: Primary | ICD-10-CM

## 2023-02-13 DIAGNOSIS — M54.50 CHRONIC BILATERAL LOW BACK PAIN WITHOUT SCIATICA: Primary | ICD-10-CM

## 2023-02-13 NOTE — PROGRESS NOTES
Daily Note     Today's date: 2023  Patient name: Garrett Tomlinson  : 1970  MRN: [id number]  Referring provider: Hal Atkinson MD  Dx:   Encounter Diagnosis     ICD-10-CM    1  Chronic bilateral low back pain without sciatica  M54 50     G89 29                  Subjective:  Pt reports no pain this morning  Objective: See treatment diary below      Assessment: Tolerated treatment well  Pt notes a little fatigue with standing hip abduction  No pain reported t/o session  Minimal verbal cues needed t/o session  No redness or bruising noted upon completion of IASTM  Patient would benefit from continued PT      Plan: Continue per plan of care  Re-eval Date: 23     Precautions: None       Manuals 2-3 2-6 2-9    IASTM L LB 8' Declined L LB 8' L LB 10' L LE 8'            Neuro Re-Ed  2-3 2-6 2-9     PPU        Prone LE ext        PPT        PPT with marches         TA        TA + OH lifts  supine        MTP/LTP Black x20 ea  Black 20x ea Black 20x ea Black 20x ea Black x 20 ea    Palloff press  Black 1x20 R/L Black  1x10 R/L Black  1x20 R/L Black 2x10 R/L Black x 20 R/L    SI joint dysfunction pathology - avoidance of aggravating movement patterns         Ther Ex  2-6 2-9     [facility] SRC L1  10' L3 10' L3 10' L3 10' [facility]   L1 10 minutes   Standing hip flex/abd/ext 2 5#  2x10 ea kiko 2 5#    X 15 ea Kiko  2 5# 15x ea Kiko 2 5# x 15   ea Kiko    Squats         Step-ups  D Lat only   1x15 ea D Fwd/lat  1x20ea D Fwd/lat  1x20 ea D Fwd/lat 1x 20 ea Lateral only NV    LTR 10"x10 ea  10"x10 ea 10"x10 ea 10" x10 ea 10" x 10 ea    SKTC         SLR x20 ea Kiko  x20 ea Kiko  x20 ea Kiko x20 ea Kiko X 20 ea kiko    S/L SLR x20 ea Kiko x20 ea Kiko  x20 ea Kiko x20 ea Kiko X 20 ea kiko    Clamshells  Red x20 ea Kiko  Red x20 ea Kiko  x20 ea Kiko Red x20 ea Kiko X 20 ea kiko   tband NV?     Heel walk outs         [facility]  W/hip add  5" x 20 5"x20  w/ball 5"x20  w/ball 5"x20 w/ball With hip add  X 20    Quad LE ext  2x10 kiko 2x10 jonathan 2x10 jonathan 2x10 Jonathan 2 x 10 jonathan    Fire hydrants  1x10 jonathan 1x10 jonathan 1x10 1x10 1 x 10 jonathan    Supine t-ball for lower abs 1x20 1x20 1x20 1x20    Leg Press  90# x20 90# x20 90# x20 90# x 20  70# x 20    LF Hip abd/add 35# x20 ea 35# x20 ea 35# x20 ea 35# x 20 ea  NV   Lateral step outs with tband  NV NV NV  NV                   Ther Activity                        Gait Training                        Modalities        CP

## 2023-02-17 ENCOUNTER — OFFICE VISIT (OUTPATIENT)
Dept: PHYSICAL THERAPY | Facility: HOME HEALTHCARE | Age: 53
End: 2023-02-17

## 2023-02-17 DIAGNOSIS — G89.29 CHRONIC BILATERAL LOW BACK PAIN WITHOUT SCIATICA: Primary | ICD-10-CM

## 2023-02-17 DIAGNOSIS — M54.50 CHRONIC BILATERAL LOW BACK PAIN WITHOUT SCIATICA: Primary | ICD-10-CM

## 2023-02-17 NOTE — PROGRESS NOTES
Daily Note     Today's date: 2023  Patient name: Tiffanie Hernandez  : 1970  MRN: [id number]  Referring provider: Morris Galvan MD  Dx:   Encounter Diagnosis     ICD-10-CM    1  Chronic bilateral low back pain without sciatica  M54 50     G89 29                      Subjective: Pt reports 2/10 pain L side LB  Objective: See treatment diary below      Assessment: Tolerated treatment well  No increased pain reported LB t/o session  Minimal verbal cues needed t/o session  Mild redness noted L LB after IASTM  Patient would benefit from continued PT      Plan: Continue per plan of care        Re-eval Date: 23     Precautions: None       Manuals 2-3 2-6 2-9    IASTM L LB 8' Declined L LB 8' L LB 10' L LB 10'           Neuro Re-Ed  2-3 2-6 2-9     PPU        Prone LE ext        PPT        PPT with marches         TA        TA + OH lifts  supine        MTP/LTP Black x20 ea  Black 20x ea Black 20x ea Black 20x ea Black x 20 ea    Palloff press  Black 1x20 R/L Black  1x10 R/L Black  1x20 R/L Black 2x10 R/L Black x 20 R/L    SI joint dysfunction pathology - avoidance of aggravating movement patterns         Ther Ex  2-6 2-9     [address] [place] [address] SRC L1  10' L3 10' L3 10' L3 10' [facility]   L3  10 minutes   Standing hip flex/abd/ext 2 5#  2x10 ea kiko 2 5#    X 15 ea Kiko  2 5# 15x ea Kiko 2 5# x 15   ea Kiko 2 5# x 15 ea Kiko    Squats         Step-ups  D Lat only   1x15 ea D Fwd/lat  1x20ea D Fwd/lat  1x20 ea D Fwd/lat 1x 20 ea D Fwd/lat   1x20 ea    LTR 10"x10 ea  10"x10 ea 10"x10 ea 10" x10 ea 10" x 10 ea    SKTC         SLR x20 ea Kiko  x20 ea Kiko  x20 ea Kiko x20 ea Kiko X 20 ea kiko    S/L SLR x20 ea Kiko x20 ea Kiko  x20 ea Kiko x20 ea Kiko X 20 ea kiko    Clamshells  Red x20 ea Kiko  Red x20 ea Kiko  x20 ea Kiko Red x20 ea Kiko Red X 20 ea kiko      Heel walk outs         Bridges  W/hip add  5" x 20 5"x20  w/ball 5"x20  w/ball 5"x20 w/ball With hip add  X 20    Quad LE ext  2x10 kiko 2x10 kiko 2x10 kiko 2x10 Kiko 2 x 10 kiko Fire hydrants  1x10 kiko 1x10 kiko 1x10 1x10 1 x 10 kiko    Supine t-ball for lower abs 1x20 1x20 1x20 1x20 1x20   Leg Press  90# x20 90# x20 90# x20 90# x 20  90# x 20    LF Hip abd/add 35# x20 ea 35# x20 ea 35# x20 ea 35# x 20 ea  35# x 20 ea    Lateral step outs with tband  NV NV NV                     Ther Activity                        Gait Training                        Modalities        CP

## 2023-02-20 ENCOUNTER — OFFICE VISIT (OUTPATIENT)
Dept: PHYSICAL THERAPY | Facility: HOME HEALTHCARE | Age: 53
End: 2023-02-20

## 2023-02-20 DIAGNOSIS — G89.29 CHRONIC BILATERAL LOW BACK PAIN WITHOUT SCIATICA: Primary | ICD-10-CM

## 2023-02-20 DIAGNOSIS — M54.50 CHRONIC BILATERAL LOW BACK PAIN WITHOUT SCIATICA: Primary | ICD-10-CM

## 2023-02-20 NOTE — PROGRESS NOTES
Daily Note     Today's date: 2023  Patient name: Josefina Fraire  : 1970  MRN: [id number]  Referring provider: Francisca Regan MD  Dx: No diagnosis found  Start Time: 0700          Subjective: I have some stiffness of 1/10 this morning  I have much less discomfort compared to [facility] and next visit will be my last      Objective: See treatment diary below    Assessment: Tolerated treatment well  Pt progressing well toward I HEP with vc's as needed for correct form  Pt remains with STR noted during IASTM at L LB and piriformis region  Pt reports consistency with HEP as daily ability allows  Patient would benefit from continued PT    Plan: Continue per plan of care        Re-eval Date: 23     Precautions: None       Manuals 2-20 2-6 2-9    IASTM L LB 10' Declined L LB 8' L LB 10' L LB 10'           Neuro Re-Ed  2-20 2-6 2-9     PPU        Prone LE ext        PPT        PPT with marches         TA        TA + OH lifts  supine        MTP/LTP Black x20 ea  Black 20x ea Black 20x ea Black 20x ea Black x 20 ea    Palloff press  Black 1x20 R/L Black  1x10 R/L Black  1x20 R/L Black 2x10 R/L Black x 20 R/L    SI joint dysfunction pathology - avoidance of aggravating movement patterns         Ther Ex 2-20 2-6 2-9     [facility] SRC L3  10' L3 10' L3 10' L3 10' [facility]   L3  10 minutes   Standing hip flex/abd/ext 2 5# x15    ea kiko 2 5#    X 15 ea Kiko  2 5# 15x ea Kiko 2 5# x 15   ea Kiko 2 5# x 15 ea Kiko    Squats         Step-ups  D Lat only   1x20  ea D Fwd/lat  1x20ea D Fwd/lat  1x20 ea D Fwd/lat 1x 20 ea D Fwd/lat   1x20 ea    LTR 10"x10 ea  10"x10 ea 10"x10 ea 10" x10 ea 10" x 10 ea    SKTC         SLR x20 ea Kiko  x20 ea Kiko  x20 ea Kiko x20 ea Kiko X 20 ea kiko    S/L SLR x20 ea Kiko x20 ea Kiko  x20 ea Kiko x20 ea Kiko X 20 ea kiko    Clamshells  Red x20 ea Kiko  Red x20 ea Kiko  x20 ea Kiko Red x20 ea Kiko Red X 20 ea kiko      Heel walk outs         Bridges  W/hip add  5" x 20 5"x20  w/ball 5"x20  w/ball 5"x20 w/ball With hip add  X 20    Quad LE ext  2x10 jonathan 2x10 jonathan 2x10 jonathan 2x10 Jonathan 2 x 10 jonathan    Fire hydrants  1x10 jonathan 1x10 jonathan 1x10 1x10 1 x 10 jonathan    Supine t-ball for lower abs 1x20 1x20 1x20 1x20 1x20   Leg Press  90# x20 90# x20 90# x20 90# x 20  90# x 20    LF Hip abd/add 35# x20 ea 35# x20 ea 35# x20 ea 35# x 20 ea  35# x 20 ea    Lateral step outs with tband   NV NV                     Ther Activity                        Gait Training                        Modalities        CP

## 2023-02-23 ENCOUNTER — APPOINTMENT (OUTPATIENT)
Dept: PHYSICAL THERAPY | Facility: HOME HEALTHCARE | Age: 53
End: 2023-02-23

## 2023-02-27 ENCOUNTER — OFFICE VISIT (OUTPATIENT)
Dept: PHYSICAL THERAPY | Facility: HOME HEALTHCARE | Age: 53
End: 2023-02-27

## 2023-02-27 DIAGNOSIS — G89.29 CHRONIC BILATERAL LOW BACK PAIN WITHOUT SCIATICA: Primary | ICD-10-CM

## 2023-02-27 DIAGNOSIS — M54.50 CHRONIC BILATERAL LOW BACK PAIN WITHOUT SCIATICA: Primary | ICD-10-CM

## 2023-02-27 NOTE — PROGRESS NOTES
PT Discharge    Today's date: 2023  Patient name: Vaishnavi Tinoco  : 1970  MRN: [id number]  Referring provider: Stephanie Norman MD  Dx:   Encounter Diagnosis     ICD-10-CM    1  Chronic bilateral low back pain without sciatica  M54 50     G89 29           Start Time: 0700  Stop Time: 0745  Total time in clinic (min): 45 minutes    Assessment  Assessment details: Pt has made improvement in pain, strength, ROM, and overall mobility since beginning PT, and he has either met or is progressing towards all of his goals  The pt has demonstrated independence with his HEP and he is confident in his ability to adhere to his HEP an further progress on his own  Pt will be D/C to HEP at this time  Understanding of Dx/Px/POC: good   Prognosis: good    Goals  STGs to be achieved in 4 weeks:  -Pt to demonstrate reduced subjective pain rating "at worst" by at least 2-3 points from Initial Eval to allow for reduced pain with ADLs and improved functional activity tolerance  - MET  -Pt to demonstrate L/S ROM improved WFL in order to maximize joint mobility and function and allow for progression of exercise program and achievement of goals  - Met  -Pt to demonstrate increased MMT of BLE by at least 1/2 grade in order to improve safety and stability with ADLs and functional mobility  - Met    LTGs to be achieved upon discharge:   -Pt will be I with HEP in order to continue to improve quality of life and independence and reduce risk for re-injury  Met  -Pt to demonstrate return to activities of daily living without limiations or restrictions  Met  -Pt will return to ambulation > 1 hour without pain to help facilitate return to community activities independently  Met  -Pt to demonstrate return to work activities without limitations or restrictions  Met  -Pt to demonstrate improved function as noted by achieving or exceeding predicted score on FOTO outcomes assessment tool   Met          Plan  Plan details: D/C to HEP  Treatment plan discussed with: patient        Subjective Evaluation    History of Present Illness  Mechanism of injury: Pt reporting that he has been having lower back pain for the past 4-5 years  He has had series of injections with temporary, 4-6 week, relief only  He is seeing the chiropractor but notes he usual only gets 1 day of relief and then feels stiff again the next morning  He is seeing Orthopedic who doesn't want to keep doing injections and is referring to OPPT for conservative management of s/s  UPDATE: pt notes that he is feeling better but not 100% yet, feels he could benefit from more therapy  UPDATE 2023:  Pt reports that his back has been feeling better than when he first started, however, the past week has been having a little more soreness on the L side of his low back  He reports that he notices it the most when standing or turning, otherwise, he feels like he was getting better  He reports that overall the pain has decreased though and that he does not get pain when sleeping anymore  Pt reports that the standing hip extension seems to flair up that spot on his back  UPDATE 2023:  Pt reports that his back has been feeling pretty good  He reports that he gets some stiffness in the morning, but that his back loosens up when he performs his exercises  Pt reports good compliance with his HEP and that he feels he is ready for D/C    Quality of life: good    Pain  At best pain ratin  At worst pain ratin  Quality: dull ache and sharp  Relieving factors: medications, heat and ice (OTC prn )  Progression: no change    Social Support  Stairs in house: yes   Lives in: multiple-level home  Lives with: spouse    Employment status: working    Diagnostic Tests  MRI studies: abnormal  Treatments  Previous treatment: injection treatment  Current treatment: physical therapy  Patient Goals  Patient goals for therapy: decreased edema, decreased pain, improved balance, increased motion, increased strength and independence with ADLs/IADLs          Objective     Concurrent Complaints  Positive for disturbed sleep  Negative for bladder dysfunction, bowel dysfunction and saddle (S4) numbness    Postural Observations  Seated posture: fair  Standing posture: good        Palpation   Left   Hypertonic in the lumbar paraspinals  Tenderness of the erector spinae, lumbar paraspinals and piriformis  Right   Tenderness of the piriformis  Tenderness     Lumbar Spine  Tenderness in the facet joint  Left Hip   Tenderness in the PSIS  Right Hip   Tenderness in the PSIS  Neurological Testing     Sensation     Lumbar   Left   Intact: light touch    Right   Intact: light touch    Active Range of Motion     Lumbar   Flexion:  WFL  Extension:  WFL  Left lateral flexion:  WFL  Right lateral flexion:  WFL  Left rotation:  WFL  Right rotation:  WFL  Left Hip   Flexion: WFL and with pain  Abduction: WFL    Right Hip   Flexion: WFL and with pain  Abduction: WFL  Left Knee   Flexion: WFL  Extension: WFL    Right Knee   Flexion: WFL  Extension: WFL    Strength/Myotome Testing     Left Hip   Planes of Motion   Flexion: 4+  Abduction: 4+  Adduction: 5    Right Hip   Planes of Motion   Flexion: 4+  Abduction: 4+  Adduction: 5    Left Knee   Flexion: 4+  Extension: 4+    Right Knee   Flexion: 4+  Extension: 4+    Tests     Left Hip   Positive piriformis and SI compression  Right Hip   Positive piriformis and SI compression       Ambulation     Ambulation: Level Surfaces     Additional Level Surfaces Ambulation Details  Tolerance > 2-3 hours but with increase in pain; use of SI belt for added stability and pain relief     Ambulation: Stairs   Pattern: reciprocal  Pattern: reciprocal             Re-eval Date: 12/10/22     Precautions: None       Manuals 2-20 2/27 2-9 2/13 2/17   IASTM L LB 10' L LB 10' L LB 8' L LB 10' L LB 10'           Neuro Re-Ed  2-20  2-9     PPU        Prone LE ext PPT        PPT with annamarie         TA        TA + OH lifts  supine        MTP/LTP Black x20 ea  Black 20x ea Black 20x ea Black 20x ea Black x 20 ea    Palloff press  Black 1x20 R/L Black  1x20 R/L Black  1x20 R/L Black 2x10 R/L Black x 20 R/L    SI joint dysfunction pathology - avoidance of aggravating movement patterns         Ther Ex 2-20  2-9     [address] [facility] SRC L3  10' L3 10' L3 10' L3 10' [facility]   L3  10 minutes   Standing hip flex/abd/ext 2 5# x15    ea carmen 2 5#    X 15 ea Carmen  2 5# 15x ea Carmen 2 5# x 15   ea Carmen 2 5# x 15 ea Carmen    Squats         Step-ups  D Lat only   1x20  ea D Fwd/lat  1x20ea D Fwd/lat  1x20 ea D Fwd/lat 1x 20 ea D Fwd/lat   1x20 ea    LTR 10"x10 ea  10"x10 ea 10"x10 ea 10" x10 ea 10" x 10 ea    SKTC         SLR x20 ea Carmen  x20 ea Carmen  x20 ea Carmen x20 ea Carmen X 20 ea carmen    S/L SLR x20 ea Carmen x20 ea Carmen  x20 ea Carmen x20 ea Carmen X 20 ea carmen    Clamshells  Red x20 ea Carmen  Red x20 ea Carmen  x20 ea Carmen Red x20 ea Carmen Red X 20 ea carmen      Heel walk outs         Bridges  W/hip add  5" x 20 5"x20  w/ball 5"x20  w/ball 5"x20 w/ball With hip add  X 20    Quad LE ext  2x10 carmen 2x10 carmen 2x10 carmen 2x10 Carmen 2 x 10 carmen    Fire hydrants  1x10 carmen 1x10 carmen 1x10 1x10 1 x 10 carmen    Supine t-ball for lower abs 1x20 1x20 1x20 1x20 1x20   Leg Press  90# x20 90# x20 90# x20 90# x 20  90# x 20    LF Hip abd/add 35# x20 ea 35# x20 ea 35# x20 ea 35# x 20 ea  35# x 20 ea    Lateral step outs with tband    NV                     Ther Activity                        Gait Training                        Modalities        CP

## 2023-04-24 ENCOUNTER — HOSPITAL ENCOUNTER (EMERGENCY)
Facility: HOSPITAL | Age: 53
Discharge: HOME | End: 2023-04-24
Attending: EMERGENCY MEDICINE | Admitting: EMERGENCY MEDICINE

## 2023-04-24 VITALS
TEMPERATURE: 97.6 F | RESPIRATION RATE: 18 BRPM | WEIGHT: 249.12 LBS | DIASTOLIC BLOOD PRESSURE: 79 MMHG | OXYGEN SATURATION: 98 % | BODY MASS INDEX: 35.74 KG/M2 | SYSTOLIC BLOOD PRESSURE: 149 MMHG | HEART RATE: 81 BPM

## 2023-04-24 DIAGNOSIS — L25.9 CONTACT DERMATITIS: Primary | ICD-10-CM

## 2023-04-24 PROBLEM — G56.20 CUBITAL TUNNEL SYNDROME: Status: ACTIVE | Noted: 2019-04-12

## 2023-04-24 PROBLEM — G56.30 RADIAL TUNNEL SYNDROME: Status: ACTIVE | Noted: 2019-11-18

## 2023-04-24 PROBLEM — S33.5XXA LUMBAR SPRAIN: Status: ACTIVE | Noted: 2019-05-06

## 2023-04-24 PROBLEM — G56.03 BILATERAL CARPAL TUNNEL SYNDROME: Status: ACTIVE | Noted: 2021-07-19

## 2023-04-24 PROBLEM — Z98.890 STATUS POST ENDOSCOPIC CARPAL TUNNEL RELEASE: Status: ACTIVE | Noted: 2021-12-21

## 2023-04-24 PROBLEM — M47.816 LUMBAR SPONDYLOSIS: Status: ACTIVE | Noted: 2020-02-10

## 2023-04-24 PROBLEM — M51.26 DISPLACEMENT OF LUMBAR INTERVERTEBRAL DISC WITHOUT MYELOPATHY: Status: ACTIVE | Noted: 2019-08-20

## 2023-04-24 PROBLEM — S80.01XA CONTUSION OF RIGHT KNEE: Status: ACTIVE | Noted: 2020-07-07

## 2023-04-24 PROBLEM — M25.832 ULNAR ABUTMENT SYNDROME OF LEFT WRIST: Status: ACTIVE | Noted: 2022-06-15

## 2023-04-24 PROBLEM — S63.659A SPRAIN OF METACARPOPHALANGEAL JOINT: Status: ACTIVE | Noted: 2023-04-24

## 2023-04-24 PROBLEM — S46.009A INJURY OF TENDON OF ROTATOR CUFF: Status: ACTIVE | Noted: 2020-11-05

## 2023-04-24 PROBLEM — R94.130 ABNORMAL NERVE CONDUCTION STUDIES: Status: ACTIVE | Noted: 2019-01-04

## 2023-04-24 PROBLEM — M75.50 BURSITIS OF SHOULDER: Status: ACTIVE | Noted: 2020-10-06

## 2023-04-24 PROBLEM — M77.11 LATERAL EPICONDYLITIS OF RIGHT ELBOW: Status: ACTIVE | Noted: 2019-08-07

## 2023-04-24 PROBLEM — G56.20 ULNAR NERVE ABNORMALITY: Status: ACTIVE | Noted: 2017-06-01

## 2023-04-24 PROBLEM — Q74.0 CONGENITAL POSITIVE ULNAR VARIANCE OF LEFT WRIST: Status: ACTIVE | Noted: 2022-06-15

## 2023-04-24 PROBLEM — M23.309 DERANGEMENT OF MENISCUS: Status: ACTIVE | Noted: 2023-04-24

## 2023-04-24 PROBLEM — M54.17 LUMBOSACRAL RADICULITIS: Status: ACTIVE | Noted: 2019-08-20

## 2023-04-24 PROBLEM — M51.369 DEGENERATION OF LUMBAR INTERVERTEBRAL DISC: Status: ACTIVE | Noted: 2019-05-06

## 2023-04-24 PROBLEM — M53.3 SACROILIAC JOINT PAIN: Status: ACTIVE | Noted: 2019-05-06

## 2023-04-24 PROBLEM — M51.36 DEGENERATION OF LUMBAR INTERVERTEBRAL DISC: Status: ACTIVE | Noted: 2019-05-06

## 2023-04-24 RX ORDER — CLOTRIMAZOLE AND BETAMETHASONE DIPROPIONATE 10; .64 MG/G; MG/G
CREAM TOPICAL
Qty: 45 G | Refills: 0 | Status: SHIPPED | OUTPATIENT
Start: 2023-04-24

## 2023-04-24 RX ORDER — PREDNISONE 20 MG/1
60 TABLET ORAL DAILY
Qty: 15 TABLET | Refills: 0 | Status: SHIPPED | OUTPATIENT
Start: 2023-04-24 | End: 2023-04-29

## 2023-04-24 RX ORDER — PREDNISONE 20 MG/1
60 TABLET ORAL ONCE
Status: COMPLETED | OUTPATIENT
Start: 2023-04-24 | End: 2023-04-24

## 2023-04-24 RX ADMIN — PREDNISONE 60 MG: 20 TABLET ORAL at 08:11

## 2023-04-24 NOTE — ED PROVIDER NOTES
"History  Chief Complaint   Patient presents with   • Wound Check     Pt seen at urgent care for rash on valdes feet and diagnosed with \"bacterial infection\" placed on keflex  Rash worsening  Patient is a 49 yo male with PMH of allergic rhinitis and seasonal allergies presenting with worsening bilateral lower extremity rash  He was recently seen at urgent care on  and placed on 10 days of Keflex for cellulitis  He had athlete's foot for ~2 weeks and developed a bilateral foot rash 3 days ago  He has been adhering to urgent care instructions to take Keflex, continue athlete's foot cream, and keep feet dry and clean  He occasionally soaks feet in epsom salt  He janette a line on both ankles where the redness began and presents today since the rash spread beyond the lines  He reports severe pruritis with no relief from Benadryl, painful non-draining wound on bottom of right foot that opened yesterday, bleeding at ankles from frequent scratching, and a few clear-draining blisters  Reports recently having fever, chills, and body aches that subsided 2 days ago  No further complaints  He states he was doing yard work 1 week prior to developing rash  No prior hx of cellulitis  No hx of diabetes  Most recent HbA1C was 5 8  He states he has an appointment scheduled with Podiatry in 2 days  Prior to Admission Medications   Prescriptions Last Dose Informant Patient Reported? Taking?    Ascorbic Acid (VITAMIN C) 1000 MG tablet   Yes No   Sig: Take 1,000 mg by mouth daily   EPINEPHrine (EPIPEN) 0 3 mg/0 3 mL SOAJ   No No   Sig: Inject 0 3 mL (0 3 mg total) into a muscle once for 1 dose As needed for allergy reaction   Levothyroxine Sodium 88 MCG CAPS   Yes No   Si mcg daily in the early morning    Multiple Vitamin (MULTIVITAMIN) tablet   Yes No   Sig: Take 1 tablet by mouth daily   Tuberculin-Allergy Syringes (ALLERGY SYRINGE 1CC/27GX1/2\") 27G X 1/2\" 1 ML MISC   No No   Sig: by Does not apply route once a week 2 " "allergy injections once per month   Patient not taking: Reported on 2021   Tuberculin-Allergy Syringes (B-D ALLERGY SYRINGE 1CC/28G) 28G X 1/2\" 1 ML MISC   No No   Si INJECTIONS EVERY 4-5 WEEKS   cephalexin (KEFLEX) 500 mg capsule   No No   Sig: Take 1 capsule (500 mg total) by mouth every 6 (six) hours for 10 days   cholecalciferol (VITAMIN D3) 400 units tablet   Yes No   Sig: Take 400 Units by mouth daily   citalopram (CeleXA) 20 mg tablet   Yes No   Si mg daily     ferrous sulfate 325 (65 Fe) mg tablet   Yes No   Sig: Take 325 mg by mouth daily with breakfast   latanoprost (XALATAN) 0 005 % ophthalmic solution   Yes No   Sig: place 1 drop into both eyes daily   montelukast (SINGULAIR) 10 mg tablet   No No   Sig: Take 1 tablet (10 mg total) by mouth daily at bedtime   naproxen (NAPROSYN) 375 mg tablet   No No   Sig: Take 1 tablet (375 mg total) by mouth 2 (two) times a day as needed for moderate pain   Patient not taking: Reported on 2020   predniSONE 20 mg tablet   No No   Sig: 3 tabs by mouth daily x 3 days, 2 tabs by mouth daily x 3 days, then 1 tab by mouth daily x 3 days   Patient not taking: Reported on 2023   valsartan-hydrochlorothiazide (DIOVAN-HCT) 80-12 5 MG per tablet   Yes No   Sig: Take 1 tablet by mouth daily      Facility-Administered Medications: None       Past Medical History:   Diagnosis Date   • Allergic rhinitis    • Anxiety    • Disease of thyroid gland    • Hypertension        Past Surgical History:   Procedure Laterality Date   • ADENOIDECTOMY     • CHOLECYSTECTOMY     • FOOT SURGERY Left    • KNEE ARTHROPLASTY Right    • NASAL SEPTUM SURGERY     • ROTATOR CUFF REPAIR Bilateral    • TONSILLECTOMY  childhood       Family History   Problem Relation Age of Onset   • No Known Problems Mother    • Hypertension Father    • Hypertension Paternal Grandmother    • Hypertension Paternal Grandfather      I have reviewed and agree with the history as " documented  E-Cigarette/Vaping   • E-Cigarette Use Never User      E-Cigarette/Vaping Substances   • Nicotine No    • THC No    • CBD No    • Flavoring No    • Other No    • Unknown No      Social History     Tobacco Use   • Smoking status: Never   • Smokeless tobacco: Current     Types: Chew   Vaping Use   • Vaping Use: Never used   Substance Use Topics   • Alcohol use: Yes     Comment: socially   • Drug use: No        Review of Systems   Constitutional: Negative  HENT: Negative  Respiratory: Negative  Cardiovascular: Negative  Gastrointestinal: Negative  Musculoskeletal: Negative  Skin: Positive for rash (bilateral, itchy red rash on feet and ankles) and wound (open wound on bottom of right foot)  Athlete's foot   Allergic/Immunologic: Positive for environmental allergies  Neurological: Negative  All other systems reviewed and are negative  Physical Exam  ED Triage Vitals [04/24/23 0728]   Temperature Pulse Respirations Blood Pressure SpO2   97 6 °F (36 4 °C) 81 18 149/79 98 %      Temp Source Heart Rate Source Patient Position - Orthostatic VS BP Location FiO2 (%)   Temporal Monitor Sitting Right arm --      Pain Score       2             Orthostatic Vital Signs  Vitals:    04/24/23 0728   BP: 149/79   Pulse: 81   Patient Position - Orthostatic VS: Sitting       Physical Exam  Vitals reviewed  Constitutional:       General: He is not in acute distress  Appearance: Normal appearance  He is normal weight  He is not ill-appearing, toxic-appearing or diaphoretic  HENT:      Head: Normocephalic and atraumatic  Eyes:      Extraocular Movements: Extraocular movements intact  Musculoskeletal:         General: Normal range of motion  Feet:      Right foot:      Skin integrity: Erythema and warmth present  Left foot:      Skin integrity: Erythema and warmth present  Comments: Athlete's foot  Skin:     General: Skin is warm        Findings: Erythema and rash present  Rash is crusting  Comments: Bilateral lower extremity rash with erythema and warmth present, few small blisters with clear drainage, open wound on bottom of right foot, bleeding due to frequent scratching   Neurological:      General: No focal deficit present  Mental Status: He is alert  Mental status is at baseline  Sensory: No sensory deficit  Motor: No weakness  Psychiatric:         Mood and Affect: Mood normal          Behavior: Behavior normal          Thought Content: Thought content normal          ED Medications  Medications   predniSONE tablet 60 mg (60 mg Oral Given 4/24/23 0811)       Diagnostic Studies  Results Reviewed     None                 No orders to display         Procedures  Procedures      ED Course  ED Course as of 04/24/23 0848   Mon Apr 24, 2023   0727 (SEE MDM)    Will treat for Contact Dermatitis    Given one dose of Prednisone 60 mg in ED    Discharged on Prednisone 60 mg x 5 days for worsening dermatitis and Lotrisone cream for athlete's foot  Medical Decision Making  Patient is a 49 yo male with PMH of allergic rhinitis and seasonal allergies presenting with worsening bilateral lower extremity rash  He had athlete's foot for ~2 weeks and developed a bilateral foot rash 3 days ago  He has had no improvement on 3 days of Keflex and rash has spread  He has been using Lotrimin and keeping feet dry and clean  He reports severe pruritis with no relief from Benadryl, painful open wound on bottom of right foot since yesterday, bleeding at ankles from scratching, and a few clear-draining blisters  He states he was doing yard work mowing lawn 1 week prior to developing rash  He has an appointment scheduled with Podiatry in 2 days  On exam, patient has a bilateral lower extremity rash, erythema and warmth present, few small blisters with clear drainage, open wound on bottom of right foot, and some bleeding at ankles      Due to symmetry of rash, presentation is most consistent with Contact Dermatitis due to recent yard work  Patient given one dose of Prednisone 60 mg in ED  Patient stable for discharge  Discharged with Prednisone 60 mg x 5 days and Lotrisone cream for athlete's foot  Instructed patient to continue follow-up appointment with Podiatry in 2 days  Return if any worsening of rash, blistering, drainage, swelling, fevers/chills, difficulty ambulating, or severe pain develops  Follow-up with PCP  Risk  Prescription drug management  Disposition  Final diagnoses:   Contact dermatitis     Time reflects when diagnosis was documented in both MDM as applicable and the Disposition within this note     Time User Action Codes Description Comment    4/24/2023  8:09 AM Estanislado Due Add [L25 9] Contact dermatitis       ED Disposition     ED Disposition   Discharge    Condition   Stable    Date/Time   Mon Apr 24, 2023  8:10 AM    Comment   Cricket Kapoor discharge to home/self care  Follow-up Information     Follow up With Specialties Details Why Contact Timmothy Hammans, MD Nephrology, Internal Medicine   [address] [facility] [address]   [phone]            Discharge Medication List as of 4/24/2023  8:12 AM      START taking these medications    Details   clotrimazole-betamethasone (LOTRISONE) 1-0 05 % cream Apply to affected area 2 times daily prn, Normal      !! predniSONE 20 mg tablet Take 3 tablets (60 mg total) by mouth daily for 5 days, Starting Mon 4/24/2023, Until Sat 4/29/2023, Normal       !! - Potential duplicate medications found  Please discuss with provider        CONTINUE these medications which have NOT CHANGED    Details   Ascorbic Acid (VITAMIN C) 1000 MG tablet Take 1,000 mg by mouth daily, Historical Med      cephalexin (KEFLEX) 500 mg capsule Take 1 capsule (500 mg total) by mouth every 6 (six) hours for 10 days, Starting Fri 4/21/2023, Until Mon 5/1/2023, Normal      cholecalciferol (VITAMIN "D3) 400 units tablet Take 400 Units by mouth daily, Historical Med      citalopram (CeleXA) 20 mg tablet 20 mg daily  , Starting Mon 12/13/2010, Historical Med      EPINEPHrine (EPIPEN) 0 3 mg/0 3 mL SOAJ Inject 0 3 mL (0 3 mg total) into a muscle once for 1 dose As needed for allergy reaction, Starting Tue 9/6/2022, Normal      ferrous sulfate 325 (65 Fe) mg tablet Take 325 mg by mouth daily with breakfast, Historical Med      latanoprost (XALATAN) 0 005 % ophthalmic solution place 1 drop into both eyes daily, Historical Med      Levothyroxine Sodium 88 MCG CAPS 88 mcg daily in the early morning , Starting Wed 12/2/2015, Historical Med      montelukast (SINGULAIR) 10 mg tablet Take 1 tablet (10 mg total) by mouth daily at bedtime, Starting Tue 9/6/2022, Normal      Multiple Vitamin (MULTIVITAMIN) tablet Take 1 tablet by mouth daily, Historical Med      naproxen (NAPROSYN) 375 mg tablet Take 1 tablet (375 mg total) by mouth 2 (two) times a day as needed for moderate pain, Starting Fri 5/29/2020, Normal      !! predniSONE 20 mg tablet 3 tabs by mouth daily x 3 days, 2 tabs by mouth daily x 3 days, then 1 tab by mouth daily x 3 days, Normal      Tuberculin-Allergy Syringes (ALLERGY SYRINGE 1CC/27GX1/2\") 27G X 1/2\" 1 ML MISC by Does not apply route once a week 2 allergy injections once per month, Starting Mon 8/12/2019, Normal      Tuberculin-Allergy Syringes (B-D ALLERGY SYRINGE 1CC/28G) 28G X 1/2\" 1 ML MISC 2 INJECTIONS EVERY 4-5 WEEKS, Normal      valsartan-hydrochlorothiazide (DIOVAN-HCT) 80-12 5 MG per tablet Take 1 tablet by mouth daily, Historical Med       !! - Potential duplicate medications found  Please discuss with provider  No discharge procedures on file  PDMP Review     None           ED Provider  Attending physically available and evaluated Jacinta Granados  RAULITO managed the patient along with the ED Attending      Electronically Signed by         Neela Oswald DO  04/24/23 0848       Caty " Maikel Garcia, [place]  04/24/23 0246

## 2023-04-24 NOTE — Clinical Note
Olga Michel was seen and treated in our emergency department on 4/24/2023  Diagnosis:     Patrick Garrido  is off the rest of the shift today, may return to work on return date  He may return on this date: 04/25/2023         If you have any questions or concerns, please don't hesitate to call        Danna Longoria, DO    ______________________________           _______________          _______________  Hospital Representative                              Date                                Time

## 2023-04-24 NOTE — ED ATTENDING ATTESTATION
4/24/2023  I, Irl Form DO K[place], saw and evaluated the patient  I have discussed the patient with the resident/non-physician practitioner and agree with the resident's/non-physician practitioner's findings, Plan of Care, and MDM as documented in the resident's/non-physician practitioner's note, except where noted  All available labs and Radiology studies were reviewed  I was present for key portions of any procedure(s) performed by the resident/non-physician practitioner and I was immediately available to provide assistance  At this point I agree with the current assessment done in the Emergency Department  I have conducted an independent evaluation of this patient a history and physical is as follows:    ED Course       49 y/o male c/o 5 - 6 days of bilateral lower extremity erythema and pruritus  Patient was seen in outside urgent care facility 4 days ago and and placed on Keflex for bilateral lower extremity cellulitis  He states he mowed his lawn approximate 10 days ago but did not notice any symptoms until about 4 days later when he noted some mild itching in his bilateral ankle region and foot  He also noted an area of athlete's foot in between his first and second toes on the left foot  Over the next 2 days his redness and erythema increased along with increased pruritus  Was placed on Keflex but states area for extended past were demarcated on Friday  Denies fevers or chills  Area is symmetrically spreading on both sides  Suspect contact dermatitis as opposed to cellulitis but recommended to continue antibiotics until completed given his already his fourth daily course  We will also place on p o  prednisone for symptomatic contact dermatitis and patient also has appointment with his podiatrist in 2 days  Recommended to continue that appointment as scheduled  Return condition worsens        Critical Care Time  Procedures

## 2023-06-21 ENCOUNTER — TRANSCRIBE ORDERS (OUTPATIENT)
Dept: ADMINISTRATIVE | Facility: HOSPITAL | Age: 53
End: 2023-06-21

## 2023-06-21 DIAGNOSIS — K21.9 GASTROESOPHAGEAL REFLUX DISEASE WITHOUT ESOPHAGITIS: ICD-10-CM

## 2023-06-21 DIAGNOSIS — R94.5 ABNORMAL RESULTS OF LIVER FUNCTION STUDIES: Primary | ICD-10-CM

## 2023-06-21 DIAGNOSIS — D64.9 ANEMIA, UNSPECIFIED TYPE: ICD-10-CM

## 2023-06-21 DIAGNOSIS — K64.8 OTHER HEMORRHOIDS: ICD-10-CM

## 2023-06-27 ENCOUNTER — APPOINTMENT (OUTPATIENT)
Dept: LAB | Facility: CLINIC | Age: 53
End: 2023-06-27
Payer: COMMERCIAL

## 2023-06-27 DIAGNOSIS — R94.5 ABNORMAL RESULTS OF LIVER FUNCTION STUDIES: ICD-10-CM

## 2023-06-27 DIAGNOSIS — D64.9 ANEMIA, UNSPECIFIED TYPE: ICD-10-CM

## 2023-06-27 DIAGNOSIS — K21.9 GASTROESOPHAGEAL REFLUX DISEASE WITHOUT ESOPHAGITIS: ICD-10-CM

## 2023-06-27 DIAGNOSIS — K64.8 OTHER HEMORRHOIDS: ICD-10-CM

## 2023-06-27 LAB
AFP-TM SERPL-MCNC: 2.43 NG/ML (ref 0–9)
ANA SER QL IA: NEGATIVE
B BURGDOR IGG+IGM SER-ACNC: <0.2 AI
GGT SERPL-CCNC: 135 U/L (ref 5–85)

## 2023-06-27 PROCEDURE — 82977 ASSAY OF GGT: CPT

## 2023-06-27 PROCEDURE — 82390 ASSAY OF CERULOPLASMIN: CPT

## 2023-06-27 PROCEDURE — 82103 ALPHA-1-ANTITRYPSIN TOTAL: CPT

## 2023-06-27 PROCEDURE — 86038 ANTINUCLEAR ANTIBODIES: CPT

## 2023-06-27 PROCEDURE — 86381 MITOCHONDRIAL ANTIBODY EACH: CPT

## 2023-06-27 PROCEDURE — 86015 ACTIN ANTIBODY EACH: CPT

## 2023-06-27 PROCEDURE — 82105 ALPHA-FETOPROTEIN SERUM: CPT

## 2023-06-27 PROCEDURE — 36415 COLL VENOUS BLD VENIPUNCTURE: CPT

## 2023-06-27 PROCEDURE — 86618 LYME DISEASE ANTIBODY: CPT

## 2023-06-28 ENCOUNTER — HOSPITAL ENCOUNTER (OUTPATIENT)
Dept: ULTRASOUND IMAGING | Facility: HOSPITAL | Age: 53
Discharge: HOME | End: 2023-06-28
Payer: COMMERCIAL

## 2023-06-28 DIAGNOSIS — R94.5 ABNORMAL RESULTS OF LIVER FUNCTION STUDIES: ICD-10-CM

## 2023-06-28 LAB
A1AT SERPL-MCNC: 119 MG/DL (ref 101–187)
ACTIN IGG SERPL-ACNC: 7 UNITS (ref 0–19)
CERULOPLASMIN SERPL-MCNC: 24.4 MG/DL (ref 16–31)
MITOCHONDRIA M2 IGG SER-ACNC: <20 UNITS (ref 0–20)

## 2023-06-28 PROCEDURE — 76700 US EXAM ABDOM COMPLETE: CPT

## 2023-08-03 ENCOUNTER — APPOINTMENT (OUTPATIENT)
Dept: LAB | Facility: CLINIC | Age: 53
End: 2023-08-03
Payer: COMMERCIAL

## 2023-08-03 ENCOUNTER — TRANSCRIBE ORDERS (OUTPATIENT)
Dept: LAB | Facility: CLINIC | Age: 53
End: 2023-08-03

## 2023-08-03 DIAGNOSIS — I10 ESSENTIAL HYPERTENSION, MALIGNANT: Primary | ICD-10-CM

## 2023-08-03 DIAGNOSIS — I10 ESSENTIAL HYPERTENSION, MALIGNANT: ICD-10-CM

## 2023-08-03 DIAGNOSIS — R53.83 OTHER FATIGUE: ICD-10-CM

## 2023-08-03 DIAGNOSIS — R42 DIZZINESS AND GIDDINESS: ICD-10-CM

## 2023-08-03 LAB
ALBUMIN SERPL BCP-MCNC: 4 G/DL (ref 3.5–5)
ALBUMIN SERPL BCP-MCNC: 4.1 G/DL (ref 3.5–5)
ALP SERPL-CCNC: 55 U/L (ref 46–116)
ALP SERPL-CCNC: 57 U/L (ref 46–116)
ALT SERPL W P-5'-P-CCNC: 44 U/L (ref 12–78)
ALT SERPL W P-5'-P-CCNC: 48 U/L (ref 12–78)
ANION GAP SERPL CALCULATED.3IONS-SCNC: 9 MMOL/L
AST SERPL W P-5'-P-CCNC: 33 U/L (ref 5–45)
AST SERPL W P-5'-P-CCNC: 37 U/L (ref 5–45)
BASOPHILS # BLD AUTO: 0.1 THOUSANDS/ÂΜL (ref 0–0.1)
BASOPHILS NFR BLD AUTO: 1 % (ref 0–1)
BILIRUB DIRECT SERPL-MCNC: 0.12 MG/DL (ref 0–0.2)
BILIRUB DIRECT SERPL-MCNC: 0.13 MG/DL (ref 0–0.2)
BILIRUB SERPL-MCNC: 0.41 MG/DL (ref 0.2–1)
BILIRUB SERPL-MCNC: 0.43 MG/DL (ref 0.2–1)
BUN SERPL-MCNC: 15 MG/DL (ref 5–25)
CALCIUM SERPL-MCNC: 9 MG/DL (ref 8.3–10.1)
CHLORIDE SERPL-SCNC: 105 MMOL/L (ref 96–108)
CO2 SERPL-SCNC: 27 MMOL/L (ref 21–32)
CREAT SERPL-MCNC: 0.92 MG/DL (ref 0.6–1.3)
EOSINOPHIL # BLD AUTO: 0.16 THOUSAND/ÂΜL (ref 0–0.61)
EOSINOPHIL NFR BLD AUTO: 2 % (ref 0–6)
ERYTHROCYTE [DISTWIDTH] IN BLOOD BY AUTOMATED COUNT: 13.2 % (ref 11.6–15.1)
GFR SERPL CREATININE-BSD FRML MDRD: 95 ML/MIN/1.73SQ M
GLUCOSE P FAST SERPL-MCNC: 116 MG/DL (ref 65–99)
HCT VFR BLD AUTO: 42.3 % (ref 36.5–49.3)
HGB BLD-MCNC: 14 G/DL (ref 12–17)
IMM GRANULOCYTES # BLD AUTO: 0.02 THOUSAND/UL (ref 0–0.2)
IMM GRANULOCYTES NFR BLD AUTO: 0 % (ref 0–2)
LYMPHOCYTES # BLD AUTO: 1.78 THOUSANDS/ÂΜL (ref 0.6–4.47)
LYMPHOCYTES NFR BLD AUTO: 23 % (ref 14–44)
MCH RBC QN AUTO: 31 PG (ref 26.8–34.3)
MCHC RBC AUTO-ENTMCNC: 33.1 G/DL (ref 31.4–37.4)
MCV RBC AUTO: 94 FL (ref 82–98)
MONOCYTES # BLD AUTO: 0.7 THOUSAND/ÂΜL (ref 0.17–1.22)
MONOCYTES NFR BLD AUTO: 9 % (ref 4–12)
NEUTROPHILS # BLD AUTO: 4.88 THOUSANDS/ÂΜL (ref 1.85–7.62)
NEUTS SEG NFR BLD AUTO: 65 % (ref 43–75)
NRBC BLD AUTO-RTO: 0 /100 WBCS
PLATELET # BLD AUTO: 360 THOUSANDS/UL (ref 149–390)
PMV BLD AUTO: 10 FL (ref 8.9–12.7)
POTASSIUM SERPL-SCNC: 4.2 MMOL/L (ref 3.5–5.3)
PROT SERPL-MCNC: 7.1 G/DL (ref 6.4–8.4)
PROT SERPL-MCNC: 7.2 G/DL (ref 6.4–8.4)
RBC # BLD AUTO: 4.52 MILLION/UL (ref 3.88–5.62)
SODIUM SERPL-SCNC: 141 MMOL/L (ref 135–147)
WBC # BLD AUTO: 7.64 THOUSAND/UL (ref 4.31–10.16)

## 2023-08-03 PROCEDURE — 80053 COMPREHEN METABOLIC PANEL: CPT

## 2023-08-03 PROCEDURE — 82248 BILIRUBIN DIRECT: CPT

## 2023-08-03 PROCEDURE — 85025 COMPLETE CBC W/AUTO DIFF WBC: CPT

## 2023-08-03 PROCEDURE — 83036 HEMOGLOBIN GLYCOSYLATED A1C: CPT

## 2023-08-03 PROCEDURE — 36415 COLL VENOUS BLD VENIPUNCTURE: CPT

## 2023-08-03 PROCEDURE — 80076 HEPATIC FUNCTION PANEL: CPT

## 2023-08-04 LAB
EST. AVERAGE GLUCOSE BLD GHB EST-MCNC: 114 MG/DL
HBA1C MFR BLD: 5.6 %

## 2023-08-07 ENCOUNTER — APPOINTMENT (EMERGENCY)
Dept: RADIOLOGY | Facility: HOSPITAL | Age: 53
End: 2023-08-07
Payer: COMMERCIAL

## 2023-08-07 ENCOUNTER — HOSPITAL ENCOUNTER (EMERGENCY)
Facility: HOSPITAL | Age: 53
Discharge: HOME | End: 2023-08-07
Attending: EMERGENCY MEDICINE | Admitting: EMERGENCY MEDICINE
Payer: COMMERCIAL

## 2023-08-07 VITALS
SYSTOLIC BLOOD PRESSURE: 146 MMHG | TEMPERATURE: 98.7 F | RESPIRATION RATE: 20 BRPM | HEART RATE: 77 BPM | DIASTOLIC BLOOD PRESSURE: 97 MMHG | OXYGEN SATURATION: 94 %

## 2023-08-07 DIAGNOSIS — R07.9 CHEST PAIN: Primary | ICD-10-CM

## 2023-08-07 LAB
ANION GAP SERPL CALCULATED.3IONS-SCNC: 11 MMOL/L
BASOPHILS # BLD AUTO: 0.08 THOUSANDS/ÂΜL (ref 0–0.1)
BASOPHILS NFR BLD AUTO: 1 % (ref 0–1)
BUN SERPL-MCNC: 16 MG/DL (ref 5–25)
CALCIUM SERPL-MCNC: 9.1 MG/DL (ref 8.4–10.2)
CARDIAC TROPONIN I PNL SERPL HS: 4 NG/L
CHLORIDE SERPL-SCNC: 95 MMOL/L (ref 96–108)
CO2 SERPL-SCNC: 29 MMOL/L (ref 21–32)
CREAT SERPL-MCNC: 0.83 MG/DL (ref 0.6–1.3)
EOSINOPHIL # BLD AUTO: 0.07 THOUSAND/ÂΜL (ref 0–0.61)
EOSINOPHIL NFR BLD AUTO: 1 % (ref 0–6)
ERYTHROCYTE [DISTWIDTH] IN BLOOD BY AUTOMATED COUNT: 12.8 % (ref 11.6–15.1)
GFR SERPL CREATININE-BSD FRML MDRD: 101 ML/MIN/1.73SQ M
GLUCOSE SERPL-MCNC: 99 MG/DL (ref 65–140)
HCT VFR BLD AUTO: 42.4 % (ref 36.5–49.3)
HGB BLD-MCNC: 14.2 G/DL (ref 12–17)
IMM GRANULOCYTES # BLD AUTO: 0.02 THOUSAND/UL (ref 0–0.2)
IMM GRANULOCYTES NFR BLD AUTO: 0 % (ref 0–2)
LYMPHOCYTES # BLD AUTO: 2.44 THOUSANDS/ÂΜL (ref 0.6–4.47)
LYMPHOCYTES NFR BLD AUTO: 28 % (ref 14–44)
MCH RBC QN AUTO: 31.3 PG (ref 26.8–34.3)
MCHC RBC AUTO-ENTMCNC: 33.5 G/DL (ref 31.4–37.4)
MCV RBC AUTO: 93 FL (ref 82–98)
MONOCYTES # BLD AUTO: 0.78 THOUSAND/ÂΜL (ref 0.17–1.22)
MONOCYTES NFR BLD AUTO: 9 % (ref 4–12)
NEUTROPHILS # BLD AUTO: 5.33 THOUSANDS/ÂΜL (ref 1.85–7.62)
NEUTS SEG NFR BLD AUTO: 61 % (ref 43–75)
NRBC BLD AUTO-RTO: 0 /100 WBCS
PLATELET # BLD AUTO: 351 THOUSANDS/UL (ref 149–390)
PMV BLD AUTO: 9.5 FL (ref 8.9–12.7)
POTASSIUM SERPL-SCNC: 3.8 MMOL/L (ref 3.5–5.3)
RBC # BLD AUTO: 4.54 MILLION/UL (ref 3.88–5.62)
SODIUM SERPL-SCNC: 135 MMOL/L (ref 135–147)
TSH SERPL DL<=0.05 MIU/L-ACNC: 3.43 UIU/ML (ref 0.45–4.5)
WBC # BLD AUTO: 8.72 THOUSAND/UL (ref 4.31–10.16)

## 2023-08-07 PROCEDURE — 85025 COMPLETE CBC W/AUTO DIFF WBC: CPT | Performed by: EMERGENCY MEDICINE

## 2023-08-07 PROCEDURE — 84443 ASSAY THYROID STIM HORMONE: CPT | Performed by: EMERGENCY MEDICINE

## 2023-08-07 PROCEDURE — 84484 ASSAY OF TROPONIN QUANT: CPT | Performed by: EMERGENCY MEDICINE

## 2023-08-07 PROCEDURE — 36415 COLL VENOUS BLD VENIPUNCTURE: CPT | Performed by: EMERGENCY MEDICINE

## 2023-08-07 PROCEDURE — 80048 BASIC METABOLIC PNL TOTAL CA: CPT | Performed by: EMERGENCY MEDICINE

## 2023-08-07 PROCEDURE — 99285 EMERGENCY DEPT VISIT HI MDM: CPT

## 2023-08-07 PROCEDURE — 71045 X-RAY EXAM CHEST 1 VIEW: CPT

## 2023-08-07 NOTE — ED PROVIDER NOTES
History  Chief Complaint   Patient presents with   • Chest Pain     Chest pain that began overnight. It would come and go. 47 yo male presenting to the ed for chest pain started last night after  training drill. Eased up a little bit and then got worse today, radiating to his L shoulder. Pain described as stabbing. No exertional component to the pain. Denies SOB, N/V, sweating, dizziness. Radiates to the L shoulder. Nothing seems to make it better. Had an episode similar to this in the past without radiation that he states was likely due to anxiety. Prior to Admission Medications   Prescriptions Last Dose Informant Patient Reported? Taking?    Ascorbic Acid (VITAMIN C) 1000 MG tablet  Self Yes No   Sig: Take 1,000 mg by mouth daily   EPINEPHrine (EPIPEN) 0.3 mg/0.3 mL SOAJ   No No   Sig: Inject 0.3 mL (0.3 mg total) into a muscle once for 1 dose As needed for allergy reaction   Levothyroxine Sodium 88 MCG CAPS  Self Yes No   Si mcg daily in the early morning    Multiple Vitamin (MULTIVITAMIN) tablet  Self Yes No   Sig: Take 1 tablet by mouth daily   Tuberculin-Allergy Syringes (ALLERGY SYRINGE 1CC/27GX1/2") 27G X 1/2" 1 ML MISC  Self No No   Sig: by Does not apply route once a week 2 allergy injections once per month   Patient not taking: Reported on 2021   Tuberculin-Allergy Syringes (B-D ALLERGY SYRINGE 1CC/28G) 28G X 1/2" 1 ML MISC   No No   Si INJECTIONS EVERY 4-5 WEEKS   cholecalciferol (VITAMIN D3) 400 units tablet   Yes No   Sig: Take 400 Units by mouth daily   citalopram (CeleXA) 20 mg tablet  Self Yes No   Si mg daily     clotrimazole-betamethasone (LOTRISONE) 1-0.05 % cream   No No   Sig: Apply to affected area 2 times daily prn   ferrous sulfate 325 (65 Fe) mg tablet   Yes No   Sig: Take 325 mg by mouth daily with breakfast   latanoprost (XALATAN) 0.005 % ophthalmic solution  Self Yes No   Sig: place 1 drop into both eyes daily   montelukast (SINGULAIR) 10 mg tablet   No No   Sig: Take 1 tablet (10 mg total) by mouth daily at bedtime   naproxen (NAPROSYN) 375 mg tablet  Self No No   Sig: Take 1 tablet (375 mg total) by mouth 2 (two) times a day as needed for moderate pain   Patient not taking: Reported on 8/24/2020   predniSONE 20 mg tablet   No No   Sig: 3 tabs by mouth daily x 3 days, 2 tabs by mouth daily x 3 days, then 1 tab by mouth daily x 3 days   Patient not taking: Reported on 4/21/2023   valsartan-hydrochlorothiazide (DIOVAN-HCT) 80-12.5 MG per tablet  Self Yes No   Sig: Take 1 tablet by mouth daily      Facility-Administered Medications: None       Past Medical History:   Diagnosis Date   • Allergic rhinitis    • Anxiety    • Disease of thyroid gland    • Hypertension        Past Surgical History:   Procedure Laterality Date   • ADENOIDECTOMY     • CHOLECYSTECTOMY     • FOOT SURGERY Left    • KNEE ARTHROPLASTY Right    • NASAL SEPTUM SURGERY     • ROTATOR CUFF REPAIR Bilateral    • TONSILLECTOMY  childhood       Family History   Problem Relation Age of Onset   • No Known Problems Mother    • Hypertension Father    • Hypertension Paternal Grandmother    • Hypertension Paternal Grandfather      I have reviewed and agree with the history as documented. E-Cigarette/Vaping   • E-Cigarette Use Never User      E-Cigarette/Vaping Substances   • Nicotine No    • THC No    • CBD No    • Flavoring No    • Other No    • Unknown No      Social History     Tobacco Use   • Smoking status: Never   • Smokeless tobacco: Current     Types: Chew   Vaping Use   • Vaping Use: Never used   Substance Use Topics   • Alcohol use: Yes     Comment: socially   • Drug use: No       Review of Systems   Cardiovascular: Positive for chest pain. All other systems reviewed and are negative. Physical Exam  Physical Exam  Vitals and nursing note reviewed. Constitutional:       General: He is not in acute distress. Appearance: He is well-developed. He is not diaphoretic.    HENT: Head: Normocephalic and atraumatic. Right Ear: External ear normal.      Left Ear: External ear normal.      Nose: Nose normal.   Eyes:      General: No scleral icterus. Right eye: No discharge. Left eye: No discharge. Conjunctiva/sclera: Conjunctivae normal.   Cardiovascular:      Rate and Rhythm: Normal rate and regular rhythm. Heart sounds: Normal heart sounds. No murmur heard. No friction rub. No gallop. Pulmonary:      Effort: Pulmonary effort is normal. No respiratory distress. Breath sounds: Normal breath sounds. No decreased breath sounds, wheezing, rhonchi or rales. Abdominal:      General: Bowel sounds are normal. There is no distension. Palpations: Abdomen is soft. There is no mass. Tenderness: There is no abdominal tenderness. There is no guarding. Musculoskeletal:         General: No tenderness or deformity. Normal range of motion. Cervical back: Normal range of motion and neck supple. Skin:     General: Skin is warm and dry. Coloration: Skin is not pale. Findings: No erythema or rash. Neurological:      Mental Status: He is alert and oriented to person, place, and time. Psychiatric:         Behavior: Behavior normal.         Thought Content:  Thought content normal.         Judgment: Judgment normal.         Vital Signs  ED Triage Vitals [08/07/23 1233]   Temperature Pulse Respirations Blood Pressure SpO2   98.7 °F (37.1 °C) 77 20 146/97 94 %      Temp Source Heart Rate Source Patient Position - Orthostatic VS BP Location FiO2 (%)   Temporal Monitor Sitting Left arm --      Pain Score       4           Vitals:    08/07/23 1233   BP: 146/97   Pulse: 77   Patient Position - Orthostatic VS: Sitting         Visual Acuity      ED Medications  Medications - No data to display    Diagnostic Studies  Results Reviewed     Procedure Component Value Units Date/Time    TSH, 3rd generation with Free T4 reflex [[id number]]  (Normal) Collected: 08/07/23 1251    Lab Status: Final result Specimen: Blood from Arm, Right Updated: 08/07/23 1330     TSH 3RD GENERATON 3.429 uIU/mL     HS Troponin 0hr (reflex protocol) [[id number]]  (Normal) Collected: 08/07/23 1251    Lab Status: Final result Specimen: Blood from Arm, Right Updated: 08/07/23 1330     hs TnI 0hr 4 ng/L     Basic metabolic panel [[id number]]  (Abnormal) Collected: 08/07/23 1251    Lab Status: Final result Specimen: Blood from Arm, Right Updated: 08/07/23 1316     Sodium 135 mmol/L      Potassium 3.8 mmol/L      Chloride 95 mmol/L      CO2 29 mmol/L      ANION GAP 11 mmol/L      BUN 16 mg/dL      Creatinine 0.83 mg/dL      Glucose 99 mg/dL      Calcium 9.1 mg/dL      eGFR 101 ml/min/1.73sq m     Narrative:      [facility] guidelines for Chronic Kidney Disease (CKD):   •  Stage 1 with normal or high GFR (GFR > 90 mL/min/1.73 square meters)  •  Stage 2 Mild CKD (GFR = 60-89 mL/min/1.73 square meters)  •  Stage 3A Moderate CKD (GFR = 45-59 mL/min/1.73 square meters)  •  Stage 3B Moderate CKD (GFR = 30-44 mL/min/1.73 square meters)  •  Stage 4 Severe CKD (GFR = 15-29 mL/min/1.73 square meters)  •  Stage 5 End Stage CKD (GFR <15 mL/min/1.73 square meters)  Note: GFR calculation is accurate only with a steady state creatinine    CBC and differential [[id number]] Collected: 08/07/23 1251    Lab Status: Final result Specimen: Blood from Arm, Right Updated: 08/07/23 1259     WBC 8.72 Thousand/uL      RBC 4.54 Million/uL      Hemoglobin 14.2 g/dL      Hematocrit 42.4 %      MCV 93 fL      MCH 31.3 pg      MCHC 33.5 g/dL      RDW 12.8 %      MPV 9.5 fL      Platelets 129 Thousands/uL      nRBC 0 /100 WBCs      Neutrophils Relative 61 %      Immat GRANS % 0 %      Lymphocytes Relative 28 %      Monocytes Relative 9 %      Eosinophils Relative 1 %      Basophils Relative 1 %      Neutrophils Absolute 5.33 Thousands/µL      Immature Grans Absolute 0.02 Thousand/uL      Lymphocytes Absolute 2.44 Thousands/µL      Monocytes Absolute 0.78 Thousand/µL      Eosinophils Absolute 0.07 Thousand/µL      Basophils Absolute 0.08 Thousands/µL                  XR chest 1 view portable   ED Interpretation by Brandi Ross DO (08/07 1319)   No acute cardio/pulmonary disease noted on my interpretation          Final Result by Monik Lorenzo MD (08/07 1358)      No acute cardiopulmonary disease. Workstation performed: [id number]                    Procedures  ECG 12 Lead Documentation Only    Date/Time: 8/7/2023 12:59 PM    Performed by: Brandi Ross DO  Authorized by: Brandi Ross DO    Rate:     ECG rate:  75    ECG rate assessment: normal    Rhythm:     Rhythm: sinus rhythm    Ectopy:     Ectopy: none    QRS:     QRS axis:  Normal    QRS intervals:  Normal  Conduction:     Conduction: normal    ST segments:     ST segments:  Normal  T waves:     T waves: normal               ED Course  ED Course as of 08/07/23 1627   Mon Aug 07, 2023   1339 hs TnI 0hr: 4             HEART Risk Score    Flowsheet Row Most Recent Value   Heart Score Risk Calculator    History 0 Filed at: 08/07/2023 1626   ECG 0 Filed at: 08/07/2023 1626   Age 1 Filed at: 08/07/2023 1626   Risk Factors 1 Filed at: 08/07/2023 1626   Troponin 0 Filed at: 08/07/2023 1626   HEART Score 2 Filed at: 08/07/2023 1626                        SBIRT 20yo+    Flowsheet Row Most Recent Value   Initial Alcohol Screen: US AUDIT-C     1. How often do you have a drink containing alcohol? 0 Filed at: 08/07/2023 1313   2. How many drinks containing alcohol do you have on a typical day you are drinking? 0 Filed at: 08/07/2023 1313   3a. Male UNDER 65: How often do you have five or more drinks on one occasion? 0 Filed at: 08/07/2023 1313   3b. FEMALE Any Age, or MALE 65+: How often do you have 4 or more drinks on one occassion?  0 Filed at: 08/07/2023 1313   Audit-C Score 0 Filed at: 08/07/2023 1313   CAMERON: How many times in the past year have you. .. Used an illegal drug or used a prescription medication for non-medical reasons? Never Filed at: 08/07/2023 1313                    Medical Decision Making  42-year-old male with chest pain feels sharp and intermittent with pinpricks into the left shoulder. Denies shortness of breath, exertional component with this. Denies any other symptoms. Denies anything that seems to make it better or worse. Cardiac work-up with EKG showing no obvious signs of ischemia. No elevation in troponin and blood work otherwise unremarkable. Strict return precautions discussed and provided, recommend follow with primary care. Amount and/or Complexity of Data Reviewed  Labs: ordered. Decision-making details documented in ED Course. Radiology: ordered and independent interpretation performed. Disposition  Final diagnoses:   Chest pain     Time reflects when diagnosis was documented in both MDM as applicable and the Disposition within this note     Time User Action Codes Description Comment    8/7/2023  2:14 PM Gosia Her Add [R07.9] Chest pain       ED Disposition     ED Disposition   Discharge    Condition   Stable    Date/Time   Mon Aug 7, 2023  2:14 PM    [address] discharge to home/self care.                Follow-up Information     Follow up With Specialties Details Why Contact Info Additional Information    Roly Vu MD Nephrology, Internal Medicine   [id number] [facility],[address] [id number]  [facility] Emergency Department Emergency Medicine Go to  As needed, If symptoms worsen [address] [place] [id number]  [phone] [address] [facility] Emergency Department, [address] [facility], [address] [facility]          Discharge Medication List as of 8/7/2023  2:21 PM      CONTINUE these medications which have NOT CHANGED    Details   Ascorbic Acid (VITAMIN C) 1000 MG tablet Take 1,000 mg by mouth daily, Historical Med      cholecalciferol (VITAMIN D3) 400 units tablet Take 400 Units by mouth daily, Historical Med      citalopram (CeleXA) 20 mg tablet 20 mg daily  , Starting Mon 12/13/2010, Historical Med      clotrimazole-betamethasone (Sherlon Poole) 1-0.05 % cream Apply to affected area 2 times daily prn, Normal      EPINEPHrine (EPIPEN) 0.3 mg/0.3 mL SOAJ Inject 0.3 mL (0.3 mg total) into a muscle once for 1 dose As needed for allergy reaction, Starting Tue 9/6/2022, Normal      ferrous sulfate 325 (65 Fe) mg tablet Take 325 mg by mouth daily with breakfast, Historical Med      latanoprost (XALATAN) 0.005 % ophthalmic solution place 1 drop into both eyes daily, Historical Med      Levothyroxine Sodium 88 MCG CAPS 88 mcg daily in the early morning , Starting Wed 12/2/2015, Historical Med      montelukast (SINGULAIR) 10 mg tablet Take 1 tablet (10 mg total) by mouth daily at bedtime, Starting Tue 9/6/2022, Normal      Multiple Vitamin (MULTIVITAMIN) tablet Take 1 tablet by mouth daily, Historical Med      naproxen (NAPROSYN) 375 mg tablet Take 1 tablet (375 mg total) by mouth 2 (two) times a day as needed for moderate pain, Starting Fri 5/29/2020, Normal      predniSONE 20 mg tablet 3 tabs by mouth daily x 3 days, 2 tabs by mouth daily x 3 days, then 1 tab by mouth daily x 3 days, Normal      Tuberculin-Allergy Syringes (ALLERGY SYRINGE 1CC/27GX1/2") 27G X 1/2" 1 ML MISC by Does not apply route once a week 2 allergy injections once per month, Starting Mon 8/12/2019, Normal      Tuberculin-Allergy Syringes (B-D ALLERGY SYRINGE 1CC/28G) 28G X 1/2" 1 ML MISC 2 INJECTIONS EVERY 4-5 WEEKS, Normal      valsartan-hydrochlorothiazide (DIOVAN-HCT) 80-12.5 MG per tablet Take 1 tablet by mouth daily, Historical Med             No discharge procedures on file.     PDMP Review     None          ED Provider  Electronically Signed by           Libby Cross DO  08/07/23 3540

## 2023-10-27 ENCOUNTER — APPOINTMENT (OUTPATIENT)
Dept: LAB | Facility: CLINIC | Age: 53
End: 2023-10-27
Payer: COMMERCIAL

## 2023-10-27 DIAGNOSIS — M54.50 CHRONIC LOW BACK PAIN, UNSPECIFIED BACK PAIN LATERALITY, UNSPECIFIED WHETHER SCIATICA PRESENT: ICD-10-CM

## 2023-10-27 DIAGNOSIS — G89.29 CHRONIC LOW BACK PAIN, UNSPECIFIED BACK PAIN LATERALITY, UNSPECIFIED WHETHER SCIATICA PRESENT: ICD-10-CM

## 2023-10-27 DIAGNOSIS — M54.59 OTHER LOW BACK PAIN: ICD-10-CM

## 2023-10-27 LAB
CRP SERPL QL: 3.8 MG/L
ERYTHROCYTE [SEDIMENTATION RATE] IN BLOOD: 4 MM/HOUR (ref 0–19)

## 2023-10-27 PROCEDURE — 81374 HLA I TYPING 1 ANTIGEN LR: CPT

## 2023-10-27 PROCEDURE — 36415 COLL VENOUS BLD VENIPUNCTURE: CPT

## 2023-10-27 PROCEDURE — 86140 C-REACTIVE PROTEIN: CPT

## 2023-10-27 PROCEDURE — 85652 RBC SED RATE AUTOMATED: CPT

## 2023-11-06 LAB — HLA-B27 QL NAA+PROBE: NEGATIVE

## 2024-05-02 ENCOUNTER — TRANSCRIBE ORDERS (OUTPATIENT)
Dept: ADMINISTRATIVE | Facility: HOSPITAL | Age: 54
End: 2024-05-02

## 2024-05-02 ENCOUNTER — APPOINTMENT (OUTPATIENT)
Dept: LAB | Facility: HOSPITAL | Age: 54
End: 2024-05-02
Payer: COMMERCIAL

## 2024-05-02 DIAGNOSIS — K76.0 FATTY LIVER: ICD-10-CM

## 2024-05-02 DIAGNOSIS — K21.9 GASTROESOPHAGEAL REFLUX DISEASE WITHOUT ESOPHAGITIS: ICD-10-CM

## 2024-05-02 DIAGNOSIS — E66.9 OBESITY, UNSPECIFIED CLASSIFICATION, UNSPECIFIED OBESITY TYPE, UNSPECIFIED WHETHER SERIOUS COMORBIDITY PRESENT: ICD-10-CM

## 2024-05-02 DIAGNOSIS — R94.5 ABNORMAL RESULTS OF LIVER FUNCTION STUDIES: ICD-10-CM

## 2024-05-02 DIAGNOSIS — Z79.1 ENCOUNTER FOR LONG-TERM (CURRENT) USE OF NON-STEROIDAL ANTI-INFLAMMATORIES: Primary | ICD-10-CM

## 2024-05-02 LAB
BASOPHILS # BLD AUTO: 0.06 THOUSANDS/ÂΜL (ref 0–0.1)
BASOPHILS NFR BLD AUTO: 1 % (ref 0–1)
EOSINOPHIL # BLD AUTO: 0.18 THOUSAND/ÂΜL (ref 0–0.61)
EOSINOPHIL NFR BLD AUTO: 2 % (ref 0–6)
ERYTHROCYTE [DISTWIDTH] IN BLOOD BY AUTOMATED COUNT: 13.1 % (ref 11.6–15.1)
GGT SERPL-CCNC: 88 U/L (ref 9–64)
HCT VFR BLD AUTO: 42.1 % (ref 36.5–49.3)
HGB BLD-MCNC: 13.8 G/DL (ref 12–17)
IMM GRANULOCYTES # BLD AUTO: 0.03 THOUSAND/UL (ref 0–0.2)
IMM GRANULOCYTES NFR BLD AUTO: 0 % (ref 0–2)
LYMPHOCYTES # BLD AUTO: 2.21 THOUSANDS/ÂΜL (ref 0.6–4.47)
LYMPHOCYTES NFR BLD AUTO: 28 % (ref 14–44)
MCH RBC QN AUTO: 30.1 PG (ref 26.8–34.3)
MCHC RBC AUTO-ENTMCNC: 32.8 G/DL (ref 31.4–37.4)
MCV RBC AUTO: 92 FL (ref 82–98)
MONOCYTES # BLD AUTO: 0.72 THOUSAND/ÂΜL (ref 0.17–1.22)
MONOCYTES NFR BLD AUTO: 9 % (ref 4–12)
NEUTROPHILS # BLD AUTO: 4.77 THOUSANDS/ÂΜL (ref 1.85–7.62)
NEUTS SEG NFR BLD AUTO: 60 % (ref 43–75)
NRBC BLD AUTO-RTO: 0 /100 WBCS
PLATELET # BLD AUTO: 359 THOUSANDS/UL (ref 149–390)
PMV BLD AUTO: 9 FL (ref 8.9–12.7)
RBC # BLD AUTO: 4.58 MILLION/UL (ref 3.88–5.62)
WBC # BLD AUTO: 7.97 THOUSAND/UL (ref 4.31–10.16)

## 2024-05-02 PROCEDURE — 85025 COMPLETE CBC W/AUTO DIFF WBC: CPT

## 2024-05-02 PROCEDURE — 80053 COMPREHEN METABOLIC PANEL: CPT

## 2024-05-02 PROCEDURE — 82977 ASSAY OF GGT: CPT

## 2024-05-02 PROCEDURE — 36415 COLL VENOUS BLD VENIPUNCTURE: CPT

## 2024-05-08 LAB
ALBUMIN SERPL BCP-MCNC: 4.7 G/DL (ref 3.5–5)
ALP SERPL-CCNC: 57 U/L (ref 34–104)
ALT SERPL W P-5'-P-CCNC: 39 U/L (ref 7–52)
ANION GAP SERPL CALCULATED.3IONS-SCNC: 8 MMOL/L (ref 4–13)
AST SERPL W P-5'-P-CCNC: 37 U/L (ref 13–39)
BILIRUB SERPL-MCNC: 0.46 MG/DL (ref 0.2–1)
BUN SERPL-MCNC: 12 MG/DL (ref 5–25)
CALCIUM SERPL-MCNC: 9.2 MG/DL (ref 8.4–10.2)
CHLORIDE SERPL-SCNC: 98 MMOL/L (ref 96–108)
CO2 SERPL-SCNC: 31 MMOL/L (ref 21–32)
CREAT SERPL-MCNC: 0.84 MG/DL (ref 0.6–1.3)
GFR SERPL CREATININE-BSD FRML MDRD: 101 ML/MIN/1.73SQ M
GLUCOSE P FAST SERPL-MCNC: 113 MG/DL (ref 65–99)
POTASSIUM SERPL-SCNC: 3.9 MMOL/L (ref 3.5–5.3)
PROT SERPL-MCNC: 7.2 G/DL (ref 6.4–8.4)
SODIUM SERPL-SCNC: 137 MMOL/L (ref 135–147)

## 2024-05-28 ENCOUNTER — APPOINTMENT (OUTPATIENT)
Dept: LAB | Facility: CLINIC | Age: 54
End: 2024-05-28
Payer: COMMERCIAL

## 2024-05-28 DIAGNOSIS — Z79.1 ENCOUNTER FOR LONG-TERM (CURRENT) USE OF NON-STEROIDAL ANTI-INFLAMMATORIES: ICD-10-CM

## 2024-05-28 LAB
ALBUMIN SERPL BCP-MCNC: 4.4 G/DL (ref 3.5–5)
ALP SERPL-CCNC: 62 U/L (ref 34–104)
ALT SERPL W P-5'-P-CCNC: 76 U/L (ref 7–52)
ANION GAP SERPL CALCULATED.3IONS-SCNC: 14 MMOL/L (ref 4–13)
AST SERPL W P-5'-P-CCNC: 42 U/L (ref 13–39)
BASOPHILS # BLD AUTO: 0.09 THOUSANDS/ÂΜL (ref 0–0.1)
BASOPHILS NFR BLD AUTO: 1 % (ref 0–1)
BILIRUB SERPL-MCNC: 0.36 MG/DL (ref 0.2–1)
BUN SERPL-MCNC: 14 MG/DL (ref 5–25)
CALCIUM SERPL-MCNC: 9.2 MG/DL (ref 8.4–10.2)
CHLORIDE SERPL-SCNC: 101 MMOL/L (ref 96–108)
CO2 SERPL-SCNC: 27 MMOL/L (ref 21–32)
CREAT SERPL-MCNC: 0.79 MG/DL (ref 0.6–1.3)
EOSINOPHIL # BLD AUTO: 0.12 THOUSAND/ÂΜL (ref 0–0.61)
EOSINOPHIL NFR BLD AUTO: 2 % (ref 0–6)
ERYTHROCYTE [DISTWIDTH] IN BLOOD BY AUTOMATED COUNT: 13.7 % (ref 11.6–15.1)
GFR SERPL CREATININE-BSD FRML MDRD: 103 ML/MIN/1.73SQ M
GLUCOSE P FAST SERPL-MCNC: 105 MG/DL (ref 65–99)
HCT VFR BLD AUTO: 43 % (ref 36.5–49.3)
HGB BLD-MCNC: 13.9 G/DL (ref 12–17)
IMM GRANULOCYTES # BLD AUTO: 0.03 THOUSAND/UL (ref 0–0.2)
IMM GRANULOCYTES NFR BLD AUTO: 1 % (ref 0–2)
LYMPHOCYTES # BLD AUTO: 2.3 THOUSANDS/ÂΜL (ref 0.6–4.47)
LYMPHOCYTES NFR BLD AUTO: 35 % (ref 14–44)
MCH RBC QN AUTO: 30.9 PG (ref 26.8–34.3)
MCHC RBC AUTO-ENTMCNC: 32.3 G/DL (ref 31.4–37.4)
MCV RBC AUTO: 96 FL (ref 82–98)
MONOCYTES # BLD AUTO: 0.67 THOUSAND/ÂΜL (ref 0.17–1.22)
MONOCYTES NFR BLD AUTO: 10 % (ref 4–12)
NEUTROPHILS # BLD AUTO: 3.42 THOUSANDS/ÂΜL (ref 1.85–7.62)
NEUTS SEG NFR BLD AUTO: 51 % (ref 43–75)
NRBC BLD AUTO-RTO: 0 /100 WBCS
PLATELET # BLD AUTO: 383 THOUSANDS/UL (ref 149–390)
PMV BLD AUTO: 9.9 FL (ref 8.9–12.7)
POTASSIUM SERPL-SCNC: 4.8 MMOL/L (ref 3.5–5.3)
PROT SERPL-MCNC: 6.8 G/DL (ref 6.4–8.4)
RBC # BLD AUTO: 4.5 MILLION/UL (ref 3.88–5.62)
SODIUM SERPL-SCNC: 142 MMOL/L (ref 135–147)
WBC # BLD AUTO: 6.63 THOUSAND/UL (ref 4.31–10.16)

## 2024-05-28 PROCEDURE — 85025 COMPLETE CBC W/AUTO DIFF WBC: CPT

## 2024-05-28 PROCEDURE — 36415 COLL VENOUS BLD VENIPUNCTURE: CPT

## 2024-05-28 PROCEDURE — 80053 COMPREHEN METABOLIC PANEL: CPT

## 2024-06-06 ENCOUNTER — APPOINTMENT (OUTPATIENT)
Dept: LAB | Facility: CLINIC | Age: 54
End: 2024-06-06
Payer: COMMERCIAL

## 2024-06-06 DIAGNOSIS — R74.8 ELEVATED LIVER ENZYMES: ICD-10-CM

## 2024-06-06 LAB
ALBUMIN SERPL BCP-MCNC: 4.2 G/DL (ref 3.5–5)
ALP SERPL-CCNC: 60 U/L (ref 34–104)
ALT SERPL W P-5'-P-CCNC: 46 U/L (ref 7–52)
AST SERPL W P-5'-P-CCNC: 41 U/L (ref 13–39)
BILIRUB DIRECT SERPL-MCNC: 0.06 MG/DL (ref 0–0.2)
BILIRUB SERPL-MCNC: 0.38 MG/DL (ref 0.2–1)
PROT SERPL-MCNC: 6.3 G/DL (ref 6.4–8.4)

## 2024-06-06 PROCEDURE — 80076 HEPATIC FUNCTION PANEL: CPT

## 2024-06-06 PROCEDURE — 36415 COLL VENOUS BLD VENIPUNCTURE: CPT

## 2024-06-15 DIAGNOSIS — Z00.6 ENCOUNTER FOR EXAMINATION FOR NORMAL COMPARISON OR CONTROL IN CLINICAL RESEARCH PROGRAM: ICD-10-CM

## 2024-06-19 ENCOUNTER — APPOINTMENT (OUTPATIENT)
Dept: LAB | Facility: CLINIC | Age: 54
End: 2024-06-19

## 2024-06-19 DIAGNOSIS — Z00.6 ENCOUNTER FOR EXAMINATION FOR NORMAL COMPARISON OR CONTROL IN CLINICAL RESEARCH PROGRAM: ICD-10-CM

## 2024-06-19 PROCEDURE — 36415 COLL VENOUS BLD VENIPUNCTURE: CPT

## 2024-06-20 ENCOUNTER — APPOINTMENT (OUTPATIENT)
Dept: URGENT CARE | Facility: CLINIC | Age: 54
End: 2024-06-20

## 2024-07-03 LAB
APOB+LDLR+PCSK9 GENE MUT ANL BLD/T: NOT DETECTED
BRCA1+BRCA2 DEL+DUP + FULL MUT ANL BLD/T: NOT DETECTED
MLH1+MSH2+MSH6+PMS2 GN DEL+DUP+FUL M: NOT DETECTED

## 2024-09-20 PROCEDURE — 87205 SMEAR GRAM STAIN: CPT | Performed by: PHYSICIAN ASSISTANT

## 2024-09-20 PROCEDURE — 87070 CULTURE OTHR SPECIMN AEROBIC: CPT | Performed by: PHYSICIAN ASSISTANT

## 2024-11-19 ENCOUNTER — APPOINTMENT (OUTPATIENT)
Dept: URGENT CARE | Facility: CLINIC | Age: 54
End: 2024-11-19

## 2025-03-06 ENCOUNTER — APPOINTMENT (OUTPATIENT)
Dept: LAB | Facility: CLINIC | Age: 55
End: 2025-03-06
Payer: COMMERCIAL

## 2025-03-06 ENCOUNTER — TRANSCRIBE ORDERS (OUTPATIENT)
Dept: ADMINISTRATIVE | Facility: HOSPITAL | Age: 55
End: 2025-03-06

## 2025-03-06 DIAGNOSIS — Z13.9 SCREENING FOR UNSPECIFIED CONDITION: ICD-10-CM

## 2025-03-06 DIAGNOSIS — Z00.00 ROUTINE GENERAL MEDICAL EXAMINATION AT A HEALTH CARE FACILITY: ICD-10-CM

## 2025-03-06 DIAGNOSIS — R53.83 FATIGUE, UNSPECIFIED TYPE: ICD-10-CM

## 2025-03-06 DIAGNOSIS — R73.9 BLOOD GLUCOSE ELEVATED: ICD-10-CM

## 2025-03-06 DIAGNOSIS — Z13.220 SCREENING FOR LIPOID DISORDERS: ICD-10-CM

## 2025-03-06 DIAGNOSIS — Z00.00 ROUTINE GENERAL MEDICAL EXAMINATION AT A HEALTH CARE FACILITY: Primary | ICD-10-CM

## 2025-03-06 LAB
ALBUMIN SERPL BCG-MCNC: 4.8 G/DL (ref 3.5–5)
ALP SERPL-CCNC: 63 U/L (ref 34–104)
ALT SERPL W P-5'-P-CCNC: 58 U/L (ref 7–52)
ANION GAP SERPL CALCULATED.3IONS-SCNC: 9 MMOL/L (ref 4–13)
AST SERPL W P-5'-P-CCNC: 54 U/L (ref 13–39)
BASOPHILS # BLD AUTO: 0.09 THOUSANDS/ÂΜL (ref 0–0.1)
BASOPHILS NFR BLD AUTO: 1 % (ref 0–1)
BILIRUB SERPL-MCNC: 0.61 MG/DL (ref 0.2–1)
BILIRUB UR QL STRIP: NEGATIVE
BUN SERPL-MCNC: 15 MG/DL (ref 5–25)
CALCIUM SERPL-MCNC: 9.7 MG/DL (ref 8.4–10.2)
CHLORIDE SERPL-SCNC: 94 MMOL/L (ref 96–108)
CHOLEST SERPL-MCNC: 230 MG/DL (ref ?–200)
CLARITY UR: CLEAR
CO2 SERPL-SCNC: 32 MMOL/L (ref 21–32)
COLOR UR: NORMAL
CREAT SERPL-MCNC: 0.74 MG/DL (ref 0.6–1.3)
EOSINOPHIL # BLD AUTO: 0.09 THOUSAND/ÂΜL (ref 0–0.61)
EOSINOPHIL NFR BLD AUTO: 1 % (ref 0–6)
ERYTHROCYTE [DISTWIDTH] IN BLOOD BY AUTOMATED COUNT: 13.2 % (ref 11.6–15.1)
EST. AVERAGE GLUCOSE BLD GHB EST-MCNC: 146 MG/DL
GFR SERPL CREATININE-BSD FRML MDRD: 106 ML/MIN/1.73SQ M
GLUCOSE P FAST SERPL-MCNC: 102 MG/DL (ref 65–99)
GLUCOSE UR STRIP-MCNC: NEGATIVE MG/DL
HBA1C MFR BLD: 6.7 %
HCT VFR BLD AUTO: 42.4 % (ref 36.5–49.3)
HDLC SERPL-MCNC: 73 MG/DL
HGB BLD-MCNC: 13.9 G/DL (ref 12–17)
HGB UR QL STRIP.AUTO: NEGATIVE
IMM GRANULOCYTES # BLD AUTO: 0.04 THOUSAND/UL (ref 0–0.2)
IMM GRANULOCYTES NFR BLD AUTO: 0 % (ref 0–2)
KETONES UR STRIP-MCNC: NEGATIVE MG/DL
LDLC SERPL CALC-MCNC: 133 MG/DL (ref 0–100)
LEUKOCYTE ESTERASE UR QL STRIP: NEGATIVE
LYMPHOCYTES # BLD AUTO: 2.48 THOUSANDS/ÂΜL (ref 0.6–4.47)
LYMPHOCYTES NFR BLD AUTO: 22 % (ref 14–44)
MCH RBC QN AUTO: 28.7 PG (ref 26.8–34.3)
MCHC RBC AUTO-ENTMCNC: 32.8 G/DL (ref 31.4–37.4)
MCV RBC AUTO: 87 FL (ref 82–98)
MONOCYTES # BLD AUTO: 1 THOUSAND/ÂΜL (ref 0.17–1.22)
MONOCYTES NFR BLD AUTO: 9 % (ref 4–12)
NEUTROPHILS # BLD AUTO: 7.41 THOUSANDS/ÂΜL (ref 1.85–7.62)
NEUTS SEG NFR BLD AUTO: 67 % (ref 43–75)
NITRITE UR QL STRIP: NEGATIVE
NONHDLC SERPL-MCNC: 157 MG/DL
NRBC BLD AUTO-RTO: 0 /100 WBCS
PH UR STRIP.AUTO: 6.5 [PH]
PLATELET # BLD AUTO: 514 THOUSANDS/UL (ref 149–390)
PMV BLD AUTO: 9.9 FL (ref 8.9–12.7)
POTASSIUM SERPL-SCNC: 4.2 MMOL/L (ref 3.5–5.3)
PROT SERPL-MCNC: 7.7 G/DL (ref 6.4–8.4)
PROT UR STRIP-MCNC: NEGATIVE MG/DL
RBC # BLD AUTO: 4.85 MILLION/UL (ref 3.88–5.62)
SODIUM SERPL-SCNC: 135 MMOL/L (ref 135–147)
SP GR UR STRIP.AUTO: 1.02 (ref 1–1.03)
T4 FREE SERPL-MCNC: 0.71 NG/DL (ref 0.61–1.12)
TRIGL SERPL-MCNC: 119 MG/DL (ref ?–150)
TSH SERPL DL<=0.05 MIU/L-ACNC: 4.17 UIU/ML (ref 0.45–4.5)
UROBILINOGEN UR STRIP-ACNC: <2 MG/DL
WBC # BLD AUTO: 11.11 THOUSAND/UL (ref 4.31–10.16)

## 2025-03-06 PROCEDURE — 84439 ASSAY OF FREE THYROXINE: CPT | Performed by: INTERNAL MEDICINE

## 2025-03-06 PROCEDURE — 83036 HEMOGLOBIN GLYCOSYLATED A1C: CPT

## 2025-03-06 PROCEDURE — 85025 COMPLETE CBC W/AUTO DIFF WBC: CPT

## 2025-03-06 PROCEDURE — 84443 ASSAY THYROID STIM HORMONE: CPT

## 2025-03-06 PROCEDURE — 80061 LIPID PANEL: CPT

## 2025-03-06 PROCEDURE — 80053 COMPREHEN METABOLIC PANEL: CPT

## 2025-03-06 PROCEDURE — 81003 URINALYSIS AUTO W/O SCOPE: CPT

## 2025-03-06 PROCEDURE — 36415 COLL VENOUS BLD VENIPUNCTURE: CPT

## 2025-03-10 ENCOUNTER — HOSPITAL ENCOUNTER (OUTPATIENT)
Dept: RADIOLOGY | Facility: HOSPITAL | Age: 55
Discharge: HOME | End: 2025-03-10
Payer: COMMERCIAL

## 2025-03-10 DIAGNOSIS — M79.622 PAIN IN LEFT AXILLA: ICD-10-CM

## 2025-03-10 PROCEDURE — 73630 X-RAY EXAM OF FOOT: CPT

## 2025-04-14 ENCOUNTER — HOSPITAL ENCOUNTER (OUTPATIENT)
Dept: MRI IMAGING | Facility: HOSPITAL | Age: 55
Discharge: HOME | End: 2025-04-14
Payer: COMMERCIAL

## 2025-04-14 DIAGNOSIS — M76.72 PERONEAL TENDINITIS, LEFT LEG: ICD-10-CM

## 2025-04-14 DIAGNOSIS — M79.672 PAIN IN LEFT FOOT: ICD-10-CM

## 2025-04-14 PROCEDURE — 73718 MRI LOWER EXTREMITY W/O DYE: CPT

## 2025-04-28 ENCOUNTER — APPOINTMENT (OUTPATIENT)
Dept: URGENT CARE | Facility: CLINIC | Age: 55
End: 2025-04-28

## 2025-07-08 ENCOUNTER — HOSPITAL ENCOUNTER (OUTPATIENT)
Dept: ULTRASOUND IMAGING | Facility: HOSPITAL | Age: 55
Discharge: HOME | End: 2025-07-08
Attending: PHYSICIAN ASSISTANT
Payer: COMMERCIAL

## 2025-07-08 DIAGNOSIS — E66.9 OBESITY, UNSPECIFIED CLASS, UNSPECIFIED OBESITY TYPE, UNSPECIFIED WHETHER SERIOUS COMORBIDITY PRESENT: ICD-10-CM

## 2025-07-08 DIAGNOSIS — K76.0 FATTY LIVER: ICD-10-CM

## 2025-07-08 DIAGNOSIS — R94.5 ABNORMAL RESULTS OF LIVER FUNCTION STUDIES: ICD-10-CM

## 2025-07-08 DIAGNOSIS — K21.9 GASTROESOPHAGEAL REFLUX DISEASE WITHOUT ESOPHAGITIS: ICD-10-CM

## 2025-07-08 PROCEDURE — 76981 USE PARENCHYMA: CPT

## 2025-07-08 PROCEDURE — 76700 US EXAM ABDOM COMPLETE: CPT

## 2025-08-11 ENCOUNTER — APPOINTMENT (EMERGENCY)
Dept: RADIOLOGY | Facility: HOSPITAL | Age: 55
End: 2025-08-11
Payer: COMMERCIAL

## 2025-08-11 ENCOUNTER — HOSPITAL ENCOUNTER (EMERGENCY)
Facility: HOSPITAL | Age: 55
Discharge: HOME | End: 2025-08-11
Payer: COMMERCIAL